# Patient Record
Sex: FEMALE | Race: WHITE | NOT HISPANIC OR LATINO | Employment: FULL TIME | URBAN - METROPOLITAN AREA
[De-identification: names, ages, dates, MRNs, and addresses within clinical notes are randomized per-mention and may not be internally consistent; named-entity substitution may affect disease eponyms.]

---

## 2017-01-11 ENCOUNTER — TRANSFERRED RECORDS (OUTPATIENT)
Dept: HEALTH INFORMATION MANAGEMENT | Facility: CLINIC | Age: 37
End: 2017-01-11

## 2017-01-28 ENCOUNTER — TRANSFERRED RECORDS (OUTPATIENT)
Dept: HEALTH INFORMATION MANAGEMENT | Facility: CLINIC | Age: 37
End: 2017-01-28

## 2017-03-27 DIAGNOSIS — G47.9 DISTURBANCE IN SLEEP BEHAVIOR: ICD-10-CM

## 2017-03-27 NOTE — TELEPHONE ENCOUNTER
trazodone       Last Written Prescription Date: 07/08/16  Last Fill Quantity: 90; # refills: 0  Last Office Visit with [facility], [facility] or  Health prescribing provider:  12/09/16        Last PHQ-9 score on record=   PHQ-9 SCORE 12/9/2016   Total Score -   Total Score 7       Lab Results   Component Value Date    AST 28 06/17/2009     Lab Results   Component Value Date    ALT 11 06/17/2009

## 2017-03-29 RX ORDER — TRAZODONE HYDROCHLORIDE 50 MG/1
TABLET, FILM COATED ORAL
Qty: 90 TABLET | Refills: 0 | Status: SHIPPED | OUTPATIENT
Start: 2017-03-29 | End: 2019-09-27

## 2017-03-29 NOTE — TELEPHONE ENCOUNTER
Prescription approved per [facility] Refill Protocol.  Geraldine Ruiz RN

## 2017-05-08 ENCOUNTER — OFFICE VISIT (OUTPATIENT)
Dept: FAMILY MEDICINE | Facility: CLINIC | Age: 37
End: 2017-05-08
Payer: COMMERCIAL

## 2017-05-08 VITALS
BODY MASS INDEX: 23.46 KG/M2 | HEART RATE: 72 BPM | DIASTOLIC BLOOD PRESSURE: 70 MMHG | TEMPERATURE: 98.1 F | WEIGHT: 127.5 LBS | HEIGHT: 62 IN | RESPIRATION RATE: 8 BRPM | SYSTOLIC BLOOD PRESSURE: 100 MMHG

## 2017-05-08 DIAGNOSIS — S61.451A ANIMAL BITE OF HAND, RIGHT, INITIAL ENCOUNTER: Primary | ICD-10-CM

## 2017-05-08 PROCEDURE — 99214 OFFICE O/P EST MOD 30 MIN: CPT | Performed by: PHYSICIAN ASSISTANT

## 2017-05-08 RX ORDER — DOXYCYCLINE 100 MG/1
100 CAPSULE ORAL 2 TIMES DAILY
Qty: 20 CAPSULE | Refills: 0 | Status: SHIPPED | OUTPATIENT
Start: 2017-05-08 | End: 2017-10-31

## 2017-05-08 ASSESSMENT — ANXIETY QUESTIONNAIRES
2. NOT BEING ABLE TO STOP OR CONTROL WORRYING: SEVERAL DAYS
6. BECOMING EASILY ANNOYED OR IRRITABLE: MORE THAN HALF THE DAYS
GAD7 TOTAL SCORE: 8
3. WORRYING TOO MUCH ABOUT DIFFERENT THINGS: SEVERAL DAYS
7. FEELING AFRAID AS IF SOMETHING AWFUL MIGHT HAPPEN: SEVERAL DAYS
1. FEELING NERVOUS, ANXIOUS, OR ON EDGE: SEVERAL DAYS
IF YOU CHECKED OFF ANY PROBLEMS ON THIS QUESTIONNAIRE, HOW DIFFICULT HAVE THESE PROBLEMS MADE IT FOR YOU TO DO YOUR WORK, TAKE CARE OF THINGS AT HOME, OR GET ALONG WITH OTHER PEOPLE: NOT DIFFICULT AT ALL
5. BEING SO RESTLESS THAT IT IS HARD TO SIT STILL: SEVERAL DAYS

## 2017-05-08 ASSESSMENT — PATIENT HEALTH QUESTIONNAIRE - PHQ9: 5. POOR APPETITE OR OVEREATING: SEVERAL DAYS

## 2017-05-08 ASSESSMENT — PAIN SCALES - GENERAL: PAINLEVEL: MODERATE PAIN (4)

## 2017-05-08 NOTE — PROGRESS NOTES
SUBJECTIVE:                                                    Barbara Yates is a 36 year old female who presents to clinic today for the following health issues:      Concern - Parrot Bite      Onset: Yesterday- 11am (28hrs)    They own 2 parrots. Moved some of the equipment in cage and got bit by her African Gibbs parrot.     Right hand- top of beak into the palmar side of hand, between thumb and index finger. Only 1 puncture site.    RIGHT handed    Pain and swelling, pain with gripping.     No drainage from site. At time of bite small bleeding.     Slightly red over dorsum of hand    No fevers or chills.    Does not fell sick.     No headaches    Description:   Was bitten my her parrot. He then bit her hand. Hand is swollen and red. Pt stated the bite is deep    Intensity: 4/10    Progression of Symptoms:  worsening from movements and hand shakes at work    Accompanying Signs & Symptoms:  No       Previous history of similar problem:   No    Precipitating factors:   Worsened by: overuse    Alleviating factors:  Improved by: No       Therapies Tried and outcome: No      Problem list and histories reviewed & adjusted, as indicated.  Additional history: as documented    Patient Active Problem List   Diagnosis     Disturbance in sleep behavior     Urinary frequency     Papanicolaou smear of cervix with low grade squamous intraepithelial lesion (LGSIL)     Other abnormal Papanicolaou smear of cervix and cervical HPV(795.09)     CARDIOVASCULAR SCREENING; LDL GOAL LESS THAN 160     Generalized anxiety disorder     Moderate major depression (H)     Anal fissure     Acquired subluxation of left patella, subsequent encounter     Past Surgical History:   Procedure Laterality Date     ARTHROSCOPY KNEE RT/LT Left 9/8/16     AS CLOSED TX NASAL BONE FRACTURE W/ STABILIZATION  3/1/16     EXAM OF VAGINA,COLPOSCOPY  2006, 2005     EYE SURGERY  2008    Lasik eye surgery       Social History   Substance Use Topics      "Smoking status: Former Smoker     Years: 15.00     Types: Cigarettes     Quit date: 7/23/2010     Smokeless tobacco: Never Used     Alcohol use Yes      Comment: rarely     Family History   Problem Relation Age of Onset     Hypertension Mother      Unknown/Adopted Father      Family History Negative Other          Current Outpatient Prescriptions   Medication Sig Dispense Refill     traZODone (DESYREL) 50 MG tablet TAKE ONE-HALF (1/2) TABLET NIGHTLY AS NEEDED FOR SLEEP 90 tablet 0     busPIRone (BUSPAR) 15 MG tablet TAKE 1 TABLET TWICE A  tablet 1     FLUoxetine (PROZAC) 40 MG capsule TAKE 2 CAPSULES DAILY 180 capsule 2     albuterol (PROAIR HFA, PROVENTIL HFA, VENTOLIN HFA) 108 (90 BASE) MCG/ACT inhaler Inhale 2 puffs into the lungs every 6 hours 2 Inhaler 0     DimenhyDRINATE (DRAMAMINE PO) Take  by mouth. As needed for dizziness       Calcium Carbonate-Vitamin D (CALCIUM + D PO) Take  by mouth.       desonide (DESOWEN) 0.05 % cream Reported on 5/8/2017       cetirizine HCl (ZYRTEC) 10 MG CHEW Take by mouth daily Reported on 5/8/2017       Ferrous Sulfate (SLOW FE PO) Reported on 5/8/2017       Prenatal Vit-Fe Sulfate-FA (PRENATAL MULTIVIT-IRON PO) Reported on 5/8/2017       Allergies   Allergen Reactions     Seasonal Allergies      BP Readings from Last 3 Encounters:   05/08/17 100/70   12/09/16 100/60   09/01/16 110/68    Wt Readings from Last 3 Encounters:   05/08/17 127 lb 8 oz (57.8 kg)   12/09/16 129 lb (58.5 kg)   09/01/16 137 lb (62.1 kg)                Labs reviewed in EPIC    Reviewed and updated as needed this visit by clinical staff       Reviewed and updated as needed this visit by Provider         ROS:  Constitutional, HEENT, cardiovascular, pulmonary, gi and gu systems are negative, except as otherwise noted.    OBJECTIVE:                                                    /70  Pulse 72  Temp 98.1  F (36.7  C) (Temporal)  Resp 8  Ht 5' 2.4\" (1.585 m)  Wt 127 lb 8 oz (57.8 kg)  LMP " 04/07/2017  BMI 23.02 kg/m2  Body mass index is 23.02 kg/(m^2).  GENERAL: healthy, alert and no distress  MS: right hand: +swelling and subtle redness between right thumb and index finger. Puncture site palmar side. Light pink over dorsum of hand between thumb and index with subtle swelling up to index MCP. Tender with palpation. ROM normal, strength 5/5 with flex, extension, pronation, supination.  5/5, sensation normal and symmetric, radial pulses normal    Diagnostic Test Results:  none      ASSESSMENT/PLAN:                                                      1. Animal bite of hand, right, initial encounter- parrot bite  Reviewed Up to date for recommendations on parrot bite:  Coverage for most common pet-related leighton bacterial zoonosis Chlamydia psittaci - with doxycycline. Doxy would also cover for P.multocida and staph.   Marked line with permanent gunn over cellulitic area. Warning s/sx for Er eval if worsening pain, swelling, lymphatic streaking discussed.   Close follow up- recheck 48hrs.   - doxycycline (VIBRAMYCIN) 100 MG capsule; Take 1 capsule (100 mg) by mouth 2 times daily  Dispense: 20 capsule; Refill: 0    Follow Up: For worsening symptoms (ie new fevers, worsening pain, etc), non-improvement as expected/discussed, questions regarding your medications or treatment plan. Discussed parameters for follow up and included in After Visit Summary given to patient.      Tamiko Santos PA-C  [facility]

## 2017-05-08 NOTE — PATIENT INSTRUCTIONS
The most common pet-related leighton bacterial zoonosis is infection due to Chlamydia psittaci (psittacosis).Doxycycline (100 mg orally twice daily) usually produces a rapid clinical response in patients with mild to moderate disease.   Doxy also covers for P.multocida .    Doxycycline : Twice daily x 10 days  Separate out calcium supplements  Take with food and water  May make you sunsensitive    Signs and symptoms to be seen urgently (ER):  New fever and feeling ill (flu like)   Pus from the wound  Red streak up the arm  Expanding redness outside line drawn  Swelling of the hand, ie harder to close fingers, etc.           Cellulitis  Cellulitis is an infection of the deep layers of skin. A break in the skin, such as a cut or scratch, can let bacteria under the skin. If the bacteria get to deep layers of the skin, it can be serious. If not treated, cellulitis can get into the bloodstream and lymph nodes. The infection can then spread throughout the body. This causes serious illness.  Cellulitis causes the affected skin to become red, swollen, warm, and sore. The reddened areas have a visible border. An open sore may leak fluid (pus). You may have a fever, chills, and pain.  Cellulitis is treated with antibiotics taken for 7 to 10 days. An open sore may be cleaned and covered with cool wet gauze. Symptoms should get better 1 to 2 days after treatment is started. Make sure to take all the antibiotics for the full number of days until they are gone. Keep taking the medicine even if your symptoms go away.  Home care  Follow these tips:    Limit the use of the part of your body with cellulitis. Movement can cause the infection to spread.    If the infection is on your leg, walk as little as possible in the first few days of the treatment. Keep your leg raised while sitting. This will help to reduce swelling.    Take all of the antibiotic medicine exactly as directed until it is gone. Do not miss any doses, especially  during the first 7 days. Don t stop taking the medicine when your symptoms get better.    Keep the affected area clean and dry.    Wash your hands with soap and warm water before and after touching your skin. Anyone else who touches your skin should also wash his or her hands. Don't share towels.  Follow-up care  Follow up with your healthcare provider. If your infection does not go away on 1 antibiotic, your healthcare provider will prescribe a different one.  When to seek medical advice  Call your healthcare provider right away if any of these occur:    Red areas that spread    Swelling or pain that gets worse    Fluid leaking from the skin (pus)    Fever higher of 100.4  F (38.0  C) or higher after 2 days on antibiotics    8931-5900 [employer]. [address] [place] [address], [place] [address]. All rights reserved. This information is not intended as a substitute for professional medical care. Always follow your healthcare professional's instructions.

## 2017-05-08 NOTE — MR AVS SNAPSHOT
After Visit Summary   5/8/2017    Barbara Yates    MRN: [id number]           Patient Information     Date Of Birth          1980        Visit Information        Provider Department      5/8/2017 3:40 PM Tamiko Santos PA-C [facility]        Today's Diagnoses     Animal bite of hand, right, initial encounter- parrot bite    -  1      Care Instructions    The most common pet-related leighton bacterial zoonosis is infection due to Chlamydia psittaci (psittacosis).Doxycycline (100 mg orally twice daily) usually produces a rapid clinical response in patients with mild to moderate disease.   Doxy also covers for P.multocida .    Doxycycline : Twice daily x 10 days  Separate out calcium supplements  Take with food and water  May make you sunsensitive    Signs and symptoms to be seen urgently (ER):  New fever and feeling ill (flu like)   Pus from the wound  Red streak up the arm  Expanding redness outside line drawn  Swelling of the hand, ie harder to close fingers, etc.           Cellulitis  Cellulitis is an infection of the deep layers of skin. A break in the skin, such as a cut or scratch, can let bacteria under the skin. If the bacteria get to deep layers of the skin, it can be serious. If not treated, cellulitis can get into the bloodstream and lymph nodes. The infection can then spread throughout the body. This causes serious illness.  Cellulitis causes the affected skin to become red, swollen, warm, and sore. The reddened areas have a visible border. An open sore may leak fluid (pus). You may have a fever, chills, and pain.  Cellulitis is treated with antibiotics taken for 7 to 10 days. An open sore may be cleaned and covered with cool wet gauze. Symptoms should get better 1 to 2 days after treatment is started. Make sure to take all the antibiotics for the full number of days until they are gone. Keep taking the medicine even if your symptoms go away.  Home care  Follow these  tips:    Limit the use of the part of your body with cellulitis. Movement can cause the infection to spread.    If the infection is on your leg, walk as little as possible in the first few days of the treatment. Keep your leg raised while sitting. This will help to reduce swelling.    Take all of the antibiotic medicine exactly as directed until it is gone. Do not miss any doses, especially during the first 7 days. Don t stop taking the medicine when your symptoms get better.    Keep the affected area clean and dry.    Wash your hands with soap and warm water before and after touching your skin. Anyone else who touches your skin should also wash his or her hands. Don't share towels.  Follow-up care  Follow up with your healthcare provider. If your infection does not go away on 1 antibiotic, your healthcare provider will prescribe a different one.  When to seek medical advice  Call your healthcare provider right away if any of these occur:    Red areas that spread    Swelling or pain that gets worse    Fluid leaking from the skin (pus)    Fever higher of 100.4  F (38.0  C) or higher after 2 days on antibiotics    6479-7575 [employer]. [address] [place] [address]. All rights reserved. This information is not intended as a substitute for professional medical care. Always follow your healthcare professional's instructions.              Follow-ups after your visit        Who to contact     If you have questions or need follow up information about today's clinic visit or your schedule please contact [facility] directly at [phone].  Normal or non-critical lab and imaging results will be communicated to you by MyChart, letter or phone within 4 business days after the clinic has received the results. If you do not hear from us within 7 days, please contact the clinic through MyChart or phone. If you have a critical or abnormal lab result, we will notify you by phone as soon as  "possible.  Submit refill requests through [employer] or call your pharmacy and they will forward the refill request to us. Please allow 3 business days for your refill to be completed.          Additional Information About Your Visit        [email or web address] Information     [employer] gives you secure access to your electronic health record. If you see a primary care provider, you can also send messages to your care team and make appointments. If you have questions, please call your primary care clinic.  If you do not have a primary care provider, please call [phone] and they will assist you.        Care EveryWhere ID     This is your Care EveryWhere ID. This could be used by other organizations to access your [facility] medical records  [id number]        Your Vitals Were     Pulse Temperature Respirations Height Last Period BMI (Body Mass Index)    72 98.1  F (36.7  C) (Temporal) 8 5' 2.4\" (1.585 m) 04/07/2017 23.02 kg/m2       Blood Pressure from Last 3 Encounters:   05/08/17 100/70   12/09/16 100/60   09/01/16 110/68    Weight from Last 3 Encounters:   05/08/17 127 lb 8 oz (57.8 kg)   12/09/16 129 lb (58.5 kg)   09/01/16 137 lb (62.1 kg)              Today, you had the following     No orders found for display       Primary Care Provider Office Phone # Fax #    Nicole MANJULA Bernardo [facility] [phone]       [facility] [address] [facility] [address]        Thank you!     Thank you for choosing [facility]  for your care. Our goal is always to provide you with excellent care. Hearing back from our patients is one way we can continue to improve our services. Please take a few minutes to complete the written survey that you may receive in the mail after your visit with us. Thank you!             Your Updated Medication List - Protect others around you: Learn how to safely use, store and throw away your medicines at www.disposemymeds.org.          This list is accurate as of: 5/8/17  4:12 " PM.  Always use your most recent med list.                   Brand Name Dispense Instructions for use    albuterol 108 (90 BASE) MCG/ACT Inhaler    PROAIR HFA/PROVENTIL HFA/VENTOLIN HFA    2 Inhaler    Inhale 2 puffs into the lungs every 6 hours       busPIRone 15 MG tablet    BUSPAR    180 tablet    TAKE 1 TABLET TWICE A DAY       CALCIUM + D PO      Take  by mouth.       desonide 0.05 % cream    DESOWEN     Reported on 5/8/2017       DRAMAMINE PO      Take  by mouth. As needed for dizziness       FLUoxetine 40 MG capsule    PROzac    180 capsule    TAKE 2 CAPSULES DAILY       PRENATAL MULTIVIT-IRON PO      Reported on 5/8/2017       SLOW FE PO      Reported on 5/8/2017       traZODone 50 MG tablet    DESYREL    90 tablet    TAKE ONE-HALF (1/2) TABLET NIGHTLY AS NEEDED FOR SLEEP       zyrTEC 10 MG Chew   Generic drug:  cetirizine      Take by mouth daily Reported on 5/8/2017

## 2017-05-08 NOTE — NURSING NOTE
"Chief Complaint   Patient presents with     animal bite     x Yesterday. Swollen, painful. Deep bite     Panel Management     PHQ-9/NELL       Initial /70  Pulse 72  Temp 98.1  F (36.7  C) (Temporal)  Resp 8  Ht 5' 2.4\" (1.585 m)  Wt 127 lb 8 oz (57.8 kg)  LMP 04/07/2017  BMI 23.02 kg/m2 Estimated body mass index is 23.02 kg/(m^2) as calculated from the following:    Height as of this encounter: 5' 2.4\" (1.585 m).    Weight as of this encounter: 127 lb 8 oz (57.8 kg).  Medication Reconciliation: complete     Jacy Driscoll CMA  "

## 2017-05-09 ENCOUNTER — TRANSFERRED RECORDS (OUTPATIENT)
Dept: HEALTH INFORMATION MANAGEMENT | Facility: CLINIC | Age: 37
End: 2017-05-09

## 2017-05-09 ASSESSMENT — PATIENT HEALTH QUESTIONNAIRE - PHQ9: SUM OF ALL RESPONSES TO PHQ QUESTIONS 1-9: 6

## 2017-05-09 ASSESSMENT — ANXIETY QUESTIONNAIRES: GAD7 TOTAL SCORE: 8

## 2017-05-21 ENCOUNTER — TRANSFERRED RECORDS (OUTPATIENT)
Dept: HEALTH INFORMATION MANAGEMENT | Facility: CLINIC | Age: 37
End: 2017-05-21

## 2017-07-02 DIAGNOSIS — F41.1 GENERALIZED ANXIETY DISORDER: ICD-10-CM

## 2017-07-03 NOTE — TELEPHONE ENCOUNTER
busPIRone (BUSPAR) 15 MG tablet       Last Written Prescription Date: 1/3/17  Last Fill Quantity: 180; # refills: 1  Last Office Visit with [facility], [facility] or [facility] prescribing provider:  5/8/17        Last PHQ-9 score on record=   PHQ-9 SCORE 5/8/2017   Total Score -   Total Score 6       Lab Results   Component Value Date    AST 28 06/17/2009     Lab Results   Component Value Date    ALT 11 06/17/2009

## 2017-07-05 RX ORDER — BUSPIRONE HYDROCHLORIDE 15 MG/1
TABLET ORAL
Qty: 180 TABLET | Refills: 1 | Status: SHIPPED | OUTPATIENT
Start: 2017-07-05 | End: 2019-09-27

## 2017-07-05 NOTE — TELEPHONE ENCOUNTER
Prescription approved per [facility] Refill Protocol.  Abdiel Taylor, RN, BSN

## 2017-07-25 LAB — PHQ9 SCORE: 12

## 2017-08-24 ENCOUNTER — TRANSFERRED RECORDS (OUTPATIENT)
Dept: HEALTH INFORMATION MANAGEMENT | Facility: CLINIC | Age: 37
End: 2017-08-24

## 2017-10-02 ENCOUNTER — TRANSFERRED RECORDS (OUTPATIENT)
Dept: HEALTH INFORMATION MANAGEMENT | Facility: CLINIC | Age: 37
End: 2017-10-02

## 2017-10-05 ENCOUNTER — TRANSFERRED RECORDS (OUTPATIENT)
Dept: HEALTH INFORMATION MANAGEMENT | Facility: CLINIC | Age: 37
End: 2017-10-05

## 2017-10-26 NOTE — PROGRESS NOTES
SUBJECTIVE:   CC: Barbara Yates is an 37 year old woman who presents for preventive health visit.     Physical   Annual:     Getting at least 3 servings of Calcium per day::  Yes    Bi-annual eye exam::  Yes    Dental care twice a year::  Yes    Sleep apnea or symptoms of sleep apnea::  None    Diet::  Regular (no restrictions)    Frequency of exercise::  1 day/week    Duration of exercise::  Less than 15 minutes    Taking medications regularly::  Yes    Medication side effects::  None    Additional concerns today::  YES    Left Knee: the patient says she fell after her surgery and is seeing orthopedist for this issue. She was wearing a knee brace when she fell so it wasn't a huge problem.     Mood: the patient reports good control of her depression at this time. She reports no problems with her medications.     Skin: The pt says a dog recently bit her but the issue has resolved.     Respiratory: the pt is using Albuterol as needed for breathing problems. She is requesting a refill for this inhaler.    : the pt is not using birth control at this time and notes her LMP was in August. She has irregular menstrual cycles.     Nutrition/Supplements: She notes her nutrition hasn't been great, she has a decreased appetite however. She notes she is eating but not as much. The patient takes a multivitamin and does a meal replacement shake in the mornings.     Libido/Sex: the pt explains a decreased sexual drive. She notes discomfort during intercourse, despite using lubrication.     Labs: the patient is fasting for labs today.    Today's PHQ-2 Score:   PHQ-2 ( 1999 Pfizer) 10/31/2017   Q1: Little interest or pleasure in doing things 1   Q2: Feeling down, depressed or hopeless 0   PHQ-2 Score 1   Q1: Little interest or pleasure in doing things Several days   Q2: Feeling down, depressed or hopeless Not at all   PHQ-2 Score 1     Abuse: Current or Past(Physical, Sexual or Emotional)- No  Do you feel safe in your  environment - Yes    Social History   Substance Use Topics     Smoking status: Former Smoker     Years: 15.00     Types: Cigarettes     Quit date: 7/23/2010     Smokeless tobacco: Never Used     Alcohol use Yes      Comment: rarely     The patient does not drink >3 drinks per day nor >7 drinks per week.    Reviewed orders with patient.  Reviewed health maintenance and updated orders accordingly - Yes  Labs reviewed in EPIC    Mammogram not appropriate for this patient based on age.    Pertinent mammograms are reviewed under the imaging tab.    History of abnormal Pap smear: YES - other categories - see link Cervical Cytology Screening Guidelines  Abnormal in 2005 (LSIL on 05/06/2005 and ASCUS on 11/28/2005, has since resolved  Health maintenance: Pap Q5 years with co-negative HPV testing      Reviewed and updated as needed this visit by clinical staff  Tobacco  Allergies  Meds  Problems  Med Hx  Surg Hx  Fam Hx  Soc Hx        Reviewed and updated as needed this visit by Provider  Allergies  Meds  Problems        Past Medical History:   Diagnosis Date     Anxiety      ASCUS with positive high risk HPV 11/28/05, 6/14/06    + high risk HPV (53)     Attention deficit disorder with hyperactivity(314.01)      Depression      H/O colposcopy with cervical biopsy 6/7/05    ECC- within normal limits. no dysplasia     H/O colposcopy with cervical biopsy 7/13/06    biopsies and ECC- within normal limits. no dysplasia noted.     Infectious mononucleosis 2004     LSIL (low grade squamous intraepithelial lesion) on Pap smear 5/6/05    LDSIL      Past Surgical History:   Procedure Laterality Date     ARTHROSCOPY KNEE RT/LT Left 9/8/16     AS CLOSED TX NASAL BONE FRACTURE W/ STABILIZATION  3/1/16     EXAM OF VAGINA,COLPOSCOPY  2006, 2005     EYE SURGERY  2008    Lasik eye surgery     Review of Systems  C: NEGATIVE for fever, chills, change in weight (See HPI above)  I: NEGATIVE for worrisome rashes, moles or lesions  E:  "NEGATIVE for vision changes or irritation  ENT: NEGATIVE for ear, mouth and throat problems  R: NEGATIVE for significant cough or SOB  B: NEGATIVE for masses, tenderness or discharge  CV: NEGATIVE for chest pain, palpitations or peripheral edema  GI: NEGATIVE for nausea, abdominal pain, heartburn, or change in bowel habits  : NEGATIVE for unusual urinary or vaginal symptoms. Periods are regular.  M: NEGATIVE for significant arthralgias or myalgia  N: NEGATIVE for weakness, dizziness or paresthesias  P: NEGATIVE for changes in mood or affect     This document serves as a record of the services and decisions personally performed and made by Nicole Soto DNP. It was created on her behalf by Hong Ball, a trained medical scribe. The creation of this document is based on the provider's statements to the medical scribe.  Hong Ball 9:02 AM October 31, 2017    OBJECTIVE:   BP 96/60  Pulse 84  Temp 98.8  F (37.1  C) (Temporal)  Resp 20  Ht 5' 2.8\" (1.595 m)  Wt 120 lb (54.4 kg)  LMP 08/03/2017  BMI 21.4 kg/m2     Physical Exam  GENERAL: healthy, alert and no distress  EYES: Eyes grossly normal to inspection, PERRL and conjunctivae and sclerae normal  HENT: ear canals and TM's normal, nose and mouth without ulcers or lesions  NECK: no adenopathy, no asymmetry, masses, or scars and thyroid normal to palpation  RESP: lungs clear to auscultation - no rales, rhonchi or wheezes  BREAST: normal without masses, tenderness or nipple discharge and no palpable axillary masses or adenopathy  CV: regular rate and rhythm, normal S1 S2, no S3 or S4, no murmur, click or rub, no peripheral edema and peripheral pulses strong  ABDOMEN: soft, nontender, no hepatosplenomegaly, no masses and bowel sounds normal   (female): normal female external genitalia, normal urethral meatus, vaginal mucosa pink, moist, well rugated, and normal cervix/adnexa/uterus without masses or discharge  MS: no gross musculoskeletal defects noted, " no edema  SKIN: no suspicious lesions or rashes to visible skin   NEURO: Normal strength and tone, mentation intact and speech normal  PSYCH: mentation appears normal, affect normal/bright    ASSESSMENT/PLAN:       ICD-10-CM    1. Encounter for routine adult health examination with abnormal findings Z00.01 Lipid panel reflex to direct LDL Fasting     Glucose     Vitamin D Deficiency   2. Cough R05 albuterol (PROAIR HFA/PROVENTIL HFA/VENTOLIN HFA) 108 (90 BASE) MCG/ACT Inhaler   3. Iron deficiency anemia secondary to inadequate dietary iron intake D50.8 CBC with platelets and differential     Ferritin     Folate   4. Need for prophylactic vaccination and inoculation against influenza Z23 FLU VAC, SPLIT VIRUS IM > 3 YO (QUADRIVALENT) [25270]     Vaccine Administration, Initial [61812]   5. Need for Tdap vaccination Z23 TDAP VACCINE (ADACEL)     EA ADD'L VACCINE   6. Moderate major depression (H) F32.1    7. Generalized anxiety disorder F41.1      Cough: Albuterol 1-8 mcg/ACT inhaler refilled.     Skin: the pt is offered doxycycline for her skin issue if it is still a problem. She defers at this time.     Lifestyle: advised the patient to continue exercising and try to eat more (not miss meals) despite her decreased appetite. Discussed possible nutritional etiologies of decreased sex drive. Advised switching sexual positions to try to alleviate discomfort.  Informed the pt that even with using condoms, there is a chance of getting pregnant. Pt defers birth control at this time.     Discussed her libido, meds contributing, relationship issues with her  and his possible sex addiction. Continue counseling for this as needed.     Labs: Order placed for labs that the patient will complete today.    Depression and anxiety with counseling and outside psychiatry.     COUNSELING:  Reviewed preventive health counseling, as reflected in patient instructions       Regular exercise       Healthy diet/nutrition       Vision  "screening       Hearing screening       Immunizations    Vaccinated for: Influenza, TDaP      reports that she quit smoking about 7 years ago. Her smoking use included Cigarettes. She quit after 15.00 years of use. She has never used smokeless tobacco.  Estimated body mass index is 21.4 kg/(m^2) as calculated from the following:    Height as of this encounter: 5' 2.8\" (1.595 m).    Weight as of this encounter: 120 lb (54.4 kg).     Counseling Resources:  ATP IV Guidelines  Pooled Cohorts Equation Calculator  Breast Cancer Risk Calculator  FRAX Risk Assessment  ICSI Preventive Guidelines  Dietary Guidelines for Americans, 2010  USDA's MyPlate  ASA Prophylaxis  Lung CA Screening    The information in this document, created by the medical scribe for me, accurately reflects the services I personally performed and the decisions made by me. I have reviewed and approved this document for accuracy prior to leaving the patient care area.  October 31, 2017 9:25 AM    MANJULA Guerrero [facility] LAURIE  "

## 2017-10-31 ENCOUNTER — OFFICE VISIT (OUTPATIENT)
Dept: FAMILY MEDICINE | Facility: CLINIC | Age: 37
End: 2017-10-31
Payer: COMMERCIAL

## 2017-10-31 VITALS
HEIGHT: 63 IN | WEIGHT: 120 LBS | DIASTOLIC BLOOD PRESSURE: 60 MMHG | TEMPERATURE: 98.8 F | HEART RATE: 84 BPM | RESPIRATION RATE: 20 BRPM | BODY MASS INDEX: 21.26 KG/M2 | SYSTOLIC BLOOD PRESSURE: 96 MMHG

## 2017-10-31 DIAGNOSIS — F41.1 GENERALIZED ANXIETY DISORDER: ICD-10-CM

## 2017-10-31 DIAGNOSIS — R05.9 COUGH: ICD-10-CM

## 2017-10-31 DIAGNOSIS — D50.8 IRON DEFICIENCY ANEMIA SECONDARY TO INADEQUATE DIETARY IRON INTAKE: ICD-10-CM

## 2017-10-31 DIAGNOSIS — Z00.01 ENCOUNTER FOR ROUTINE ADULT HEALTH EXAMINATION WITH ABNORMAL FINDINGS: Primary | ICD-10-CM

## 2017-10-31 DIAGNOSIS — Z23 NEED FOR TDAP VACCINATION: ICD-10-CM

## 2017-10-31 DIAGNOSIS — Z23 NEED FOR PROPHYLACTIC VACCINATION AND INOCULATION AGAINST INFLUENZA: ICD-10-CM

## 2017-10-31 DIAGNOSIS — F32.1 MODERATE MAJOR DEPRESSION (H): ICD-10-CM

## 2017-10-31 LAB
BASOPHILS # BLD AUTO: 0 10E9/L (ref 0–0.2)
BASOPHILS NFR BLD AUTO: 0.3 %
CHOLEST SERPL-MCNC: 170 MG/DL
DEPRECATED CALCIDIOL+CALCIFEROL SERPL-MC: 25 UG/L (ref 20–75)
DIFFERENTIAL METHOD BLD: NORMAL
EOSINOPHIL # BLD AUTO: 0.2 10E9/L (ref 0–0.7)
EOSINOPHIL NFR BLD AUTO: 2.9 %
ERYTHROCYTE [DISTWIDTH] IN BLOOD BY AUTOMATED COUNT: 13.3 % (ref 10–15)
FERRITIN SERPL-MCNC: 41 NG/ML (ref 12–150)
FOLATE SERPL-MCNC: 31 NG/ML
GLUCOSE SERPL-MCNC: 89 MG/DL (ref 70–99)
HCT VFR BLD AUTO: 36 % (ref 35–47)
HDLC SERPL-MCNC: 56 MG/DL
HGB BLD-MCNC: 11.7 G/DL (ref 11.7–15.7)
LDLC SERPL CALC-MCNC: 90 MG/DL
LYMPHOCYTES # BLD AUTO: 1.7 10E9/L (ref 0.8–5.3)
LYMPHOCYTES NFR BLD AUTO: 21.5 %
MCH RBC QN AUTO: 29.3 PG (ref 26.5–33)
MCHC RBC AUTO-ENTMCNC: 32.5 G/DL (ref 31.5–36.5)
MCV RBC AUTO: 90 FL (ref 78–100)
MONOCYTES # BLD AUTO: 0.6 10E9/L (ref 0–1.3)
MONOCYTES NFR BLD AUTO: 7.7 %
NEUTROPHILS # BLD AUTO: 5.2 10E9/L (ref 1.6–8.3)
NEUTROPHILS NFR BLD AUTO: 67.6 %
NONHDLC SERPL-MCNC: 114 MG/DL
PLATELET # BLD AUTO: 236 10E9/L (ref 150–450)
RBC # BLD AUTO: 3.99 10E12/L (ref 3.8–5.2)
TRIGL SERPL-MCNC: 118 MG/DL
WBC # BLD AUTO: 7.7 10E9/L (ref 4–11)

## 2017-10-31 PROCEDURE — 80061 LIPID PANEL: CPT | Performed by: NURSE PRACTITIONER

## 2017-10-31 PROCEDURE — 90686 IIV4 VACC NO PRSV 0.5 ML IM: CPT | Performed by: NURSE PRACTITIONER

## 2017-10-31 PROCEDURE — 99395 PREV VISIT EST AGE 18-39: CPT | Mod: 25 | Performed by: NURSE PRACTITIONER

## 2017-10-31 PROCEDURE — 82728 ASSAY OF FERRITIN: CPT | Performed by: NURSE PRACTITIONER

## 2017-10-31 PROCEDURE — 90471 IMMUNIZATION ADMIN: CPT | Performed by: NURSE PRACTITIONER

## 2017-10-31 PROCEDURE — 82947 ASSAY GLUCOSE BLOOD QUANT: CPT | Performed by: NURSE PRACTITIONER

## 2017-10-31 PROCEDURE — 82746 ASSAY OF FOLIC ACID SERUM: CPT | Performed by: NURSE PRACTITIONER

## 2017-10-31 PROCEDURE — 90715 TDAP VACCINE 7 YRS/> IM: CPT | Performed by: NURSE PRACTITIONER

## 2017-10-31 PROCEDURE — 85025 COMPLETE CBC W/AUTO DIFF WBC: CPT | Performed by: NURSE PRACTITIONER

## 2017-10-31 PROCEDURE — 36415 COLL VENOUS BLD VENIPUNCTURE: CPT | Performed by: NURSE PRACTITIONER

## 2017-10-31 PROCEDURE — 90472 IMMUNIZATION ADMIN EACH ADD: CPT | Performed by: NURSE PRACTITIONER

## 2017-10-31 PROCEDURE — 82306 VITAMIN D 25 HYDROXY: CPT | Performed by: NURSE PRACTITIONER

## 2017-10-31 RX ORDER — VENLAFAXINE HYDROCHLORIDE 75 MG/1
75 CAPSULE, EXTENDED RELEASE ORAL 3 TIMES DAILY
COMMUNITY
Start: 2017-08-24 | End: 2019-09-27

## 2017-10-31 RX ORDER — ALBUTEROL SULFATE 90 UG/1
2 AEROSOL, METERED RESPIRATORY (INHALATION) EVERY 6 HOURS
Qty: 2 INHALER | Refills: 0 | Status: SHIPPED | OUTPATIENT
Start: 2017-10-31 | End: 2018-06-26

## 2017-10-31 ASSESSMENT — PATIENT HEALTH QUESTIONNAIRE - PHQ9
10. IF YOU CHECKED OFF ANY PROBLEMS, HOW DIFFICULT HAVE THESE PROBLEMS MADE IT FOR YOU TO DO YOUR WORK, TAKE CARE OF THINGS AT HOME, OR GET ALONG WITH OTHER PEOPLE: SOMEWHAT DIFFICULT
SUM OF ALL RESPONSES TO PHQ QUESTIONS 1-9: 5
SUM OF ALL RESPONSES TO PHQ QUESTIONS 1-9: 5

## 2017-10-31 ASSESSMENT — PAIN SCALES - GENERAL: PAINLEVEL: NO PAIN (0)

## 2017-10-31 NOTE — NURSING NOTE
"Chief Complaint   Patient presents with     Physical     Panel Management     flu, DAP, Phq9, NELL       Initial BP 96/60  Pulse 84  Temp 98.8  F (37.1  C) (Temporal)  Resp 20  Ht 5' 2.8\" (1.595 m)  Wt 120 lb (54.4 kg)  LMP 08/03/2017  BMI 21.4 kg/m2 Estimated body mass index is 21.4 kg/(m^2) as calculated from the following:    Height as of this encounter: 5' 2.8\" (1.595 m).    Weight as of this encounter: 120 lb (54.4 kg).  Medication Reconciliation: complete     Adam Linton MA    "

## 2017-10-31 NOTE — MR AVS SNAPSHOT
After Visit Summary   10/31/2017    Barbara Yates    MRN: [id number]           Patient Information     Date Of Birth          1980        Visit Information        Provider Department      10/31/2017 8:20 AM Nicole Soto APRN CNP [facility] [place]        Today's Diagnoses     Encounter for routine adult health examination with abnormal findings    -  1    Cough        Iron deficiency anemia secondary to inadequate dietary iron intake        Need for prophylactic vaccination and inoculation against influenza        Need for Tdap vaccination        Moderate major depression (H)        Generalized anxiety disorder           Follow-ups after your visit        Who to contact     If you have questions or need follow up information about today's clinic visit or your schedule please contact [facility] directly at [phone].  Normal or non-critical lab and imaging results will be communicated to you by [employer], letter or phone within 4 business days after the clinic has received the results. If you do not hear from us within 7 days, please contact the clinic through [employer] or phone. If you have a critical or abnormal lab result, we will notify you by phone as soon as possible.  Submit refill requests through [employer] or call your pharmacy and they will forward the refill request to us. Please allow 3 business days for your refill to be completed.          Additional Information About Your Visit        MyChart Information     [employer] gives you secure access to your electronic health record. If you see a primary care provider, you can also send messages to your care team and make appointments. If you have questions, please call your primary care clinic.  If you do not have a primary care provider, please call [phone] and they will assist you.        Care EveryWhere ID     This is your Care EveryWhere ID. This could be used by other organizations to access your [facility]  "medical records  [id number]        Your Vitals Were     Pulse Temperature Respirations Height Last Period BMI (Body Mass Index)    84 98.8  F (37.1  C) (Temporal) 20 5' 2.8\" (1.595 m) 08/03/2017 21.4 kg/m2       Blood Pressure from Last 3 Encounters:   10/31/17 96/60   05/08/17 100/70   12/09/16 100/60    Weight from Last 3 Encounters:   10/31/17 120 lb (54.4 kg)   05/08/17 127 lb 8 oz (57.8 kg)   12/09/16 129 lb (58.5 kg)              We Performed the Following     CBC with platelets and differential     DEPRESSION ACTION PLAN (DAP)     EA ADD'L VACCINE     Ferritin     FLU VAC, SPLIT VIRUS IM > 3 YO (QUADRIVALENT) [35849]     Folate     Glucose     Lipid panel reflex to direct LDL Fasting     TDAP VACCINE (ADACEL)     Vaccine Administration, Initial [26276]     Vitamin D Deficiency          Today's Medication Changes          These changes are accurate as of: 10/31/17  4:21 PM.  If you have any questions, ask your nurse or doctor.               Stop taking these medicines if you haven't already. Please contact your care team if you have questions.     FLUoxetine 40 MG capsule   Commonly known as:  PROzac   Stopped by:  Nicole Soto APRN CNP                Where to get your medicines      These medications were sent to [facility] [address] [address]     Phone:  [phone]     albuterol 108 (90 BASE) MCG/ACT Inhaler                Primary Care Provider Office Phone # Fax #    MANJULA Payne [phone] [address] [facility] [address]        Equal Access to Services     [facility] AH: Hadii alex valera hadasho Soomaali, waaxda luqadaha, qaybta kaalmada adeegyada, jaquan navarro. So [facility] [phone].    ATENCIÓN: Si habla español, tiene a coles disposición servicios gratuitos de asistencia lingüística. Llame al [phone].    We comply with applicable federal civil rights laws " and [place] laws. We do not discriminate on the basis of race, color, national origin, age, disability, sex, sexual orientation, or gender identity.            Thank you!     Thank you for choosing [facility]  for your care. Our goal is always to provide you with excellent care. Hearing back from our patients is one way we can continue to improve our services. Please take a few minutes to complete the written survey that you may receive in the mail after your visit with us. Thank you!             Your Updated Medication List - Protect others around you: Learn how to safely use, store and throw away your medicines at www.disposemymeds.org.          This list is accurate as of: 10/31/17  4:21 PM.  Always use your most recent med list.                   Brand Name Dispense Instructions for use Diagnosis    albuterol 108 (90 BASE) MCG/ACT Inhaler    PROAIR HFA/PROVENTIL HFA/VENTOLIN HFA    2 Inhaler    Inhale 2 puffs into the lungs every 6 hours    Cough       busPIRone 15 MG tablet    BUSPAR    180 tablet    TAKE 1 TABLET TWICE A DAY    Generalized anxiety disorder       CALCIUM + D PO      Take  by mouth.        desonide 0.05 % cream    DESOWEN     Reported on 5/8/2017        DRAMAMINE PO      Take  by mouth. As needed for dizziness        PRENATAL MULTIVIT-IRON PO      Reported on 5/8/2017        SLOW FE PO      Reported on 5/8/2017        traZODone 50 MG tablet    DESYREL    90 tablet    TAKE ONE-HALF (1/2) TABLET NIGHTLY AS NEEDED FOR SLEEP    Disturbance in sleep behavior       venlafaxine 75 MG 24 hr capsule    EFFEXOR-XR     Take 75 mg by mouth 3 times daily        zyrTEC 10 MG Chew   Generic drug:  cetirizine      Take by mouth daily Reported on 5/8/2017

## 2017-10-31 NOTE — PROGRESS NOTES

## 2017-10-31 NOTE — LETTER
My Depression Action Plan  Name: Barbara Yates   Date of Birth 1980  Date: 10/26/2017    My doctor: Nicole Soto   My clinic: [facility]  [id number] [place] [address], [address]  [place] [id number] [phone]          GREEN    ZONE   Good Control    What it looks like:     Things are going generally well. You have normal up s and down s. You may even feel depressed from time to time, but bad moods usually last less than a day.   What you need to do:  1. Continue to care for yourself (see self care plan)  2. Check your depression survival kit and update it as needed  3. Follow your physician s recommendations including any medication.  4. Do not stop taking medication unless you consult with your physician first.           YELLOW         ZONE Getting Worse    What it looks like:     Depression is starting to interfere with your life.     It may be hard to get out of bed; you may be starting to isolate yourself from others.    Symptoms of depression are starting to last most all day and this has happened for several days.     You may have suicidal thoughts but they are not constant.   What you need to do:     1. Call your care team, your response to treatment will improve if you keep your care team informed of your progress. Yellow periods are signs an adjustment may need to be made.     2. Continue your self-care, even if you have to fake it!    3. Talk to someone in your support network    4. Open up your depression survival kit           RED    ZONE Medical Alert - Get Help    What it looks like:     Depression is seriously interfering with your life.     You may experience these or other symptoms: You can t get out of bed most days, can t work or engage in other necessary activities, you have trouble taking care of basic hygiene, or basic responsibilities, thoughts of suicide or death that will not go away, self-injurious behavior.     What you need to do:  1. Call your care team  and request a same-day appointment. If they are not available (weekends or after hours) call your local crisis line, emergency room or 911.      Electronically signed by: Leni Hill, October 26, 2017    Depression Self Care Plan / Survival Kit    Self-Care for Depression  Here s the deal. Your body and mind are really not as separate as most people think.  What you do and think affects how you feel and how you feel influences what you do and think. This means if you do things that people who feel good do, it will help you feel better.  Sometimes this is all it takes.  There is also a place for medication and therapy depending on how severe your depression is, so be sure to consult with your medical provider and/ or Behavioral Health Consultant if your symptoms are worsening or not improving.     In order to better manage my stress, I will:    Exercise  Get some form of exercise, every day. This will help reduce pain and release endorphins, the  feel good  chemicals in your brain. This is almost as good as taking antidepressants!  This is not the same as joining a gym and then never going! (they count on that by the way ) It can be as simple as just going for a walk or doing some gardening, anything that will get you moving.      Hygiene   Maintain good hygiene (Get out of bed in the morning, Make your bed, Brush your teeth, Take a shower, and Get dressed like you were going to work, even if you are unemployed).  If your clothes don't fit try to get ones that do.    Diet  I will strive to eat foods that are good for me, drink plenty of water, and avoid excessive sugar, caffeine, alcohol, and other mood-altering substances.  Some foods that are helpful in depression are: complex carbohydrates, B vitamins, flaxseed, fish or fish oil, fresh fruits and vegetables.    Psychotherapy  I agree to participate in Individual Therapy (if recommended).    Medication  If prescribed medications, I agree to take them.  Missing  doses can result in serious side effects.  I understand that drinking alcohol, or other illicit drug use, may cause potential side effects.  I will not stop my medication abruptly without first discussing it with my provider.    Staying Connected With Others  I will stay in touch with my friends, family members, and my primary care provider/team.    Use your imagination  Be creative.  We all have a creative side; it doesn t matter if it s oil painting, sand castles, or mud pies! This will also kick up the endorphins.    Witness Beauty  (AKA stop and smell the roses) Take a look outside, even in mid-winter. Notice colors, textures. Watch the squirrels and birds.     Service to others  Be of service to others.  There is always someone else in need.  By helping others we can  get out of ourselves  and remember the really important things.  This also provides opportunities for practicing all the other parts of the program.    Humor  Laugh and be silly!  Adjust your TV habits for less news and crime-drama and more comedy.    Control your stress  Try breathing deep, massage therapy, biofeedback, and meditation. Find time to relax each day.     My support system    Clinic Contact:  Phone number:    Contact 1:  Phone number:    Contact 2:  Phone number:    Scientologist/:  Phone number:    Therapist:  Phone number:    Local crisis center:    Phone number:    Other community support:  Phone number:

## 2017-11-01 ASSESSMENT — PATIENT HEALTH QUESTIONNAIRE - PHQ9: SUM OF ALL RESPONSES TO PHQ QUESTIONS 1-9: 5

## 2018-04-26 ENCOUNTER — TRANSFERRED RECORDS (OUTPATIENT)
Dept: HEALTH INFORMATION MANAGEMENT | Facility: CLINIC | Age: 38
End: 2018-04-26

## 2018-04-26 LAB — PHQ9 SCORE: 6

## 2018-05-18 ENCOUNTER — OFFICE VISIT (OUTPATIENT)
Dept: FAMILY MEDICINE | Facility: CLINIC | Age: 38
End: 2018-05-18
Payer: COMMERCIAL

## 2018-05-18 VITALS
HEIGHT: 63 IN | WEIGHT: 132 LBS | TEMPERATURE: 98.9 F | RESPIRATION RATE: 16 BRPM | HEART RATE: 90 BPM | BODY MASS INDEX: 23.39 KG/M2 | SYSTOLIC BLOOD PRESSURE: 100 MMHG | DIASTOLIC BLOOD PRESSURE: 62 MMHG

## 2018-05-18 DIAGNOSIS — H61.91 LESION OF EXTERNAL EAR CANAL, RIGHT: Primary | ICD-10-CM

## 2018-05-18 PROCEDURE — 99213 OFFICE O/P EST LOW 20 MIN: CPT | Performed by: FAMILY MEDICINE

## 2018-05-18 ASSESSMENT — ANXIETY QUESTIONNAIRES
7. FEELING AFRAID AS IF SOMETHING AWFUL MIGHT HAPPEN: SEVERAL DAYS
4. TROUBLE RELAXING: SEVERAL DAYS
1. FEELING NERVOUS, ANXIOUS, OR ON EDGE: SEVERAL DAYS
5. BEING SO RESTLESS THAT IT IS HARD TO SIT STILL: SEVERAL DAYS
GAD7 TOTAL SCORE: 8
GAD7 TOTAL SCORE: 8
7. FEELING AFRAID AS IF SOMETHING AWFUL MIGHT HAPPEN: SEVERAL DAYS
GAD7 TOTAL SCORE: 8
2. NOT BEING ABLE TO STOP OR CONTROL WORRYING: SEVERAL DAYS
3. WORRYING TOO MUCH ABOUT DIFFERENT THINGS: SEVERAL DAYS
6. BECOMING EASILY ANNOYED OR IRRITABLE: MORE THAN HALF THE DAYS

## 2018-05-18 ASSESSMENT — PAIN SCALES - GENERAL: PAINLEVEL: NO PAIN (0)

## 2018-05-18 ASSESSMENT — PATIENT HEALTH QUESTIONNAIRE - PHQ9
10. IF YOU CHECKED OFF ANY PROBLEMS, HOW DIFFICULT HAVE THESE PROBLEMS MADE IT FOR YOU TO DO YOUR WORK, TAKE CARE OF THINGS AT HOME, OR GET ALONG WITH OTHER PEOPLE: SOMEWHAT DIFFICULT
SUM OF ALL RESPONSES TO PHQ QUESTIONS 1-9: 9
SUM OF ALL RESPONSES TO PHQ QUESTIONS 1-9: 9

## 2018-05-18 NOTE — PROGRESS NOTES
SUBJECTIVE:   Barbara Yatse is a 37 year old female who presents to clinic today for the following health issues:    Ear Problem     History of Present Illness     Diet:  Regular (no restrictions)  Frequency of exercise:  2-3 days/week  Duration of exercise:  Less than 15 minutes  Taking medications regularly:  Yes  Medication side effects:  None  Additional concerns today:  No    Acute Illness   Acute illness concerns: right ear pain   Onset: today     Fever: no    Chills/Sweats: no    Headache (location?): no    Sinus Pressure:no    Conjunctivitis:  no    Ear Pain: YES- dulled pain, used q-tip     Rhinorrhea: no    Congestion: no    Sore Throat: no     Cough: no    Wheeze: no    Decreased Appetite: no    Nausea: no    Vomiting: no    Diarrhea:  no    Dysuria/Freq.: no    Fatigue/Achiness: no    Sick/Strep Exposure: no     Therapies Tried and outcome: q-tip     She has been having some ear pain. When she showers she uses a Q-tip to clean her ears in the shower to dry them out and prevent water from getting in them. The right ear she noticed this morning there was a bit of blood on the Q-tip and it was very painful. She hasn't had an ear infection since she was 14 or 16 years old. The ear isn't particularly painful to the touch either, and pulling on it seems to give her some relief. She feels that the pain is in the bottom part of the ear canal. She wants to be sure she doesn't have an ear infection before the weekend.       Problem list and histories reviewed & adjusted, as indicated.  Additional history: as documented    BP Readings from Last 3 Encounters:   05/18/18 100/62   10/31/17 96/60   05/08/17 100/70    Wt Readings from Last 3 Encounters:   05/18/18 59.9 kg (132 lb)   10/31/17 54.4 kg (120 lb)   05/08/17 57.8 kg (127 lb 8 oz)         ROS:  CONSTITUTIONAL: NEGATIVE for fever, chills, change in weight  ENT/MOUTH: POSITIVE for ear pain    This document serves as a record of the services and decisions  "personally performed and made by Holly Pathak MD. It was created on her behalf by Татьяна Mendoza, a trained medical scribe. The creation of this document is based the provider's statements to the medical scribe.  Татьяна Kelly, May 18, 2018 3:18 PM     OBJECTIVE:     /62  Pulse 90  Temp 98.9  F (37.2  C) (Temporal)  Resp 16  Ht 1.6 m (5' 3\")  Wt 59.9 kg (132 lb)  LMP 02/12/2018  BMI 23.38 kg/m2  Body mass index is 23.38 kg/(m^2).  GENERAL: healthy, alert and no distress  EYES: Eyes grossly normal to inspection, PERRL and conjunctivae and sclerae normal  HENT: small comedo with a scab over the top of it noted on anterior side of right ear canal, otherwise TM's normal  SKIN: no suspicious lesions or rashes to visible skin  PSYCH: mentation appears normal, affect normal/bright    No results found for this or any previous visit (from the past 24 hour(s)).    ASSESSMENT/PLAN:         1. Lesion of external ear canal, right  Should avoid irritating the area with the cotton swabs again. Can try a warm compress on the ear for pain relief.  Tylenol and/or Motrin as needed.  Should return if the pain persists longer than a week.         See Patient Instructions    The information in this document, created by the medical scribe for me, accurately reflects the services I personally performed and the decisions made by me. I have reviewed and approved this document for accuracy.     HOLLY PATHAK MD, MD  [facility]  "

## 2018-05-18 NOTE — MR AVS SNAPSHOT
"              After Visit Summary   2018    Barbara Yates    MRN: [id number]           Patient Information     Date Of Birth          1980        Visit Information        Provider Department      2018 2:40 PM Eladia Kern MD [facility]        Today's Diagnoses     Lesion of external ear canal, right    -  1       Follow-ups after your visit        Who to contact     If you have questions or need follow up information about today's clinic visit or your schedule please contact [facility] directly at [phone].  Normal or non-critical lab and imaging results will be communicated to you by Building Roboticshart, letter or phone within 4 business days after the clinic has received the results. If you do not hear from us within 7 days, please contact the clinic through Building Rob[employer]hart or phone. If you have a critical or abnormal lab result, we will notify you by phone as soon as possible.  Submit refill requests through ProfStream or call your pharmacy and they will forward the refill request to us. Please allow 3 business days for your refill to be completed.          Additional Information About Your Visit        MyChart Information     ProfStream lets you send messages to your doctor, view your test results, renew your prescriptions, schedule appointments and more. To sign up, go to [email or web address] . Click on \"Log in\" on the left side of the screen, which will take you to the Welcome page. Then click on \"Sign up Now\" on the right side of the page.     You will be asked to enter the access code listed below, as well as some personal information. Please follow the directions to create your username and password.     Your access code is: [email or web address]  Expires: 2018 10:45 PM     Your access code will  in 90 days. If you need help or a new code, please call your [facility] or [phone].        Care EveryWhere ID     This is your Care EveryWhere ID. This could be used by " "other organizations to access your [place] medical records  [id number]        Your Vitals Were     Pulse Temperature Respirations Height Last Period BMI (Body Mass Index)    90 98.9  F (37.2  C) (Temporal) 16 5' 3\" (1.6 m) 02/12/2018 23.38 kg/m2       Blood Pressure from Last 3 Encounters:   05/18/18 100/62   10/31/17 96/60   05/08/17 100/70    Weight from Last 3 Encounters:   05/18/18 132 lb (59.9 kg)   10/31/17 120 lb (54.4 kg)   05/08/17 127 lb 8 oz (57.8 kg)              Today, you had the following     No orders found for display       Primary Care Provider Office Phone # Fax #    MANJULA [facility] [phone] [address] [facility] [address]        Equal Access to Services     [facility]: Hadii aad ku hadasho Soomaali, waaxda luqadaha, qaybta kaalmada adeegyada, waxay justinin hayaan jose arceo . So [facility] [phone].    ATENCIÓN: Si habla español, tiene a coles disposición servicios gratuitos de asistencia lingüística. Maria Eugenia al [phone].    We comply with applicable federal civil rights laws and [place] laws. We do not discriminate on the basis of race, color, national origin, age, disability, sex, sexual orientation, or gender identity.            Thank you!     Thank you for choosing [facility]  for your care. Our goal is always to provide you with excellent care. Hearing back from our patients is one way we can continue to improve our services. Please take a few minutes to complete the written survey that you may receive in the mail after your visit with us. Thank you!             Your Updated Medication List - Protect others around you: Learn how to safely use, store and throw away your medicines at www.disposemymeds.org.          This list is accurate as of 5/18/18 11:59 PM.  Always use your most recent med list.                   Brand Name Dispense Instructions for use Diagnosis    albuterol 108 (90 Base) MCG/ACT Inhaler    PROAIR " HFA/PROVENTIL HFA/VENTOLIN HFA    2 Inhaler    Inhale 2 puffs into the lungs every 6 hours    Cough       busPIRone 15 MG tablet    BUSPAR    180 tablet    TAKE 1 TABLET TWICE A DAY    Generalized anxiety disorder       CALCIUM + D PO      Take  by mouth.        desonide 0.05 % cream    DESOWEN     Reported on 5/8/2017        DRAMAMINE PO      Take  by mouth. As needed for dizziness        PRENATAL MULTIVIT-IRON PO      Reported on 5/8/2017        SLOW FE PO      Reported on 5/8/2017        traZODone 50 MG tablet    DESYREL    90 tablet    TAKE ONE-HALF (1/2) TABLET NIGHTLY AS NEEDED FOR SLEEP    Disturbance in sleep behavior       venlafaxine 75 MG 24 hr capsule    EFFEXOR-XR     Take 75 mg by mouth 3 times daily        zyrTEC 10 MG Chew   Generic drug:  cetirizine      Take by mouth daily Reported on 5/8/2017

## 2018-05-19 ASSESSMENT — ANXIETY QUESTIONNAIRES: GAD7 TOTAL SCORE: 8

## 2018-05-19 ASSESSMENT — PATIENT HEALTH QUESTIONNAIRE - PHQ9: SUM OF ALL RESPONSES TO PHQ QUESTIONS 1-9: 9

## 2018-06-22 ENCOUNTER — OFFICE VISIT (OUTPATIENT)
Dept: FAMILY MEDICINE | Facility: CLINIC | Age: 38
End: 2018-06-22
Payer: COMMERCIAL

## 2018-06-22 VITALS
SYSTOLIC BLOOD PRESSURE: 102 MMHG | HEIGHT: 63 IN | HEART RATE: 71 BPM | DIASTOLIC BLOOD PRESSURE: 64 MMHG | BODY MASS INDEX: 23.92 KG/M2 | OXYGEN SATURATION: 99 % | WEIGHT: 135 LBS | RESPIRATION RATE: 18 BRPM | TEMPERATURE: 98.3 F

## 2018-06-22 DIAGNOSIS — H69.91 DYSFUNCTION OF RIGHT EUSTACHIAN TUBE: ICD-10-CM

## 2018-06-22 DIAGNOSIS — H65.01 RIGHT ACUTE SEROUS OTITIS MEDIA, RECURRENCE NOT SPECIFIED: Primary | ICD-10-CM

## 2018-06-22 PROCEDURE — 99214 OFFICE O/P EST MOD 30 MIN: CPT | Performed by: PHYSICIAN ASSISTANT

## 2018-06-22 RX ORDER — FLUTICASONE PROPIONATE 50 MCG
1-2 SPRAY, SUSPENSION (ML) NASAL DAILY
Qty: 1 BOTTLE | Refills: 0 | Status: SHIPPED | OUTPATIENT
Start: 2018-06-22 | End: 2020-05-05

## 2018-06-22 RX ORDER — AMOXICILLIN 875 MG
875 TABLET ORAL 2 TIMES DAILY
Qty: 20 TABLET | Refills: 0 | Status: SHIPPED | OUTPATIENT
Start: 2018-06-22 | End: 2018-06-25 | Stop reason: ALTCHOICE

## 2018-06-22 RX ORDER — PREDNISONE 20 MG/1
20 TABLET ORAL DAILY
Qty: 5 TABLET | Refills: 0 | Status: SHIPPED | OUTPATIENT
Start: 2018-06-22 | End: 2019-04-24

## 2018-06-22 ASSESSMENT — ANXIETY QUESTIONNAIRES
1. FEELING NERVOUS, ANXIOUS, OR ON EDGE: MORE THAN HALF THE DAYS
7. FEELING AFRAID AS IF SOMETHING AWFUL MIGHT HAPPEN: MORE THAN HALF THE DAYS
3. WORRYING TOO MUCH ABOUT DIFFERENT THINGS: SEVERAL DAYS
5. BEING SO RESTLESS THAT IT IS HARD TO SIT STILL: SEVERAL DAYS
6. BECOMING EASILY ANNOYED OR IRRITABLE: MORE THAN HALF THE DAYS
IF YOU CHECKED OFF ANY PROBLEMS ON THIS QUESTIONNAIRE, HOW DIFFICULT HAVE THESE PROBLEMS MADE IT FOR YOU TO DO YOUR WORK, TAKE CARE OF THINGS AT HOME, OR GET ALONG WITH OTHER PEOPLE: SOMEWHAT DIFFICULT
GAD7 TOTAL SCORE: 11
2. NOT BEING ABLE TO STOP OR CONTROL WORRYING: SEVERAL DAYS

## 2018-06-22 ASSESSMENT — PAIN SCALES - GENERAL: PAINLEVEL: NO PAIN (0)

## 2018-06-22 ASSESSMENT — PATIENT HEALTH QUESTIONNAIRE - PHQ9: 5. POOR APPETITE OR OVEREATING: MORE THAN HALF THE DAYS

## 2018-06-22 NOTE — PATIENT INSTRUCTIONS
Fluid behind the ear drum of right ear    Prednisone 20mg once daily in the morning with food  flonase nasal spray 2 sprays each  Nostril once daily  Amoxicillin twice daily x 10 d    Prednisone: This is a strong anti-inflammatory.   Primary side effects can include insomnia (trouble with sleep), increased appetite, and irritability/moodiness.  By dosing this medication earlier in the day (taking in the morning and/or at lunch) can help reduce the sleep issues.    Do not take any NSAIDs while taking prednisone (examples of NSAIDs: ibuprofen, Advil, Aleve, naproxen, diclofenac, celebrex, etc).

## 2018-06-22 NOTE — MR AVS SNAPSHOT
After Visit Summary   6/22/2018    Barbara Yates    MRN: [id number]           Patient Information     Date Of Birth          1980        Visit Information        Provider Department      6/22/2018 2:40 PM Tamiko Santos PA-C [facility]        Today's Diagnoses     Right acute serous otitis media, recurrence not specified    -  1    Dysfunction of right eustachian tube          Care Instructions    Fluid behind the ear drum of right ear    Prednisone 20mg once daily in the morning with food  flonase nasal spray 2 sprays each  Nostril once daily  Amoxicillin twice daily x 10 d    Prednisone: This is a strong anti-inflammatory.   Primary side effects can include insomnia (trouble with sleep), increased appetite, and irritability/moodiness.  By dosing this medication earlier in the day (taking in the morning and/or at lunch) can help reduce the sleep issues.    Do not take any NSAIDs while taking prednisone (examples of NSAIDs: ibuprofen, Advil, Aleve, naproxen, diclofenac, celebrex, etc).              Follow-ups after your visit        Who to contact     If you have questions or need follow up information about today's clinic visit or your schedule please contact [facility] directly at [phone].  Normal or non-critical lab and imaging results will be communicated to you by Immunologixhart, letter or phone within 4 business days after the clinic has received the results. If you do not hear from us within 7 days, please contact the clinic through Immunologixhart or phone. If you have a critical or abnormal lab result, we will notify you by phone as soon as possible.  Submit refill requests through [employer] or call your pharmacy and they will forward the refill request to us. Please allow 3 business days for your refill to be completed.          Additional Information About Your Visit        ImmunologixharVideoGenie Information     [employer] lets you send messages to your doctor, view your test  "results, renew your prescriptions, schedule appointments and more. To sign up, go to [email or web address] . Click on \"Log in\" on the left side of the screen, which will take you to the Welcome page. Then click on \"Sign up Now\" on the right side of the page.     You will be asked to enter the access code listed below, as well as some personal information. Please follow the directions to create your username and password.     Your access code is: [email or web address]  Expires: 2018 10:45 PM     Your access code will  in 90 days. If you need help or a new code, please call your [place] clinic or [phone].        Care EveryWhere ID     This is your Care EveryWhere ID. This could be used by other organizations to access your [place] medical records  [id number]        Your Vitals Were     Pulse Temperature Respirations Height Last Period Pulse Oximetry    71 98.3  F (36.8  C) (Temporal) 18 5' 3\" (1.6 m) 2018 (Exact Date) 99%    BMI (Body Mass Index)                   23.91 kg/m2            Blood Pressure from Last 3 Encounters:   18 102/64   18 100/62   10/31/17 96/60    Weight from Last 3 Encounters:   18 135 lb (61.2 kg)   18 132 lb (59.9 kg)   10/31/17 120 lb (54.4 kg)              Today, you had the following     No orders found for display         Today's Medication Changes          These changes are accurate as of 18  3:17 PM.  If you have any questions, ask your nurse or doctor.               Start taking these medicines.        Dose/Directions    amoxicillin 875 MG tablet   Commonly known as:  AMOXIL   Used for:  Right acute serous otitis media, recurrence not specified   Started by:  Tamiko Santos PA-C        Dose:  875 mg   Take 1 tablet (875 mg) by mouth 2 times daily   Quantity:  20 tablet   Refills:  0       fluticasone 50 MCG/ACT spray   Commonly known as:  FLONASE   Used for:  Right acute serous otitis media, recurrence not specified, Dysfunction of " right eustachian tube   Started by:  Tamiko Santos PA-C        Dose:  1-2 spray   Spray 1-2 sprays into both nostrils daily   Quantity:  1 Bottle   Refills:  0       predniSONE 20 MG tablet   Commonly known as:  DELTASONE   Used for:  Right acute serous otitis media, recurrence not specified, Dysfunction of right eustachian tube   Started by:  Tamiko Santos PA-C        Dose:  20 mg   Take 1 tablet (20 mg) by mouth daily   Quantity:  5 tablet   Refills:  0            Where to get your medicines      These medications were sent to [facility] [address] [facility] [address] AT [facility] [address]  [address], [facility] [address]     Phone:  [phone]     amoxicillin 875 MG tablet    fluticasone 50 MCG/ACT spray    predniSONE 20 MG tablet                Primary Care Provider Office Phone # Fax #    Nicole MANJULA [facility] [phone] [address] [facility] [address]        Equal Access to Services     [facility]: Hadii aad ku hadasho Soomaali, waaxda luqadaha, qaybta kaalmada adeegyada, waxay idiin hayaan jose arceo . So [facility] [phone].    ATENCIÓN: Si habla español, tiene a colse disposición servicios gratuitos de asistencia lingüística. [facility] [phone].    We comply with applicable federal civil rights laws and [place] laws. We do not discriminate on the basis of race, color, national origin, age, disability, sex, sexual orientation, or gender identity.            Thank you!     Thank you for choosing [facility]  for your care. Our goal is always to provide you with excellent care. Hearing back from our patients is one way we can continue to improve our services. Please take a few minutes to complete the written survey that you may receive in the mail after your visit with us. Thank you!             Your Updated Medication List - Protect others around you: Learn how to safely use, store and throw away your  medicines at www.disposemymeds.org.          This list is accurate as of 6/22/18  3:17 PM.  Always use your most recent med list.                   Brand Name Dispense Instructions for use Diagnosis    albuterol 108 (90 Base) MCG/ACT Inhaler    PROAIR HFA/PROVENTIL HFA/VENTOLIN HFA    2 Inhaler    Inhale 2 puffs into the lungs every 6 hours    Cough       amoxicillin 875 MG tablet    AMOXIL    20 tablet    Take 1 tablet (875 mg) by mouth 2 times daily    Right acute serous otitis media, recurrence not specified       busPIRone 15 MG tablet    BUSPAR    180 tablet    TAKE 1 TABLET TWICE A DAY    Generalized anxiety disorder       CALCIUM + D PO      Take  by mouth.        desonide 0.05 % cream    DESOWEN     Reported on 5/8/2017        DRAMAMINE PO      Take  by mouth. As needed for dizziness        fluticasone 50 MCG/ACT spray    FLONASE    1 Bottle    Spray 1-2 sprays into both nostrils daily    Right acute serous otitis media, recurrence not specified, Dysfunction of right eustachian tube       predniSONE 20 MG tablet    DELTASONE    5 tablet    Take 1 tablet (20 mg) by mouth daily    Right acute serous otitis media, recurrence not specified, Dysfunction of right eustachian tube       PRENATAL MULTIVIT-IRON PO      Reported on 5/8/2017        SLOW FE PO      Reported on 5/8/2017        traZODone 50 MG tablet    DESYREL    90 tablet    TAKE ONE-HALF (1/2) TABLET NIGHTLY AS NEEDED FOR SLEEP    Disturbance in sleep behavior       venlafaxine 75 MG 24 hr capsule    EFFEXOR-XR     Take 75 mg by mouth 3 times daily        zyrTEC 10 MG Chew   Generic drug:  cetirizine      Take by mouth daily Reported on 5/8/2017

## 2018-06-22 NOTE — PROGRESS NOTES
"  SUBJECTIVE:   Barbara Yates is a 37 year old female who presents to clinic today for the following health issues:      HPI  Acute Illness   Acute illness concerns: sinus problem  Onset: 3 days   Was trying to blow nose to clear it out after nasal rinse and felt a pop in right ear and was painful. No drainage from ear. Feels like something in there. Using q-tips after shower. Hearing is muffled.  Right nostril very congested. Feels like whole right side is \"not cooperating\". Didn't get much out with nasal rinse.   Sudafed- not helped.   Maybe some seasonal allergies. Zyrtec but not used lately. Not been symptomatic yet this season other than itchy eyes- but using eye drops and feeling well with that.       Fever: no     Chills/Sweats: no     Headache (location?): YES    Sinus Pressure:YES    Conjunctivitis:  no    Ear Pain: YES: right ear sharp pain / plugged     Rhinorrhea: no     Congestion: YES    Sore Throat: YES     Cough: YES , 2 days no sputum     Wheeze: no     Decreased Appetite: no     Nausea: no     Vomiting: no     Diarrhea:  no     Dysuria/Freq.: no     Fatigue/Achiness: no     Sick/Strep Exposure: no      Therapies Tried and outcome: Patient tried sudadfed for congestion and plugged ears.     Problem list and histories reviewed & adjusted, as indicated.  Additional history: as documented    Patient Active Problem List   Diagnosis     Disturbance in sleep behavior     Urinary frequency     Papanicolaou smear of cervix with low grade squamous intraepithelial lesion (LGSIL)     Other abnormal Papanicolaou smear of cervix and cervical HPV(795.09)     CARDIOVASCULAR SCREENING; LDL GOAL LESS THAN 160     Generalized anxiety disorder     Moderate major depression (H)     Anal fissure     Acquired subluxation of left patella, subsequent encounter     Past Surgical History:   Procedure Laterality Date     ARTHROSCOPY KNEE RT/LT Left 9/8/16     AS CLOSED TX NASAL BONE FRACTURE W/ STABILIZATION  3/1/16     " "EXAM OF VAGINA,COLPOSCOPY  2006, 2005     EYE SURGERY  2008    Lasik eye surgery       Social History   Substance Use Topics     Smoking status: Former Smoker     Years: 15.00     Types: Cigarettes     Smokeless tobacco: Never Used      Comment: quit date 8/2010      Alcohol use Yes      Comment: rarely     Family History   Problem Relation Age of Onset     Hypertension Mother      Unknown/Adopted Father      Family History Negative Other          Current Outpatient Prescriptions   Medication Sig Dispense Refill     albuterol (PROAIR HFA/PROVENTIL HFA/VENTOLIN HFA) 108 (90 BASE) MCG/ACT Inhaler Inhale 2 puffs into the lungs every 6 hours 2 Inhaler 0     busPIRone (BUSPAR) 15 MG tablet TAKE 1 TABLET TWICE A  tablet 1     Calcium Carbonate-Vitamin D (CALCIUM + D PO) Take  by mouth.       cetirizine HCl (ZYRTEC) 10 MG CHEW Take by mouth daily Reported on 5/8/2017       Ferrous Sulfate (SLOW FE PO) Reported on 5/8/2017       Prenatal Vit-Fe Sulfate-FA (PRENATAL MULTIVIT-IRON PO) Reported on 5/8/2017       traZODone (DESYREL) 50 MG tablet TAKE ONE-HALF (1/2) TABLET NIGHTLY AS NEEDED FOR SLEEP 90 tablet 0     venlafaxine (EFFEXOR-XR) 75 MG 24 hr capsule Take 75 mg by mouth 3 times daily       desonide (DESOWEN) 0.05 % cream Reported on 5/8/2017       DimenhyDRINATE (DRAMAMINE PO) Take  by mouth. As needed for dizziness       Allergies   Allergen Reactions     Seasonal Allergies      BP Readings from Last 3 Encounters:   06/22/18 102/64   05/18/18 100/62   10/31/17 96/60    Wt Readings from Last 3 Encounters:   06/22/18 135 lb (61.2 kg)   05/18/18 132 lb (59.9 kg)   10/31/17 120 lb (54.4 kg)                  Labs reviewed in EPIC    ROS:  Constitutional, HEENT, cardiovascular, pulmonary, gi and gu systems are negative, except as otherwise noted.    OBJECTIVE:     /64  Pulse 71  Temp 98.3  F (36.8  C) (Temporal)  Resp 18  Ht 5' 3\" (1.6 m)  Wt 135 lb (61.2 kg)  LMP 06/18/2018 (Exact Date)  SpO2 99%  BMI " 23.91 kg/m2  Body mass index is 23.91 kg/(m^2).  GENERAL: well appearing, alert and no distress  EYES: Eyes grossly normal to inspection, PERRL and conjunctivae and sclerae normal  HENT: Normal cephalic/atraumatic. Maxillary sinuses on right TTP. Frontal nontender. Ear canals clear. RIGHT TM pearly with copious clear fluid with bubbles. Left TM normal, no effusion. Nose mucosa erythematous and swollen turbinates. Lips normal, no lesions. Buccal muscosa moist. Soft palate no lesions or ulcers. Tonsils normal, no significant erythema, no exudates. Uvula midline. Posterior oropharynx normal.   NECK: no adenopathy and no asymmetry, masses, or scars  RESP: lungs clear to auscultation - no rales, rhonchi or wheezes  CV: regular rate and rhythm, normal S1 S2, no S3 or S4, no murmur, click or rub  SKIN: no suspicious lesions or rashes      Diagnostic Test Results:  none     ASSESSMENT/PLAN:     1. Right acute serous otitis media, recurrence not specified  TM intact. Copious clear fluid. Treat as below. Follow up if worsening or not improving.   Discussed possible side effects of prednisone and how to take.   - predniSONE (DELTASONE) 20 MG tablet; Take 1 tablet (20 mg) by mouth daily  Dispense: 5 tablet; Refill: 0  - fluticasone (FLONASE) 50 MCG/ACT spray; Spray 1-2 sprays into both nostrils daily  Dispense: 1 Bottle; Refill: 0  - amoxicillin (AMOXIL) 875 MG tablet; Take 1 tablet (875 mg) by mouth 2 times daily  Dispense: 20 tablet; Refill: 0    2. Dysfunction of right eustachian tube  - predniSONE (DELTASONE) 20 MG tablet; Take 1 tablet (20 mg) by mouth daily  Dispense: 5 tablet; Refill: 0  - fluticasone (FLONASE) 50 MCG/ACT spray; Spray 1-2 sprays into both nostrils daily  Dispense: 1 Bottle; Refill: 0    Follow Up: For worsening or changing symptoms, non-improvement as expected/discussed, questions regarding your medications or treatment plan patient was instructed to contact the clinic. Discussed parameters for follow up  and included in After Visit Summary given to patient.      Tamiko Santos PA-C  [facility]

## 2018-06-23 ASSESSMENT — ANXIETY QUESTIONNAIRES: GAD7 TOTAL SCORE: 11

## 2018-06-25 ENCOUNTER — RADIANT APPOINTMENT (OUTPATIENT)
Dept: GENERAL RADIOLOGY | Facility: OTHER | Age: 38
End: 2018-06-25
Attending: NURSE PRACTITIONER
Payer: COMMERCIAL

## 2018-06-25 ENCOUNTER — TELEPHONE (OUTPATIENT)
Dept: FAMILY MEDICINE | Facility: OTHER | Age: 38
End: 2018-06-25

## 2018-06-25 ENCOUNTER — OFFICE VISIT (OUTPATIENT)
Dept: FAMILY MEDICINE | Facility: OTHER | Age: 38
End: 2018-06-25
Payer: COMMERCIAL

## 2018-06-25 VITALS
HEART RATE: 98 BPM | OXYGEN SATURATION: 98 % | BODY MASS INDEX: 24.27 KG/M2 | TEMPERATURE: 98.6 F | RESPIRATION RATE: 22 BRPM | SYSTOLIC BLOOD PRESSURE: 100 MMHG | DIASTOLIC BLOOD PRESSURE: 64 MMHG | WEIGHT: 137 LBS

## 2018-06-25 DIAGNOSIS — J01.00 ACUTE NON-RECURRENT MAXILLARY SINUSITIS: ICD-10-CM

## 2018-06-25 DIAGNOSIS — R06.02 SOB (SHORTNESS OF BREATH): ICD-10-CM

## 2018-06-25 DIAGNOSIS — D50.9 IRON DEFICIENCY ANEMIA, UNSPECIFIED IRON DEFICIENCY ANEMIA TYPE: ICD-10-CM

## 2018-06-25 DIAGNOSIS — R06.02 SOB (SHORTNESS OF BREATH): Primary | ICD-10-CM

## 2018-06-25 LAB
BASOPHILS # BLD AUTO: 0 10E9/L (ref 0–0.2)
BASOPHILS NFR BLD AUTO: 0.3 %
D DIMER PPP FEU-MCNC: 0.3 UG/ML FEU (ref 0–0.5)
DIFFERENTIAL METHOD BLD: ABNORMAL
EOSINOPHIL # BLD AUTO: 0 10E9/L (ref 0–0.7)
EOSINOPHIL NFR BLD AUTO: 0.4 %
ERYTHROCYTE [DISTWIDTH] IN BLOOD BY AUTOMATED COUNT: 14.2 % (ref 10–15)
HCT VFR BLD AUTO: 34.4 % (ref 35–47)
HGB BLD-MCNC: 11.1 G/DL (ref 11.7–15.7)
LYMPHOCYTES # BLD AUTO: 0.9 10E9/L (ref 0.8–5.3)
LYMPHOCYTES NFR BLD AUTO: 8.6 %
MCH RBC QN AUTO: 29.2 PG (ref 26.5–33)
MCHC RBC AUTO-ENTMCNC: 32.3 G/DL (ref 31.5–36.5)
MCV RBC AUTO: 91 FL (ref 78–100)
MONOCYTES # BLD AUTO: 0.5 10E9/L (ref 0–1.3)
MONOCYTES NFR BLD AUTO: 4.3 %
NEUTROPHILS # BLD AUTO: 8.9 10E9/L (ref 1.6–8.3)
NEUTROPHILS NFR BLD AUTO: 86.4 %
PLATELET # BLD AUTO: 250 10E9/L (ref 150–450)
RBC # BLD AUTO: 3.8 10E12/L (ref 3.8–5.2)
WBC # BLD AUTO: 10.4 10E9/L (ref 4–11)

## 2018-06-25 PROCEDURE — 36415 COLL VENOUS BLD VENIPUNCTURE: CPT | Performed by: NURSE PRACTITIONER

## 2018-06-25 PROCEDURE — 71046 X-RAY EXAM CHEST 2 VIEWS: CPT

## 2018-06-25 PROCEDURE — 85379 FIBRIN DEGRADATION QUANT: CPT | Performed by: NURSE PRACTITIONER

## 2018-06-25 PROCEDURE — 85025 COMPLETE CBC W/AUTO DIFF WBC: CPT | Performed by: NURSE PRACTITIONER

## 2018-06-25 PROCEDURE — 99214 OFFICE O/P EST MOD 30 MIN: CPT | Performed by: NURSE PRACTITIONER

## 2018-06-25 ASSESSMENT — PAIN SCALES - GENERAL: PAINLEVEL: MODERATE PAIN (4)

## 2018-06-25 NOTE — TELEPHONE ENCOUNTER
Left message on work (noted as best day time number) and cell to return our call.  See KV's note below regarding today's appointment.  RN to triage/huddle with her.    Harvey Dooley, RN, BSN

## 2018-06-25 NOTE — MR AVS SNAPSHOT
"              After Visit Summary   6/25/2018    Barbara Yates    MRN: [id number]           Patient Information     Date Of Birth          1980        Visit Information        Provider Department      6/25/2018 1:20 PM Bebe Bedoya APRN CNP [facility]        Today's Diagnoses     SOB (shortness of breath)    -  1    Acute non-recurrent maxillary sinusitis          Care Instructions    - Stop Amoxicillin and start Augmentin  - Recommend continuing to use Flonase   - Finish Prednisone treatment  - Chest xray today and labs.   - Start Zyrtec- D daily   - Follow up if any worsening symptoms.   - Restart iron medication daily and recheck in 6 weeks with PCP.     MANJULA Bender CNP            Follow-ups after your visit        Follow-up notes from your care team     Return in about 6 weeks (around 8/6/2018).      Who to contact     If you have questions or need follow up information about today's clinic visit or your schedule please contact [facility] directly at [phone].  Normal or non-critical lab and imaging results will be communicated to you by memory lane syndicationshart, letter or phone within 4 business days after the clinic has received the results. If you do not hear from us within 7 days, please contact the clinic through [employer] or phone. If you have a critical or abnormal lab result, we will notify you by phone as soon as possible.  Submit refill requests through [employer] or call your pharmacy and they will forward the refill request to us. Please allow 3 business days for your refill to be completed.          Additional Information About Your Visit        memory lane syndicationsharFeebbo Information     [employer] lets you send messages to your doctor, view your test results, renew your prescriptions, schedule appointments and more. To sign up, go to [email or web address] . Click on \"Log in\" on the left side of the screen, which will take you to the Welcome page. Then click on \"Sign up Now\" on " the right side of the page.     You will be asked to enter the access code listed below, as well as some personal information. Please follow the directions to create your username and password.     Your access code is: [email or web address]  Expires: 2018  1:20 PM     Your access code will  in 90 days. If you need help or a new code, please call your [facility] or [phone].        Care EveryWhere ID     This is your Care EveryWhere ID. This could be used by other organizations to access your [facility] medical records  [id number]        Your Vitals Were     Pulse Temperature Respirations Last Period Pulse Oximetry BMI (Body Mass Index)    98 98.6  F (37  C) 22 2018 (Exact Date) 98% 24.27 kg/m2       Blood Pressure from Last 3 Encounters:   18 100/64   18 102/64   18 100/62    Weight from Last 3 Encounters:   18 137 lb (62.1 kg)   18 135 lb (61.2 kg)   18 132 lb (59.9 kg)              We Performed the Following     CBC with platelets and differential     D dimer quantitative          Today's Medication Changes          These changes are accurate as of 18  2:21 PM.  If you have any questions, ask your nurse or doctor.               Start taking these medicines.        Dose/Directions    amoxicillin-clavulanate 875-125 MG per tablet   Commonly known as:  AUGMENTIN   Used for:  Acute non-recurrent maxillary sinusitis   Started by:  Bebe Bedoya APRN CNP        Dose:  1 tablet   Take 1 tablet by mouth 2 times daily   Quantity:  20 tablet   Refills:  0         Stop taking these medicines if you haven't already. Please contact your care team if you have questions.     amoxicillin 875 MG tablet   Commonly known as:  AMOXIL   Stopped by:  Bebe Bedoya APRN CNP                Where to get your medicines      These medications were sent to [facility] [address] [place], [place] - [address] AT [facility] [address]  [address], [place]  [place] [address]     Phone:  [phone]     amoxicillin-clavulanate 875-125 MG per tablet                Primary Care Provider Office Phone # Fax #    Nicole BIRCH MANJULA Soto [facility] [phone] [address] [place] [address]        Equal Access to Services     [facility]: Hadii aad ku hadasho Soomaali, waaxda luqadaha, qaybta kaalmada adeegyada, waxay idiin hayaan aderonen khkarishma la'aan . So [facility] [phone].    ATENCIÓN: Si habla español, tiene a coles disposición servicios gratuitos de asistencia lingüística. Maria Eugenia al [phone].    We comply with applicable federal civil rights laws and [place] laws. We do not discriminate on the basis of race, color, national origin, age, disability, sex, sexual orientation, or gender identity.            Thank you!     Thank you for choosing [facility]  for your care. Our goal is always to provide you with excellent care. Hearing back from our patients is one way we can continue to improve our services. Please take a few minutes to complete the written survey that you may receive in the mail after your visit with us. Thank you!             Your Updated Medication List - Protect others around you: Learn how to safely use, store and throw away your medicines at www.disposemymeds.org.          This list is accurate as of 6/25/18  2:21 PM.  Always use your most recent med list.                   Brand Name Dispense Instructions for use Diagnosis    albuterol 108 (90 Base) MCG/ACT Inhaler    PROAIR HFA/PROVENTIL HFA/VENTOLIN HFA    2 Inhaler    Inhale 2 puffs into the lungs every 6 hours    Cough       amoxicillin-clavulanate 875-125 MG per tablet    AUGMENTIN    20 tablet    Take 1 tablet by mouth 2 times daily    Acute non-recurrent maxillary sinusitis       busPIRone 15 MG tablet    BUSPAR    180 tablet    TAKE 1 TABLET TWICE A DAY    Generalized anxiety disorder       CALCIUM + D PO      Take  by mouth.        desonide 0.05 % cream     DESOWEN     Reported on 5/8/2017        DRAMAMINE PO      Take  by mouth. As needed for dizziness        fluticasone 50 MCG/ACT spray    FLONASE    1 Bottle    Spray 1-2 sprays into both nostrils daily    Right acute serous otitis media, recurrence not specified, Dysfunction of right eustachian tube       predniSONE 20 MG tablet    DELTASONE    5 tablet    Take 1 tablet (20 mg) by mouth daily    Right acute serous otitis media, recurrence not specified, Dysfunction of right eustachian tube       PRENATAL MULTIVIT-IRON PO      Reported on 5/8/2017        SLOW FE PO      Reported on 5/8/2017        traZODone 50 MG tablet    DESYREL    90 tablet    TAKE ONE-HALF (1/2) TABLET NIGHTLY AS NEEDED FOR SLEEP    Disturbance in sleep behavior       venlafaxine 75 MG 24 hr capsule    EFFEXOR-XR     Take 75 mg by mouth 3 times daily        zyrTEC 10 MG Chew   Generic drug:  cetirizine      Take by mouth daily Reported on 5/8/2017

## 2018-06-25 NOTE — TELEPHONE ENCOUNTER
Patient on my acute only slot for 1:20. She was seen on Friday and given steroids, antibiotics and an extensive plan for her sinuses. I would recommend that we give this time to work as I am not sure there is much else I would change as it has only been the weekend with this treatment. Please triage.     MANJULA Bender CNP

## 2018-06-25 NOTE — PROGRESS NOTES
SUBJECTIVE:   Barbara Yates is a 37 year old female who presents to clinic today for the following health issues:      HPI  RESPIRATORY SYMPTOMS      Duration: 6-7 days ago -- was seen on Friday    Description  nasal congestion, rhinorrhea, facial pain/pressure, cough, wheezing, chills, ear pain right, headache, fatigue/malaise, hoarse voice and conjunctival irritation, coughing and shortness of breath with it.     Severity: moderate-  severe    Accompanying signs and symptoms: see above    History (predisposing factors):  none    Precipitating or alleviating factors: None    Therapies tried and outcome:  Prednisone, amoxicillin,  Inhaler, flonase , advil , sudafed and nasal wash, humidifier    Chest is bothering her as she is constantly coughing. Shortness of breath. Albuterol helps.     No fevers that she is aware of, waking up sweating.     No history of clots, no history of asthma or COPD, No recent hospitalizations, no birth control medications. 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       Problem list and histories reviewed & adjusted, as indicated.  Additional history: as documented        Current Outpatient Prescriptions   Medication Sig Dispense Refill     albuterol (PROAIR HFA/PROVENTIL HFA/VENTOLIN HFA) 108 (90 BASE) MCG/ACT Inhaler Inhale 2 puffs into the lungs every 6 hours 2 Inhaler 0     amoxicillin (AMOXIL) 875 MG tablet Take 1 tablet (875 mg) by mouth 2 times daily 20 tablet 0     busPIRone (BUSPAR) 15 MG tablet TAKE 1 TABLET TWICE A  tablet 1     Calcium Carbonate-Vitamin D (CALCIUM + D PO) Take  by mouth.       fluticasone (FLONASE) 50 MCG/ACT spray Spray 1-2 sprays into both nostrils daily 1 Bottle 0     predniSONE (DELTASONE) 20 MG tablet Take 1 tablet (20 mg) by mouth daily 5 tablet 0     Prenatal Vit-Fe Sulfate-FA (PRENATAL MULTIVIT-IRON PO) Reported on 5/8/2017       traZODone (DESYREL) 50 MG tablet TAKE ONE-HALF (1/2) TABLET NIGHTLY AS NEEDED FOR SLEEP 90 tablet 0     venlafaxine  (EFFEXOR-XR) 75 MG 24 hr capsule Take 75 mg by mouth 3 times daily       cetirizine HCl (ZYRTEC) 10 MG CHEW Take by mouth daily Reported on 5/8/2017       desonide (DESOWEN) 0.05 % cream Reported on 5/8/2017       DimenhyDRINATE (DRAMAMINE PO) Take  by mouth. As needed for dizziness       Ferrous Sulfate (SLOW FE PO) Reported on 5/8/2017       BP Readings from Last 3 Encounters:   06/25/18 100/64   06/22/18 102/64   05/18/18 100/62    Wt Readings from Last 3 Encounters:   06/25/18 137 lb (62.1 kg)   06/22/18 135 lb (61.2 kg)   05/18/18 132 lb (59.9 kg)                    ROS:  CONSTITUTIONAL: NEGATIVE for fever, chills, change in weight  CV: NEGATIVE for chest pain, palpitations or peripheral edema    OBJECTIVE:     /64  Pulse 98  Temp 98.6  F (37  C)  Resp 22  Wt 137 lb (62.1 kg)  LMP 06/18/2018 (Exact Date)  SpO2 98%  BMI 24.27 kg/m2  Body mass index is 24.27 kg/(m^2).  GENERAL: healthy, alert and no distress  EYES: Eyes grossly normal to inspection, PERRL and conjunctivae and sclerae normal  HENT: normal cephalic/atraumatic, right ear: clear effusion, left ear: clear effusion, nose and mouth without ulcers or lesions, nasal mucosa edematous , oropharynx clear, oral mucous membranes moist and sinuses: maxillary tenderness on bilateral  NECK: no adenopathy, no asymmetry, masses, or scars and thyroid normal to palpation  RESP: lungs clear to auscultation - no rales, rhonchi or wheezes  CV: regular rate and rhythm, normal S1 S2, no S3 or S4, no murmur, click or rub, no peripheral edema and peripheral pulses strong  MS: no gross musculoskeletal defects noted, no edema  SKIN: no suspicious lesions or rashes  NEURO: Normal strength and tone, mentation intact and speech normal  PSYCH: mentation appears normal, affect normal/bright    Diagnostic Test Results:  Results for orders placed or performed in visit on 06/25/18   D dimer quantitative   Result Value Ref Range    D Dimer 0.3 0.0 - 0.50 ug/ml FEU   CBC  with platelets and differential   Result Value Ref Range    WBC 10.4 4.0 - 11.0 10e9/L    RBC Count 3.80 3.8 - 5.2 10e12/L    Hemoglobin 11.1 (L) 11.7 - 15.7 g/dL    Hematocrit 34.4 (L) 35.0 - 47.0 %    MCV 91 78 - 100 fl    MCH 29.2 26.5 - 33.0 pg    MCHC 32.3 31.5 - 36.5 g/dL    RDW 14.2 10.0 - 15.0 %    Platelet Count 250 150 - 450 10e9/L    Diff Method Automated Method     % Neutrophils 86.4 %    % Lymphocytes 8.6 %    % Monocytes 4.3 %    % Eosinophils 0.4 %    % Basophils 0.3 %    Absolute Neutrophil 8.9 (H) 1.6 - 8.3 10e9/L    Absolute Lymphocytes 0.9 0.8 - 5.3 10e9/L    Absolute Monocytes 0.5 0.0 - 1.3 10e9/L    Absolute Eosinophils 0.0 0.0 - 0.7 10e9/L    Absolute Basophils 0.0 0.0 - 0.2 10e9/L       ASSESSMENT/PLAN:       1. SOB (shortness of breath)  - Likely related to sinus congestion and nasal congestion.   - D dimer was negative and chest xray normal  - D dimer quantitative  - XR Chest 2 Views; Future  - CBC with platelets and differential    2. Acute non-recurrent maxillary sinusitis  - - Stop Amoxicillin and start Augmentin  - Recommend continuing to use Flonase   - Finish Prednisone treatment  - Chest xray today and labs.   - Start Zyrtec- D daily   - Follow up if any worsening symptoms.   - Restart iron medication daily and recheck in 6 weeks with PCP.   - amoxicillin-clavulanate (AUGMENTIN) 875-125 MG per tablet; Take 1 tablet by mouth 2 times daily  Dispense: 20 tablet; Refill: 0    3. Iron deficiency anemia, unspecified iron deficiency anemia type  - Likely contributing to fatigue and shortness of breath  - Will check today  - Restart iron supplement as previously recommended by PCP    The patient indicates understanding of these issues and agrees with the plan.    Patient Instructions   - Stop Amoxicillin and start Augmentin  - Recommend continuing to use Flonase   - Finish Prednisone treatment  - Chest xray today and labs.   - Start Zyrtec- D daily   - Follow up if any worsening symptoms.   -  Restart iron medication daily and recheck in 6 weeks with PCP.     MANJULA Bender CNP, APRN CNP  [facility]

## 2018-06-25 NOTE — PATIENT INSTRUCTIONS
- Stop Amoxicillin and start Augmentin  - Recommend continuing to use Flonase   - Finish Prednisone treatment  - Chest xray today and labs.   - Start Zyrtec- D daily   - Follow up if any worsening symptoms.   - Restart iron medication daily and recheck in 6 weeks with PCP.     MANJULA Bender CNP

## 2018-06-25 NOTE — TELEPHONE ENCOUNTER
----- Message from MANJULA Alonso CNP sent at 6/25/2018  5:22 PM CDT -----  Chest xray normal.   MANJULA Bender CNP

## 2018-06-26 DIAGNOSIS — R05.9 COUGH: ICD-10-CM

## 2018-06-27 RX ORDER — ALBUTEROL SULFATE 90 UG/1
AEROSOL, METERED RESPIRATORY (INHALATION)
Qty: 17 G | Refills: 0 | Status: SHIPPED | OUTPATIENT
Start: 2018-06-27 | End: 2019-11-07

## 2018-06-27 NOTE — TELEPHONE ENCOUNTER
"Requested Prescriptions   Pending Prescriptions Disp Refills     PROAIR  (90 Base) MCG/ACT inhaler [Pharmacy Med Name: ALBUTEROL(PROAIR)HFA IN 8.5G 90MCG] 17 g 0     Sig: USE 2 INHALATIONS EVERY 6 HOURS    Asthma Maintenance Inhalers - Anticholinergics Passed    6/26/2018  1:14 PM       Passed - Patient is age 12 years or older       Passed - Recent (12 mo) or future (30 days) visit within the authorizing provider's specialty    Patient had office visit in the last 12 months or has a visit in the next 30 days with authorizing provider or within the authorizing provider's specialty.  See \"Patient Info\" tab in inbasket, or \"Choose Columns\" in Meds & Orders section of the refill encounter.            Last Written Prescription Date:   albuterol (PROAIR HFA/PROVENTIL HFA/VENTOLIN HFA) 108 (90 BASE) MCG/ACT Inhaler 2 Inhaler 0 10/31/2017  No      Sig - Route: Inhale 2 puffs into the lungs every 6 hours - Inhalation     Last office visit: 6/25/2018 with prescribing provider:  AMARIS   Future Office Visit:  NA  No flowsheet data found. NO ACT on file    Routing refill request to provider for review/approval because:  Associated DX not on protocol for COUGH            "

## 2018-07-02 NOTE — PROGRESS NOTES
SUBJECTIVE:   Barbara Yates is a 37 year old female who presents to clinic today for the following health issues:      History of Present Illness     Diet:  Regular (no restrictions)  Frequency of exercise:  1 day/week  Duration of exercise:  Less than 15 minutes  Taking medications regularly:  Yes  Medication side effects:  None  Additional concerns today:  No       Patient in clinic today to follow up on respiratory issues. Saw KP on 06/22, KV on 06/25.   Patient was given Prednisone and Flonase on 06/22, Augmentin on 06/25.     Acute Illness   Acute illness concerns: sinus/ feels plugged   Onset: 2 weeks     Fever: no     Chills/Sweats: no     Headache (location?): YES    Sinus Pressure:YES, above eye brows and behind nose     Conjunctivitis:  no    Ear Pain: bilateral,  more in right ear  pain     Rhinorrhea: YES    Congestion: YES    Sore Throat: YES, a little bit      Cough: YES-non-productive    Wheeze: no     Decreased Appetite: YES, food is not tasting right     Nausea: no     Vomiting: no     Diarrhea:  no     Dysuria/Freq.: no     Fatigue/Achiness: Fatigue only     Sick/Strep Exposure: no      Therapies Tried and outcome: Patient tried  Prednisone , currently on  Augmentin,  and Zyrtec D every 12 hours. Patient states she still feels very plugged up.       Problem list and histories reviewed & adjusted, as indicated.  Additional history: as documented    Mood stable.       Patient Active Problem List   Diagnosis     Disturbance in sleep behavior     Urinary frequency     Papanicolaou smear of cervix with low grade squamous intraepithelial lesion (LGSIL)     Other abnormal Papanicolaou smear of cervix and cervical HPV(795.09)     CARDIOVASCULAR SCREENING; LDL GOAL LESS THAN 160     Generalized anxiety disorder     Moderate major depression (H)     Anal fissure     Acquired subluxation of left patella, subsequent encounter     Past Surgical History:   Procedure Laterality Date     ARTHROSCOPY KNEE  RT/LT Left 9/8/16     AS CLOSED TX NASAL BONE FRACTURE W/ STABILIZATION  3/1/16     EXAM OF VAGINA,COLPOSCOPY  2006, 2005     EYE SURGERY  2008    Lasik eye surgery       Social History   Substance Use Topics     Smoking status: Former Smoker     Years: 15.00     Types: Cigarettes     Smokeless tobacco: Never Used      Comment: quit date 8/2010      Alcohol use Yes      Comment: rarely     Family History   Problem Relation Age of Onset     Hypertension Mother      Unknown/Adopted Father      Family History Negative Other          Current Outpatient Prescriptions   Medication Sig Dispense Refill     amoxicillin-clavulanate (AUGMENTIN) 875-125 MG per tablet Take 1 tablet by mouth 2 times daily for 4 days 8 tablet 0     amoxicillin-clavulanate (AUGMENTIN) 875-125 MG per tablet Take 1 tablet by mouth 2 times daily 20 tablet 0     busPIRone (BUSPAR) 15 MG tablet TAKE 1 TABLET TWICE A  tablet 1     Calcium Carbonate-Vitamin D (CALCIUM + D PO) Take  by mouth.       cetirizine HCl (ZYRTEC) 10 MG CHEW Take by mouth daily Reported on 5/8/2017       DimenhyDRINATE (DRAMAMINE PO) Take  by mouth. As needed for dizziness       Ferrous Sulfate (SLOW FE PO) Reported on 5/8/2017       fluticasone (FLONASE) 50 MCG/ACT spray Spray 1-2 sprays into both nostrils daily 1 Bottle 0     Prenatal Vit-Fe Sulfate-FA (PRENATAL MULTIVIT-IRON PO) Reported on 5/8/2017       PROAIR  (90 Base) MCG/ACT inhaler USE 2 INHALATIONS EVERY 6 HOURS 17 g 0     traZODone (DESYREL) 50 MG tablet TAKE ONE-HALF (1/2) TABLET NIGHTLY AS NEEDED FOR SLEEP 90 tablet 0     venlafaxine (EFFEXOR-XR) 75 MG 24 hr capsule Take 75 mg by mouth 3 times daily       desonide (DESOWEN) 0.05 % cream Reported on 5/8/2017       predniSONE (DELTASONE) 20 MG tablet Take 1 tablet (20 mg) by mouth daily (Patient not taking: Reported on 7/3/2018) 5 tablet 0     Allergies   Allergen Reactions     Seasonal Allergies      Recent Labs   Lab Test  10/31/17   0932  09/01/16    "1120  06/18/15   0922   05/05/11   1830  02/08/11   1628   LDL  90   --   73   --    --    --    HDL  56   --   55   --    --    --    TRIG  118   --   75   --    --    --    CR   --    --    --    --   0.48*  0.64   GFRESTIMATED   --    --    --    --   >90  >90   GFRESTBLACK   --    --    --    --   >90  >90   POTASSIUM   --    --    --    --   3.9  3.5   TSH   --   1.21  1.47   < >   --   2.05    < > = values in this interval not displayed.      BP Readings from Last 3 Encounters:   07/03/18 108/64   06/25/18 100/64   06/22/18 102/64    Wt Readings from Last 3 Encounters:   07/03/18 136 lb 12.8 oz (62.1 kg)   06/25/18 137 lb (62.1 kg)   06/22/18 135 lb (61.2 kg)                    ROS:  Constitutional, HEENT, cardiovascular, pulmonary, GI, , musculoskeletal, neuro, skin, endocrine and psych systems are negative, except as otherwise noted.    OBJECTIVE:   /64  Pulse 79  Temp 98.2  F (36.8  C) (Oral)  Resp 14  Ht 5' 3\" (1.6 m)  Wt 136 lb 12.8 oz (62.1 kg)  LMP 06/18/2018 (Exact Date)  SpO2 99%  BMI 24.23 kg/m2  Body mass index is 24.23 kg/(m^2).  GENERAL APPEARANCE: healthy, alert and no distress  EYES: Eyes grossly normal to inspection and conjunctivae and sclerae normal  HENT: ear canals and TM's normal, nose and mouth without ulcers or lesions and nasal mucosa edematous with minimal  Rhinorrhea- left turbinate swelling.   NECK: no adenopathy, no asymmetry, masses, or scars and thyroid normal to palpation  RESP: lungs clear to auscultation - no rales, rhonchi or wheezes  CV: regular rates and rhythm, normal S1 S2, no S3 or S4 and no murmur, click or rub         ASSESSMENT/PLAN:       ICD-10-CM    1. Acute maxillary sinusitis, recurrence not specified J01.00 amoxicillin-clavulanate (AUGMENTIN) 875-125 MG per tablet     Extended Augmentin. Also, is likely needing time, seems improved from initial presentation.   Return to clinic if symptoms persist or worsen. Antipyretics/analgesics prn, fluids, " rest, supportive cares, can continue OTC decongestants.  See orders.     The information in this document, created by the medical scribe for me, accurately reflects the services I personally performed and the decisions made by me. I have reviewed and approved this document for accuracy prior to leaving the patient care area.    MANJULA Guerrero [facility]

## 2018-07-03 ENCOUNTER — OFFICE VISIT (OUTPATIENT)
Dept: FAMILY MEDICINE | Facility: CLINIC | Age: 38
End: 2018-07-03
Payer: COMMERCIAL

## 2018-07-03 VITALS
HEART RATE: 79 BPM | DIASTOLIC BLOOD PRESSURE: 64 MMHG | TEMPERATURE: 98.2 F | RESPIRATION RATE: 14 BRPM | BODY MASS INDEX: 24.24 KG/M2 | HEIGHT: 63 IN | WEIGHT: 136.8 LBS | OXYGEN SATURATION: 99 % | SYSTOLIC BLOOD PRESSURE: 108 MMHG

## 2018-07-03 DIAGNOSIS — J01.00 ACUTE MAXILLARY SINUSITIS, RECURRENCE NOT SPECIFIED: Primary | ICD-10-CM

## 2018-07-03 PROCEDURE — 99213 OFFICE O/P EST LOW 20 MIN: CPT | Performed by: NURSE PRACTITIONER

## 2018-07-03 ASSESSMENT — ANXIETY QUESTIONNAIRES
3. WORRYING TOO MUCH ABOUT DIFFERENT THINGS: SEVERAL DAYS
6. BECOMING EASILY ANNOYED OR IRRITABLE: SEVERAL DAYS
5. BEING SO RESTLESS THAT IT IS HARD TO SIT STILL: NOT AT ALL
GAD7 TOTAL SCORE: 4
2. NOT BEING ABLE TO STOP OR CONTROL WORRYING: SEVERAL DAYS
GAD7 TOTAL SCORE: 4
4. TROUBLE RELAXING: NOT AT ALL
7. FEELING AFRAID AS IF SOMETHING AWFUL MIGHT HAPPEN: NOT AT ALL
7. FEELING AFRAID AS IF SOMETHING AWFUL MIGHT HAPPEN: NOT AT ALL
1. FEELING NERVOUS, ANXIOUS, OR ON EDGE: SEVERAL DAYS
GAD7 TOTAL SCORE: 4

## 2018-07-03 ASSESSMENT — PATIENT HEALTH QUESTIONNAIRE - PHQ9
10. IF YOU CHECKED OFF ANY PROBLEMS, HOW DIFFICULT HAVE THESE PROBLEMS MADE IT FOR YOU TO DO YOUR WORK, TAKE CARE OF THINGS AT HOME, OR GET ALONG WITH OTHER PEOPLE: SOMEWHAT DIFFICULT
SUM OF ALL RESPONSES TO PHQ QUESTIONS 1-9: 7
SUM OF ALL RESPONSES TO PHQ QUESTIONS 1-9: 7

## 2018-07-03 ASSESSMENT — PAIN SCALES - GENERAL: PAINLEVEL: NO PAIN (0)

## 2018-07-03 NOTE — MR AVS SNAPSHOT
"              After Visit Summary   7/3/2018    Barbara Yates    MRN: [id number]           Patient Information     Date Of Birth          1980        Visit Information        Provider Department      7/3/2018 2:20 PM Nicole Soto APRN CNP [facility]        Today's Diagnoses     Acute maxillary sinusitis, recurrence not specified    -  1       Follow-ups after your visit        Who to contact     If you have questions or need follow up information about today's clinic visit or your schedule please [facility] directly at [phone].  Normal or non-critical lab and imaging results will be communicated to you by [employer], letter or phone within 4 business days after the clinic has received the results. If you do not hear from us within 7 days, please contact the clinic through tribrt or phone. If you have a critical or abnormal lab result, we will notify you by phone as soon as possible.  Submit refill requests through [employer] or call your pharmacy and they will forward the refill request to us. Please allow 3 business days for your refill to be completed.          Additional Information About Your Visit        MyChart Information     [employer] gives you secure access to your electronic health record. If you see a primary care provider, you can also send messages to your care team and make appointments. If you have questions, please call your primary care clinic.  If you do not have a primary care provider, please call [phone] and they will assist you.        Care EveryWhere ID     This is your Care EveryWhere ID. This could be used by other organizations to access your [facility] medical records  [id number]        Your Vitals Were     Pulse Temperature Respirations Height Last Period Pulse Oximetry    79 98.2  F (36.8  C) (Oral) 14 5' 3\" (1.6 m) 06/18/2018 (Exact Date) 99%    BMI (Body Mass Index)                   24.23 kg/m2            Blood Pressure from Last 3 " Encounters:   07/03/18 108/64   06/25/18 100/64   06/22/18 102/64    Weight from Last 3 Encounters:   07/03/18 136 lb 12.8 oz (62.1 kg)   06/25/18 137 lb (62.1 kg)   06/22/18 135 lb (61.2 kg)              Today, you had the following     No orders found for display         Today's Medication Changes          These changes are accurate as of 7/3/18  3:27 PM.  If you have any questions, ask your nurse or doctor.               These medicines have changed or have updated prescriptions.        Dose/Directions    * amoxicillin-clavulanate 875-125 MG per tablet   Commonly known as:  AUGMENTIN   This may have changed:  Another medication with the same name was added. Make sure you understand how and when to take each.   Used for:  Acute non-recurrent maxillary sinusitis   Changed by:  Nicole Soto APRN CNP        Dose:  1 tablet   Take 1 tablet by mouth 2 times daily   Quantity:  20 tablet   Refills:  0       * amoxicillin-clavulanate 875-125 MG per tablet   Commonly known as:  AUGMENTIN   This may have changed:  You were already taking a medication with the same name, and this prescription was added. Make sure you understand how and when to take each.   Used for:  Acute maxillary sinusitis, recurrence not specified   Changed by:  Nicole Soto APRN CNP        Dose:  1 tablet   Take 1 tablet by mouth 2 times daily for 4 days   Quantity:  8 tablet   Refills:  0       * Notice:  This list has 2 medication(s) that are the same as other medications prescribed for you. Read the directions carefully, and ask your doctor or other care provider to review them with you.         Where to get your medicines      These medications were sent to [facility] [address] [facility] [address] AT [facility] [address]  [address], [facility] [address]     Phone:  [phone]     amoxicillin-clavulanate 875-125 MG per tablet                Primary Care Provider Office Phone # Fax #    MANJULA Payne CNP  [phone]       [address] [facility] [address]        Equal Access to Services     TERRENCE HARKINS : Hadii alex valera audrey Hutchinson, waaxda luqadaha, qaybta kaalmada yuridia, jaquan hernandezangela ramon. So [facility] [phone].    ATENCIÓN: Si habla español, tiene a coles disposición servicios gratuitos de asistencia lingüística. Llame al [phone].    We comply with applicable federal civil rights laws and [place] laws. We do not discriminate on the basis of race, color, national origin, age, disability, sex, sexual orientation, or gender identity.            Thank you!     Thank you for choosing [facility]  for your care. Our goal is always to provide you with excellent care. Hearing back from our patients is one way we can continue to improve our services. Please take a few minutes to complete the written survey that you may receive in the mail after your visit with us. Thank you!             Your Updated Medication List - Protect others around you: Learn how to safely use, store and throw away your medicines at www.disposemymeds.org.          This list is accurate as of 7/3/18  3:27 PM.  Always use your most recent med list.                   Brand Name Dispense Instructions for use Diagnosis    * amoxicillin-clavulanate 875-125 MG per tablet    AUGMENTIN    20 tablet    Take 1 tablet by mouth 2 times daily    Acute non-recurrent maxillary sinusitis       * amoxicillin-clavulanate 875-125 MG per tablet    AUGMENTIN    8 tablet    Take 1 tablet by mouth 2 times daily for 4 days    Acute maxillary sinusitis, recurrence not specified       busPIRone 15 MG tablet    BUSPAR    180 tablet    TAKE 1 TABLET TWICE A DAY    Generalized anxiety disorder       CALCIUM + D PO      Take  by mouth.        desonide 0.05 % cream    DESOWEN     Reported on 5/8/2017        DRAMAMINE PO      Take  by mouth. As needed for dizziness        fluticasone 50 MCG/ACT spray    FLONASE    1  Bottle    Spray 1-2 sprays into both nostrils daily    Right acute serous otitis media, recurrence not specified, Dysfunction of right eustachian tube       predniSONE 20 MG tablet    DELTASONE    5 tablet    Take 1 tablet (20 mg) by mouth daily    Right acute serous otitis media, recurrence not specified, Dysfunction of right eustachian tube       PRENATAL MULTIVIT-IRON PO      Reported on 5/8/2017        PROAIR  (90 Base) MCG/ACT Inhaler   Generic drug:  albuterol     17 g    USE 2 INHALATIONS EVERY 6 HOURS    Cough       SLOW FE PO      Reported on 5/8/2017        traZODone 50 MG tablet    DESYREL    90 tablet    TAKE ONE-HALF (1/2) TABLET NIGHTLY AS NEEDED FOR SLEEP    Disturbance in sleep behavior       venlafaxine 75 MG 24 hr capsule    EFFEXOR-XR     Take 75 mg by mouth 3 times daily        zyrTEC 10 MG Chew   Generic drug:  cetirizine      Take by mouth daily Reported on 5/8/2017        * Notice:  This list has 2 medication(s) that are the same as other medications prescribed for you. Read the directions carefully, and ask your doctor or other care provider to review them with you.

## 2018-07-04 ASSESSMENT — ANXIETY QUESTIONNAIRES: GAD7 TOTAL SCORE: 4

## 2018-07-04 ASSESSMENT — PATIENT HEALTH QUESTIONNAIRE - PHQ9: SUM OF ALL RESPONSES TO PHQ QUESTIONS 1-9: 7

## 2018-09-17 NOTE — PROGRESS NOTES
"   SUBJECTIVE:   CC: Barbara Yates is an 37 year old woman who presents for preventive health visit.     Physical   Annual:     Getting at least 3 servings of Calcium per day:  NO    Bi-annual eye exam:  Yes    Dental care twice a year:  Yes    Sleep apnea or symptoms of sleep apnea:  Daytime drowsiness    Diet:  Regular (no restrictions)    Frequency of exercise:  None    Taking medications regularly:  Yes    Medication side effects:  None    Additional concerns today:  YES    She notes her nutrition is pretty good; she goes through the drive through maybe one every other week. She doesn't exercise much outside of work, other than housework, as she walks a lot at work. She notes that she has gained weight which is effecting her knees. She relates that she needs to do a \"sugar detox.\"    She is still taking her trazodone. She is seeing her psychiatrist for refills. She notes that her mood is good, but her stress is exacerbated. She notes that it feels she just doesn't have a break between work and then home life.     She reports menstrual periods every other month. She notes that she and her long term partner don't have much sexual intercourse, but if they do they use a male latex condom. She reports that her sex drive is decreased. She states that \"you could give me Luke [place] and I'd be like 'eh.' \" She  Notes that she has been getting acupuncture which has been a way to relax and meditate for her as she cannot move.     She denies any bowel or urinary issues, any concern for STDs, vaginal itching or concerns.       Today's PHQ-2 Score:   PHQ-2 ( 1999 Pfizer) 9/21/2018   Q1: Little interest or pleasure in doing things 1   Q2: Feeling down, depressed or hopeless 0   PHQ-2 Score 1   Q1: Little interest or pleasure in doing things Several days   Q2: Feeling down, depressed or hopeless Not at all   PHQ-2 Score 1     Abuse: Current or Past(Physical, Sexual or Emotional)- No  Do you feel safe in your environment - " Yes    Social History   Substance Use Topics     Smoking status: Former Smoker     Years: 15.00     Types: Cigarettes     Smokeless tobacco: Never Used      Comment: quit date 8/2010      Alcohol use Yes      Comment: rarely     Alcohol Use 9/21/2018   If you drink alcohol do you typically have greater than 3 drinks per day OR greater than 7 drinks per week? No   No flowsheet data found.    Reviewed orders with patient.  Reviewed health maintenance and updated orders accordingly - Yes  BP Readings from Last 3 Encounters:   09/21/18 110/62   07/03/18 108/64   06/25/18 100/64    Wt Readings from Last 3 Encounters:   09/21/18 135 lb (61.2 kg)   07/03/18 136 lb 12.8 oz (62.1 kg)   06/25/18 137 lb (62.1 kg)        Patient Active Problem List   Diagnosis     Disturbance in sleep behavior     Urinary frequency     Papanicolaou smear of cervix with low grade squamous intraepithelial lesion (LGSIL)     Other abnormal Papanicolaou smear of cervix and cervical HPV(795.09)     CARDIOVASCULAR SCREENING; LDL GOAL LESS THAN 160     Generalized anxiety disorder     Moderate major depression (H)     Anal fissure     Acquired subluxation of left patella, subsequent encounter     Past Surgical History:   Procedure Laterality Date     ARTHROSCOPY KNEE RT/LT Left 9/8/16     AS CLOSED TX NASAL BONE FRACTURE W/ STABILIZATION  3/1/16     EXAM OF VAGINA,COLPOSCOPY  2006, 2005     EYE SURGERY  2008    Lasik eye surgery       Social History   Substance Use Topics     Smoking status: Former Smoker     Years: 15.00     Types: Cigarettes     Smokeless tobacco: Never Used      Comment: quit date 8/2010      Alcohol use Yes      Comment: rarely     Family History   Problem Relation Age of Onset     Hypertension Mother      Unknown/Adopted Father      Family History Negative Other          Current Outpatient Prescriptions   Medication Sig Dispense Refill     busPIRone (BUSPAR) 15 MG tablet TAKE 1 TABLET TWICE A  tablet 1     Calcium  Carbonate-Vitamin D (CALCIUM + D PO) Take  by mouth.       cetirizine HCl (ZYRTEC) 10 MG CHEW Take by mouth daily Reported on 5/8/2017       desonide (DESOWEN) 0.05 % cream Reported on 5/8/2017       DimenhyDRINATE (DRAMAMINE PO) Take  by mouth. As needed for dizziness       Ferrous Sulfate (SLOW FE PO) Reported on 5/8/2017       fluticasone (FLONASE) 50 MCG/ACT spray Spray 1-2 sprays into both nostrils daily 1 Bottle 0     Melatonin 10 MG TABS tablet Take 10 mg by mouth nightly as needed for sleep       Prenatal Vit-Fe Sulfate-FA (PRENATAL MULTIVIT-IRON PO) Reported on 5/8/2017       PROAIR  (90 Base) MCG/ACT inhaler USE 2 INHALATIONS EVERY 6 HOURS 17 g 0     traZODone (DESYREL) 50 MG tablet TAKE ONE-HALF (1/2) TABLET NIGHTLY AS NEEDED FOR SLEEP 90 tablet 0     venlafaxine (EFFEXOR-XR) 75 MG 24 hr capsule Take 75 mg by mouth 3 times daily       predniSONE (DELTASONE) 20 MG tablet Take 1 tablet (20 mg) by mouth daily (Patient not taking: Reported on 7/3/2018) 5 tablet 0     Allergies   Allergen Reactions     Seasonal Allergies      Recent Labs   Lab Test  10/31/17   0932  09/01/16   1120  06/18/15   0922   05/05/11   1830  02/08/11   1628   LDL  90   --   73   --    --    --    HDL  56   --   55   --    --    --    TRIG  118   --   75   --    --    --    CR   --    --    --    --   0.48*  0.64   GFRESTIMATED   --    --    --    --   >90  >90   GFRESTBLACK   --    --    --    --   >90  >90   POTASSIUM   --    --    --    --   3.9  3.5   TSH   --   1.21  1.47   < >   --   2.05    < > = values in this interval not displayed.      Mammogram not appropriate for this patient based on age. Pertinent mammograms are reviewed under the imaging tab.    History of abnormal Pap smear:   NO - age 30-65 PAP every 5 years with negative HPV co-testing recommended  Last 3 Pap and HPV Results:   PAP / HPV Latest Ref Rng & Units 6/18/2015 10/9/2012 6/16/2010   PAP - NIL NIL NIL   HPV 16 DNA NEG Negative - -   HPV 18 DNA NEG  Negative - -   OTHER HR HPV NEG Negative - -     PAP / HPV Latest Ref Rng & Units 2015 10/9/2012 2010   PAP - NIL NIL NIL   HPV 16 DNA NEG Negative - -   HPV 18 DNA NEG Negative - -   OTHER HR HPV NEG Negative - -     Reviewed and updated as needed this visit by clinical staff  Tobacco  Allergies  Meds  Med Hx  Surg Hx  Fam Hx  Soc Hx      Reviewed and updated as needed this visit by Provider        Past Medical History:   Diagnosis Date     Anxiety      ASCUS with positive high risk HPV 05, 06    + high risk HPV (53)     Attention deficit disorder with hyperactivity(314.01)      Depression      H/O colposcopy with cervical biopsy 05    ECC- within normal limits. no dysplasia     H/O colposcopy with cervical biopsy 06    biopsies and ECC- within normal limits. no dysplasia noted.     Infectious mononucleosis      LSIL (low grade squamous intraepithelial lesion) on Pap smear 05    LDSIL      Past Surgical History:   Procedure Laterality Date     ARTHROSCOPY KNEE RT/LT Left 16     AS CLOSED TX NASAL BONE FRACTURE W/ STABILIZATION  3/1/16     EXAM OF VAGINA,COLPOSCOPY  ,      EYE SURGERY      Lasik eye surgery     Obstetric History       T0      L0     SAB3   TAB0   Ectopic0   Multiple0   Live Births0       # Outcome Date GA Lbr Jimi/2nd Weight Sex Delivery Anes PTL Lv   4             3 SAB      SAB   DEC   2 SAB      SAB   DEC   1 SAB      SAB   DEC          Review of Systems  CONSTITUTIONAL: NEGATIVE for fever, chills, change in weight  INTEGUMENTARU/SKIN: NEGATIVE for worrisome rashes, moles or lesions  EYES: NEGATIVE for vision changes or irritation  ENT: NEGATIVE for ear, mouth and throat problems  RESP: NEGATIVE for significant cough or SOB  BREAST: NEGATIVE for masses, tenderness or discharge  CV: NEGATIVE for chest pain, palpitations or peripheral edema  GI: NEGATIVE for nausea, abdominal pain, heartburn, or change in bowel  "habits  : NEGATIVE for unusual urinary or vaginal symptoms. Periods are regular.  MUSCULOSKELETAL: NEGATIVE for significant arthralgias or myalgia  NEURO: NEGATIVE for weakness, dizziness or paresthesias  PSYCHIATRIC: NEGATIVE for changes in mood or affect    This document serves as a record of the services and decisions personally performed and made by Nicole Soto CNP. It was created on his/her behalf by Jossie Bull, trained medical scribe. The creation of this document is based the provider's statements to the medical scribes.    Scribalea Bull 8:35 AM, September 21, 2018   OBJECTIVE:   /62  Pulse 78  Temp 98.4  F (36.9  C) (Temporal)  Resp 18  Ht 5' 2.6\" (1.59 m)  Wt 135 lb (61.2 kg)  SpO2 97%  BMI 24.22 kg/m2     Physical Exam  GENERAL: healthy, alert and no distress  EYES: Eyes grossly normal to inspection, PERRL and conjunctivae and sclerae normal  HENT: ear canals and TM's normal, nose and mouth without ulcers or lesions  NECK: no adenopathy, no asymmetry, masses, or scars and thyroid normal to palpation  RESP: lungs clear to auscultation - no rales, rhonchi or wheezes  BREAST: normal without masses, tenderness or nipple discharge and no palpable axillary masses or adenopathy  CV: regular rate and rhythm, normal S1 S2, no S3 or S4, no murmur, click or rub, no peripheral edema and peripheral pulses strong  ABDOMEN: soft, nontender, no hepatosplenomegaly, no masses and bowel sounds normal   (female): normal female external genitalia, normal urethral meatus,  and normal cervix/adnexa/uterus without masses   MS: no gross musculoskeletal defects noted, no edema  SKIN: no suspicious lesions or rashes  NEURO: Normal strength and tone, mentation intact and speech normal  PSYCH: mentation appears normal, affect normal/bright    Diagnostic Test Results:  Results for orders placed or performed in visit on 09/21/18 (from the past 24 hour(s))   Hemoglobin   Result Value Ref Range    Hemoglobin " "11.7 11.7 - 15.7 g/dL       ASSESSMENT/PLAN:       ICD-10-CM    1. Encounter for routine adult health examination with abnormal findings Z00.01 Nicotine and Cotinine Blood     TSH with free T4 reflex     Glucose     Lipid panel reflex to direct LDL Fasting     Hemoglobin   2. Generalized anxiety disorder F41.1    3. Moderate major depression (H) F32.1       Physical exam completed today. Screening labs updated with nicotine and cotinine blood lab placed today; will notify with results.     Discussed healhty diet and exercise for mood and weight management at length with patient.    Patient is followed by psychiatry for her depression and anxiety medications. Reports good control. Slightly decreased sex drive, but no other side effects reported.     Patient declined influenza vaccination at this time as she is receiving it through her employer.    Forms filled out.     Follow up with PCP for annual physical exam or prn    COUNSELING:  Reviewed preventive health counseling, as reflected in patient instructions       Regular exercise       Healthy diet/nutrition       Immunizations    Declined: Influenza due to Other: receiving at work.            Contraception       Safe sex practices/STD prevention       HIV screeninx in teen years, 1x in adult years, and at intervals if high risk       One time pneumovax for smokers    BP Readings from Last 1 Encounters:   18 110/62     Estimated body mass index is 24.22 kg/(m^2) as calculated from the following:    Height as of this encounter: 5' 2.6\" (1.59 m).    Weight as of this encounter: 135 lb (61.2 kg).           reports that she has quit smoking. Her smoking use included Cigarettes. She quit after 15.00 years of use. She has never used smokeless tobacco.      Counseling Resources:  ATP IV Guidelines  Pooled Cohorts Equation Calculator  Breast Cancer Risk Calculator  FRAX Risk Assessment  ICSI Preventive Guidelines  Dietary Guidelines for Americans, 2010  USDA's " MyPlate  ASA Prophylaxis  Lung CA Screening    The information in this document, created by the medical scribe for me, accurately reflects the services I personally performed and the decisions made by me. I have reviewed and approved this document for accuracy prior to leaving the patient care area.  Nicole Soto CNP  8:35 AM, 09/21/18    MANJULA Guerrero CNP  [facility]

## 2018-09-21 ENCOUNTER — TELEPHONE (OUTPATIENT)
Dept: FAMILY MEDICINE | Facility: CLINIC | Age: 38
End: 2018-09-21

## 2018-09-21 ENCOUNTER — OFFICE VISIT (OUTPATIENT)
Dept: FAMILY MEDICINE | Facility: CLINIC | Age: 38
End: 2018-09-21
Payer: COMMERCIAL

## 2018-09-21 VITALS
HEART RATE: 78 BPM | BODY MASS INDEX: 23.92 KG/M2 | HEIGHT: 63 IN | WEIGHT: 135 LBS | RESPIRATION RATE: 18 BRPM | OXYGEN SATURATION: 97 % | TEMPERATURE: 98.4 F | DIASTOLIC BLOOD PRESSURE: 62 MMHG | SYSTOLIC BLOOD PRESSURE: 110 MMHG

## 2018-09-21 DIAGNOSIS — F32.1 MODERATE MAJOR DEPRESSION (H): ICD-10-CM

## 2018-09-21 DIAGNOSIS — F41.1 GENERALIZED ANXIETY DISORDER: ICD-10-CM

## 2018-09-21 DIAGNOSIS — Z00.01 ENCOUNTER FOR ROUTINE ADULT HEALTH EXAMINATION WITH ABNORMAL FINDINGS: Primary | ICD-10-CM

## 2018-09-21 LAB
CHOLEST SERPL-MCNC: 147 MG/DL
GLUCOSE SERPL-MCNC: 94 MG/DL (ref 70–99)
HDLC SERPL-MCNC: 53 MG/DL
HGB BLD-MCNC: 11.7 G/DL (ref 11.7–15.7)
LDLC SERPL CALC-MCNC: 81 MG/DL
NONHDLC SERPL-MCNC: 94 MG/DL
TRIGL SERPL-MCNC: 63 MG/DL
TSH SERPL DL<=0.005 MIU/L-ACNC: 1.43 MU/L (ref 0.4–4)

## 2018-09-21 PROCEDURE — 85018 HEMOGLOBIN: CPT | Performed by: NURSE PRACTITIONER

## 2018-09-21 PROCEDURE — 99000 SPECIMEN HANDLING OFFICE-LAB: CPT | Performed by: NURSE PRACTITIONER

## 2018-09-21 PROCEDURE — 84443 ASSAY THYROID STIM HORMONE: CPT | Performed by: NURSE PRACTITIONER

## 2018-09-21 PROCEDURE — 80323 ALKALOIDS NOS: CPT | Mod: 90 | Performed by: NURSE PRACTITIONER

## 2018-09-21 PROCEDURE — 80061 LIPID PANEL: CPT | Performed by: NURSE PRACTITIONER

## 2018-09-21 PROCEDURE — 99395 PREV VISIT EST AGE 18-39: CPT | Performed by: NURSE PRACTITIONER

## 2018-09-21 PROCEDURE — 36415 COLL VENOUS BLD VENIPUNCTURE: CPT | Performed by: NURSE PRACTITIONER

## 2018-09-21 PROCEDURE — 82947 ASSAY GLUCOSE BLOOD QUANT: CPT | Performed by: NURSE PRACTITIONER

## 2018-09-21 RX ORDER — PHENOL 1.4 %
10 AEROSOL, SPRAY (ML) MUCOUS MEMBRANE
COMMUNITY
End: 2020-10-08

## 2018-09-21 ASSESSMENT — PATIENT HEALTH QUESTIONNAIRE - PHQ9
SUM OF ALL RESPONSES TO PHQ QUESTIONS 1-9: 6
SUM OF ALL RESPONSES TO PHQ QUESTIONS 1-9: 6
10. IF YOU CHECKED OFF ANY PROBLEMS, HOW DIFFICULT HAVE THESE PROBLEMS MADE IT FOR YOU TO DO YOUR WORK, TAKE CARE OF THINGS AT HOME, OR GET ALONG WITH OTHER PEOPLE: SOMEWHAT DIFFICULT

## 2018-09-21 ASSESSMENT — PAIN SCALES - GENERAL: PAINLEVEL: NO PAIN (0)

## 2018-09-21 ASSESSMENT — ANXIETY QUESTIONNAIRES
1. FEELING NERVOUS, ANXIOUS, OR ON EDGE: MORE THAN HALF THE DAYS
3. WORRYING TOO MUCH ABOUT DIFFERENT THINGS: NOT AT ALL
5. BEING SO RESTLESS THAT IT IS HARD TO SIT STILL: NOT AT ALL
GAD7 TOTAL SCORE: 5
GAD7 TOTAL SCORE: 5
2. NOT BEING ABLE TO STOP OR CONTROL WORRYING: NOT AT ALL
7. FEELING AFRAID AS IF SOMETHING AWFUL MIGHT HAPPEN: NOT AT ALL
GAD7 TOTAL SCORE: 5
7. FEELING AFRAID AS IF SOMETHING AWFUL MIGHT HAPPEN: NOT AT ALL
4. TROUBLE RELAXING: SEVERAL DAYS
6. BECOMING EASILY ANNOYED OR IRRITABLE: MORE THAN HALF THE DAYS

## 2018-09-21 NOTE — MR AVS SNAPSHOT
After Visit Summary   9/21/2018    Barbara Yates    MRN: [id number]           Patient Information     Date Of Birth          1980        Visit Information        Provider Department      9/21/2018 8:20 AM Nicole Soto APRN [facility]        Today's Diagnoses     Encounter for routine adult health examination with abnormal findings    -  1    Generalized anxiety disorder        Moderate major depression (H)          Care Instructions      Preventive Health Recommendations  Female Ages 26 - 39  Yearly exam:   See your health care provider every year in order to    Review health changes.     Discuss preventive care.      Review your medicines if you your doctor has prescribed any.    Until age 30: Get a Pap test every three years (more often if you have had an abnormal result).    After age 30: Talk to your doctor about whether you should have a Pap test every 3 years or have a Pap test with HPV screening every 5 years.   You do not need a Pap test if your uterus was removed (hysterectomy) and you have not had cancer.  You should be tested each year for STDs (sexually transmitted diseases), if you're at risk.   Talk to your provider about how often to have your cholesterol checked.  If you are at risk for diabetes, you should have a diabetes test (fasting glucose).  Shots: Get a flu shot each year. Get a tetanus shot every 10 years.   Nutrition:     Eat at least 5 servings of fruits and vegetables each day.    Eat whole-grain bread, whole-wheat pasta and brown rice instead of white grains and rice.    Get adequate Calcium and Vitamin D.     Lifestyle    Exercise at least 150 minutes a week (30 minutes a day, 5 days of the week). This will help you control your weight and prevent disease.    Limit alcohol to one drink per day.    No smoking.     Wear sunscreen to prevent skin cancer.    See your dentist every six months for an exam and cleaning.            Follow-ups after  "your visit        Who to contact     If you have questions or need follow up information about today's clinic visit or your schedule please contact [facility] directly at [phone].  Normal or non-critical lab and imaging results will be communicated to you by MyChart, letter or phone within 4 business days after the clinic has received the results. If you do not hear from us within 7 days, please contact the clinic through MyChart or phone. If you have a critical or abnormal lab result, we will notify you by phone as soon as possible.  Submit refill requests through [employer] or call your pharmacy and they will forward the refill request to us. Please allow 3 business days for your refill to be completed.          Additional Information About Your Visit        [employer] Information     [employer] gives you secure access to your electronic health record. If you see a primary care provider, you can also send messages to your care team and make appointments. If you have questions, please call your primary care clinic.  If you do not have a primary care provider, please call [phone] and they will assist you.        Care EveryWhere ID     This is your Care EveryWhere ID. This could be used by other organizations to access your [facility] medical records  [id number]        Your Vitals Were     Pulse Temperature Respirations Height Pulse Oximetry BMI (Body Mass Index)    78 98.4  F (36.9  C) (Temporal) 18 5' 2.6\" (1.59 m) 97% 24.22 kg/m2       Blood Pressure from Last 3 Encounters:   09/21/18 110/62   07/03/18 108/64   06/25/18 100/64    Weight from Last 3 Encounters:   09/21/18 135 lb (61.2 kg)   07/03/18 136 lb 12.8 oz (62.1 kg)   06/25/18 137 lb (62.1 kg)              We Performed the Following     Glucose     Hemoglobin     Lipid panel reflex to direct LDL Fasting     Nicotine and Cotinine Blood     TSH with free T4 reflex        Primary Care Provider Office Phone # Fax #    MANJULA Payne CNP " [phone]       [address] [facility]  [place] [address]        Equal Access to Services     TERRENCE HARKINS : Hadii alex valera audrey Hutchinson, wamyrada luqleo, qatanvirta kaalmada yuridia, jaquan clinton laChristopherbhavna navarro. So [facility] [phone].    ATENCIÓN: Si habla español, tiene a coles disposición servicios gratuitos de asistencia lingüística. Llame al [phone].    We comply with applicable federal civil rights laws and [place] laws. We do not discriminate on the basis of race, color, national origin, age, disability, sex, sexual orientation, or gender identity.            Thank you!     Thank you for choosing [facility]  for your care. Our goal is always to provide you with excellent care. Hearing back from our patients is one way we can continue to improve our services. Please take a few minutes to complete the written survey that you may receive in the mail after your visit with us. Thank you!             Your Updated Medication List - Protect others around you: Learn how to safely use, store and throw away your medicines at www.disposemymeds.org.          This list is accurate as of 9/21/18  8:47 AM.  Always use your most recent med list.                   Brand Name Dispense Instructions for use Diagnosis    busPIRone 15 MG tablet    BUSPAR    180 tablet    TAKE 1 TABLET TWICE A DAY    Generalized anxiety disorder       CALCIUM + D PO      Take  by mouth.        desonide 0.05 % cream    DESOWEN     Reported on 5/8/2017        DRAMAMINE PO      Take  by mouth. As needed for dizziness        fluticasone 50 MCG/ACT spray    FLONASE    1 Bottle    Spray 1-2 sprays into both nostrils daily    Right acute serous otitis media, recurrence not specified, Dysfunction of right eustachian tube       Melatonin 10 MG Tabs tablet      Take 10 mg by mouth nightly as needed for sleep        predniSONE 20 MG tablet    DELTASONE    5 tablet    Take 1 tablet (20 mg) by mouth daily    Right acute  serous otitis media, recurrence not specified, Dysfunction of right eustachian tube       PRENATAL MULTIVIT-IRON PO      Reported on 5/8/2017        PROAIR  (90 Base) MCG/ACT inhaler   Generic drug:  albuterol     17 g    USE 2 INHALATIONS EVERY 6 HOURS    Cough       SLOW FE PO      Reported on 5/8/2017        traZODone 50 MG tablet    DESYREL    90 tablet    TAKE ONE-HALF (1/2) TABLET NIGHTLY AS NEEDED FOR SLEEP    Disturbance in sleep behavior       venlafaxine 75 MG 24 hr capsule    EFFEXOR-XR     Take 75 mg by mouth 3 times daily        zyrTEC 10 MG Chew   Generic drug:  cetirizine      Take by mouth daily Reported on 5/8/2017

## 2018-09-21 NOTE — TELEPHONE ENCOUNTER
Reason for call:  Form  Reason for Call:  Form, our goal is to have forms completed with 72 hours, however, some forms may require a visit or additional information.    Type of letter, form or note:  medical    Who is the form from?:  [employer] - biomettric screening form    Where did the form come from: form was brought in    What clinic location was the form placed at?: [facility] - [phone]    Where the form was placed:  completed by DOLORES at [facility].  In TC in-basket for lab results to be finalized and entered onto form.  Then fax.    What number is listed as a contact on the form?:   [phone]       Additional comments:  Fax to [phone]    created by Charisma Jennings

## 2018-09-22 ASSESSMENT — ANXIETY QUESTIONNAIRES: GAD7 TOTAL SCORE: 5

## 2018-09-22 ASSESSMENT — PATIENT HEALTH QUESTIONNAIRE - PHQ9: SUM OF ALL RESPONSES TO PHQ QUESTIONS 1-9: 6

## 2018-09-25 LAB
COTININE SERPL-MCNC: NORMAL NG/ML
NICOTINE SERPL-MCNC: NORMAL NG/ML

## 2018-11-12 ENCOUNTER — TRANSFERRED RECORDS (OUTPATIENT)
Dept: HEALTH INFORMATION MANAGEMENT | Facility: CLINIC | Age: 38
End: 2018-11-12

## 2018-11-15 LAB — PHQ9 SCORE: 9

## 2019-04-24 ENCOUNTER — OFFICE VISIT (OUTPATIENT)
Dept: URGENT CARE | Facility: URGENT CARE | Age: 39
End: 2019-04-24
Payer: COMMERCIAL

## 2019-04-24 VITALS
RESPIRATION RATE: 16 BRPM | OXYGEN SATURATION: 100 % | SYSTOLIC BLOOD PRESSURE: 106 MMHG | HEART RATE: 100 BPM | DIASTOLIC BLOOD PRESSURE: 68 MMHG | TEMPERATURE: 98.6 F | WEIGHT: 138.1 LBS | BODY MASS INDEX: 24.78 KG/M2

## 2019-04-24 DIAGNOSIS — J10.1 INFLUENZA B: ICD-10-CM

## 2019-04-24 DIAGNOSIS — R05.9 COUGH: ICD-10-CM

## 2019-04-24 DIAGNOSIS — R68.83 CHILLS: Primary | ICD-10-CM

## 2019-04-24 DIAGNOSIS — R07.0 THROAT PAIN: ICD-10-CM

## 2019-04-24 DIAGNOSIS — R52 BODY ACHES: ICD-10-CM

## 2019-04-24 DIAGNOSIS — R68.83 CHILLS (WITHOUT FEVER): ICD-10-CM

## 2019-04-24 LAB
DEPRECATED S PYO AG THROAT QL EIA: NORMAL
FLUAV+FLUBV AG SPEC QL: NEGATIVE
FLUAV+FLUBV AG SPEC QL: POSITIVE
SPECIMEN SOURCE: ABNORMAL
SPECIMEN SOURCE: NORMAL

## 2019-04-24 PROCEDURE — 99214 OFFICE O/P EST MOD 30 MIN: CPT | Performed by: PHYSICIAN ASSISTANT

## 2019-04-24 PROCEDURE — 87081 CULTURE SCREEN ONLY: CPT | Performed by: PHYSICIAN ASSISTANT

## 2019-04-24 PROCEDURE — 87880 STREP A ASSAY W/OPTIC: CPT | Performed by: PHYSICIAN ASSISTANT

## 2019-04-24 PROCEDURE — 87804 INFLUENZA ASSAY W/OPTIC: CPT | Performed by: PHYSICIAN ASSISTANT

## 2019-04-24 RX ORDER — IBUPROFEN 600 MG/1
600 TABLET, FILM COATED ORAL EVERY 6 HOURS PRN
Qty: 30 TABLET | Refills: 0 | Status: SHIPPED | OUTPATIENT
Start: 2019-04-24 | End: 2020-05-05

## 2019-04-24 NOTE — PROGRESS NOTES
SUBJECTIVE:   Barbara Yates is a 38 year old female presenting with a chief complaint of chills, runny nose, stuffy nose, cough - non-productive and body aches.  Onset of symptoms was 4 day(s) ago.  Course of illness is same.    Severity moderate  Current and Associated symptoms: runny nose, stuffy nose, cough - non-productive, sore throat and body aches  Treatment measures tried include Tylenol/Ibuprofen and OTC Cough med.  Predisposing factors include exposure to influenza.    Past Medical History:   Diagnosis Date     Anxiety      ASCUS with positive high risk HPV 11/28/05, 6/14/06    + high risk HPV (53)     Attention deficit disorder with hyperactivity(314.01)      Depression      H/O colposcopy with cervical biopsy 6/7/05    ECC- within normal limits. no dysplasia     H/O colposcopy with cervical biopsy 7/13/06    biopsies and ECC- within normal limits. no dysplasia noted.     Infectious mononucleosis 2004     LSIL (low grade squamous intraepithelial lesion) on Pap smear 5/6/05    LDSIL        Allergies   Allergen Reactions     Seasonal Allergies          Social History     Tobacco Use     Smoking status: Former Smoker     Years: 15.00     Types: Cigarettes     Smokeless tobacco: Never Used     Tobacco comment: quit date 8/2010    Substance Use Topics     Alcohol use: Yes     Comment: rarely     Family History   Problem Relation Age of Onset     Hypertension Mother      Unknown/Adopted Father      Family History Negative Other        ROS:  CONSTITUTIONAL:NEGATIVE for fever, chills, change in weight  INTEGUMENTARY/SKIN: NEGATIVE for worrisome rashes, moles or lesions  EYES: NEGATIVE for vision changes or irritation  ENT/MOUTH: POSITIVE for nasal congestion, throat pain  RESP:POSITIVE for cough-non productive  CV: NEGATIVE for chest pain, palpitations or peripheral edema  GI: NEGATIVE for nausea, abdominal pain, heartburn, or change in bowel habits  : negative for and dysuria  MUSCULOSKELETAL: POSITIVE   for body aches  NEURO: NEGATIVE for weakness, dizziness or paresthesias    OBJECTIVE  :/68   Pulse 100   Temp 98.6  F (37  C) (Oral)   Resp 16   Wt 62.6 kg (138 lb 1.6 oz)   SpO2 100%   BMI 24.78 kg/m    GENERAL APPEARANCE: healthy, alert and no distress  EYES: EOMI,  PERRL, conjunctiva clear  HENT: TM's normal bilaterally and rhinorrhea clear  NECK: supple, nontender, no lymphadenopathy  RESP: lungs clear to auscultation - no rales, rhonchi or wheezes  CV: regular rates and rhythm, normal S1 S2, no murmur noted  ABDOMEN:  soft, nontender, no HSM or masses and bowel sounds normal  Extremities: no peripheral edema or tenderness, peripheral pulses normal  MS: extremities normal- no gross deformities noted, no erythema, FROM noted in all extremities  NEURO: Normal strength and tone, sensory exam grossly normal,  normal speech and mentation  SKIN: no suspicious lesions or rashes    Results for orders placed or performed in visit on 04/24/19   Rapid strep screen   Result Value Ref Range    Specimen Description Throat     Rapid Strep A Screen       NEGATIVE: No Group A streptococcal antigen detected by immunoassay, await culture report.   Influenza A/B antigen   Result Value Ref Range    Influenza A/B Agn Specimen Nasopharyngeal     Influenza A Negative NEG^Negative    Influenza B Positive (A) NEG^Negative       ASSESSMENT/PLAN      ICD-10-CM    1. Chills R68.83 Rapid strep screen     Influenza A/B antigen     Beta strep group A culture     ibuprofen (ADVIL/MOTRIN) 600 MG tablet   2. Throat pain R07.0 ibuprofen (ADVIL/MOTRIN) 600 MG tablet     magic mouthwash (ENTER INGREDIENTS IN COMMENTS) suspension   3. Body aches R52 ibuprofen (ADVIL/MOTRIN) 600 MG tablet   4. Cough R05    5. Chills (without fever) R68.83    6. Influenza B J10.1        Orders Placed This Encounter     ibuprofen (ADVIL/MOTRIN) 600 MG tablet     magic mouthwash (ENTER INGREDIENTS IN COMMENTS) suspension       Strep culture pending    Patient  given information about influenza.  Patient understands they are contagious until their fever has resolved without the use of motrin or tylenol.  At that time they can return to school/work.  Patient is to monitor for any worsening symptoms and return to the clinic if this occurs.  The most common complication of influenza is Pneumonia or other respiratory problems especially in those with underlying lung problems including asthma and COPD.  Patient will follow up if this occurs.    Follow up as needed  See orders in Epic

## 2019-04-24 NOTE — PATIENT INSTRUCTIONS
Patient Education     Influenza (Adult)    Influenza is also called the flu. It is a viral illness that affects the air passages of your lungs. It is different from the common cold. The flu can easily be passed from one to person to another. It may be spread through the air by coughing and sneezing. Or it can be spread by touching the sick person and then touching your own eyes, nose, or mouth.  The flu starts 1 to 3 days after you are exposed to the flu virus. It may last for 1 to 2 weeks but many people feel tired or fatigued for many weeks afterward. You usually don t need to take antibiotics unless you have a complication. This might be an ear or sinus infection or pneumonia.  Symptoms of the flu may be mild or severe. They can include extreme tiredness (wanting to stay in bed all day), chills, fevers, muscle aches, soreness with eye movement, headache, and a dry, hacking cough.  Home care  Follow these guidelines when caring for yourself at home:    Avoid being around cigarette smoke, whether yours or other people s.    Acetaminophen or ibuprofen will help ease your fever, muscle aches, and headache. Don t give aspirin to anyone younger than 18 who has the flu. Aspirin can harm the liver.    Nausea and loss of appetite are common with the flu. Eat light meals. Drink 6 to 8 glasses of liquids every day. Good choices are water, sport drinks, soft drinks without caffeine, juices, tea, and soup. Extra fluids will also help loosen secretions in your nose and lungs.    Over-the-counter cold medicines will not make the flu go away faster. But the medicines may help with coughing, sore throat, and congestion in your nose and sinuses. Don t use a decongestant if you have high blood pressure.    Stay home until your fever has been gone for at least 24 hours without using medicine to reduce fever.  Follow-up care  Follow up with your healthcare provider, or as advised, if you are not getting better over the next week.   If you are age 65 or older, talk with your provider about getting a pneumococcal vaccine every 5 years. You should also get this vaccine if you have chronic asthma or COPD. All adults should get a flu vaccine every fall. Ask your provider about this.  When to seek medical advice  Call your healthcare provider right away if any of these occur:    Cough with lots of colored mucus (sputum) or blood in your mucus    Chest pain, shortness of breath, wheezing, or trouble breathing    Severe headache, or face, neck, or ear pain    New rash with fever    Fever of 100.4 F (38 C) or higher, or as directed by your healthcare provider    Confusion, behavior change, or seizure    Severe weakness or dizziness    You get a new fever or cough after getting better for a few days  Date Last Reviewed: 1/1/2017 2000-2018 [employer]. [address] [place] [address], [place] [address]. All rights reserved. This information is not intended as a substitute for professional medical care. Always follow your healthcare professional's instructions.

## 2019-04-25 LAB
BACTERIA SPEC CULT: NORMAL
SPECIMEN SOURCE: NORMAL

## 2019-06-12 ENCOUNTER — TRANSFERRED RECORDS (OUTPATIENT)
Dept: HEALTH INFORMATION MANAGEMENT | Facility: CLINIC | Age: 39
End: 2019-06-12
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
## 2019-09-27 ENCOUNTER — OFFICE VISIT (OUTPATIENT)
Dept: FAMILY MEDICINE | Facility: CLINIC | Age: 39
End: 2019-09-27
Payer: COMMERCIAL

## 2019-09-27 VITALS
OXYGEN SATURATION: 99 % | WEIGHT: 141 LBS | BODY MASS INDEX: 24.98 KG/M2 | SYSTOLIC BLOOD PRESSURE: 90 MMHG | HEIGHT: 63 IN | DIASTOLIC BLOOD PRESSURE: 60 MMHG | HEART RATE: 93 BPM | TEMPERATURE: 98.3 F

## 2019-09-27 DIAGNOSIS — Z00.00 ROUTINE GENERAL MEDICAL EXAMINATION AT A HEALTH CARE FACILITY: Primary | ICD-10-CM

## 2019-09-27 DIAGNOSIS — F32.1 MODERATE MAJOR DEPRESSION (H): ICD-10-CM

## 2019-09-27 DIAGNOSIS — G47.9 DISTURBANCE IN SLEEP BEHAVIOR: ICD-10-CM

## 2019-09-27 DIAGNOSIS — Z20.828 EXPOSURE TO INFLUENZA: ICD-10-CM

## 2019-09-27 DIAGNOSIS — F41.1 GENERALIZED ANXIETY DISORDER: ICD-10-CM

## 2019-09-27 PROCEDURE — 99214 OFFICE O/P EST MOD 30 MIN: CPT | Mod: 25 | Performed by: NURSE PRACTITIONER

## 2019-09-27 PROCEDURE — 90686 IIV4 VACC NO PRSV 0.5 ML IM: CPT | Performed by: NURSE PRACTITIONER

## 2019-09-27 PROCEDURE — 90471 IMMUNIZATION ADMIN: CPT | Performed by: NURSE PRACTITIONER

## 2019-09-27 PROCEDURE — 99395 PREV VISIT EST AGE 18-39: CPT | Mod: 25 | Performed by: NURSE PRACTITIONER

## 2019-09-27 RX ORDER — OSELTAMIVIR PHOSPHATE 75 MG/1
75 CAPSULE ORAL DAILY
Qty: 10 CAPSULE | Refills: 0 | Status: SHIPPED | OUTPATIENT
Start: 2019-09-27 | End: 2019-12-23

## 2019-09-27 RX ORDER — VENLAFAXINE HYDROCHLORIDE 75 MG/1
75 CAPSULE, EXTENDED RELEASE ORAL DAILY
Qty: 90 CAPSULE | Refills: 1 | Status: SHIPPED | OUTPATIENT
Start: 2019-09-27 | End: 2019-12-23

## 2019-09-27 RX ORDER — BUSPIRONE HYDROCHLORIDE 15 MG/1
15 TABLET ORAL DAILY
Qty: 90 TABLET | Refills: 1 | Status: SHIPPED | OUTPATIENT
Start: 2019-09-27 | End: 2021-09-20

## 2019-09-27 RX ORDER — VENLAFAXINE HYDROCHLORIDE 150 MG/1
150 CAPSULE, EXTENDED RELEASE ORAL
COMMUNITY
Start: 2019-08-21 | End: 2019-09-27

## 2019-09-27 RX ORDER — VENLAFAXINE HYDROCHLORIDE 150 MG/1
150 CAPSULE, EXTENDED RELEASE ORAL DAILY
Qty: 90 CAPSULE | Refills: 1 | Status: SHIPPED | OUTPATIENT
Start: 2019-09-27 | End: 2020-01-28

## 2019-09-27 RX ORDER — TRAZODONE HYDROCHLORIDE 50 MG/1
TABLET, FILM COATED ORAL
Qty: 45 TABLET | Refills: 1 | Status: SHIPPED | OUTPATIENT
Start: 2019-09-27 | End: 2020-10-08

## 2019-09-27 ASSESSMENT — ENCOUNTER SYMPTOMS
NAUSEA: 0
HEADACHES: 0
NERVOUS/ANXIOUS: 0
CONSTIPATION: 0
PALPITATIONS: 0
ABDOMINAL PAIN: 0
ARTHRALGIAS: 0
FEVER: 0
HEMATOCHEZIA: 0
FREQUENCY: 0
CHILLS: 0
HEARTBURN: 0
BREAST MASS: 0
HEMATURIA: 0
DYSURIA: 0
WEAKNESS: 0
DIZZINESS: 0
MYALGIAS: 0
PARESTHESIAS: 0
DIARRHEA: 0
EYE PAIN: 0
JOINT SWELLING: 0
SORE THROAT: 0
COUGH: 0
SHORTNESS OF BREATH: 0

## 2019-09-27 ASSESSMENT — PAIN SCALES - GENERAL: PAINLEVEL: MODERATE PAIN (5)

## 2019-09-27 ASSESSMENT — ANXIETY QUESTIONNAIRES
4. TROUBLE RELAXING: SEVERAL DAYS
1. FEELING NERVOUS, ANXIOUS, OR ON EDGE: SEVERAL DAYS
7. FEELING AFRAID AS IF SOMETHING AWFUL MIGHT HAPPEN: NOT AT ALL
2. NOT BEING ABLE TO STOP OR CONTROL WORRYING: NOT AT ALL
GAD7 TOTAL SCORE: 3
6. BECOMING EASILY ANNOYED OR IRRITABLE: NOT AT ALL
GAD7 TOTAL SCORE: 3
5. BEING SO RESTLESS THAT IT IS HARD TO SIT STILL: NOT AT ALL
7. FEELING AFRAID AS IF SOMETHING AWFUL MIGHT HAPPEN: NOT AT ALL
GAD7 TOTAL SCORE: 3
3. WORRYING TOO MUCH ABOUT DIFFERENT THINGS: SEVERAL DAYS

## 2019-09-27 ASSESSMENT — PATIENT HEALTH QUESTIONNAIRE - PHQ9
SUM OF ALL RESPONSES TO PHQ QUESTIONS 1-9: 4
SUM OF ALL RESPONSES TO PHQ QUESTIONS 1-9: 4
10. IF YOU CHECKED OFF ANY PROBLEMS, HOW DIFFICULT HAVE THESE PROBLEMS MADE IT FOR YOU TO DO YOUR WORK, TAKE CARE OF THINGS AT HOME, OR GET ALONG WITH OTHER PEOPLE: NOT DIFFICULT AT ALL

## 2019-09-27 ASSESSMENT — MIFFLIN-ST. JEOR: SCORE: 1280.44

## 2019-09-28 ASSESSMENT — ANXIETY QUESTIONNAIRES: GAD7 TOTAL SCORE: 3

## 2019-10-18 DIAGNOSIS — Z00.00 ROUTINE GENERAL MEDICAL EXAMINATION AT A HEALTH CARE FACILITY: ICD-10-CM

## 2019-10-18 LAB
CHOLEST SERPL-MCNC: 147 MG/DL
GLUCOSE SERPL-MCNC: 95 MG/DL (ref 70–99)
HDLC SERPL-MCNC: 53 MG/DL
HGB BLD-MCNC: 12.2 G/DL (ref 11.7–15.7)
LDLC SERPL CALC-MCNC: 76 MG/DL
NONHDLC SERPL-MCNC: 94 MG/DL
TRIGL SERPL-MCNC: 90 MG/DL
TSH SERPL DL<=0.005 MIU/L-ACNC: 1.26 MU/L (ref 0.4–4)

## 2019-10-18 PROCEDURE — 36415 COLL VENOUS BLD VENIPUNCTURE: CPT | Performed by: NURSE PRACTITIONER

## 2019-10-18 PROCEDURE — 85018 HEMOGLOBIN: CPT | Performed by: NURSE PRACTITIONER

## 2019-10-18 PROCEDURE — 84443 ASSAY THYROID STIM HORMONE: CPT | Performed by: NURSE PRACTITIONER

## 2019-10-18 PROCEDURE — 80061 LIPID PANEL: CPT | Performed by: NURSE PRACTITIONER

## 2019-10-18 PROCEDURE — 82947 ASSAY GLUCOSE BLOOD QUANT: CPT | Performed by: NURSE PRACTITIONER

## 2019-11-07 DIAGNOSIS — R05.9 COUGH: ICD-10-CM

## 2019-11-08 RX ORDER — ALBUTEROL SULFATE 90 UG/1
AEROSOL, METERED RESPIRATORY (INHALATION)
Qty: 17 G | Refills: 7 | Status: SHIPPED | OUTPATIENT
Start: 2019-11-08 | End: 2020-10-08

## 2019-11-08 NOTE — TELEPHONE ENCOUNTER
Pending Prescriptions:                       Disp   Refills    PROAIR  (90 Base) MCG/ACT inhaler [*17 g   7        Sig: USE 2 INHALATIONS EVERY 6 HOURS    Routing refill request to provider for review/approval because:  Drug not on the [facility] refill protocol for Associated Diagnoses     Cough [R05]

## 2019-12-23 ENCOUNTER — OFFICE VISIT (OUTPATIENT)
Dept: FAMILY MEDICINE | Facility: CLINIC | Age: 39
End: 2019-12-23
Payer: COMMERCIAL

## 2019-12-23 VITALS
TEMPERATURE: 99.2 F | WEIGHT: 134.5 LBS | SYSTOLIC BLOOD PRESSURE: 102 MMHG | BODY MASS INDEX: 23.98 KG/M2 | RESPIRATION RATE: 12 BRPM | HEART RATE: 88 BPM | OXYGEN SATURATION: 99 % | DIASTOLIC BLOOD PRESSURE: 62 MMHG

## 2019-12-23 DIAGNOSIS — J20.9 ACUTE BRONCHITIS, UNSPECIFIED ORGANISM: Primary | ICD-10-CM

## 2019-12-23 PROCEDURE — 99213 OFFICE O/P EST LOW 20 MIN: CPT | Performed by: FAMILY MEDICINE

## 2019-12-23 RX ORDER — BENZONATATE 100 MG/1
100 CAPSULE ORAL 3 TIMES DAILY PRN
Qty: 15 CAPSULE | Refills: 0 | Status: SHIPPED | OUTPATIENT
Start: 2019-12-23 | End: 2020-01-28

## 2019-12-23 RX ORDER — METHYLPREDNISOLONE 4 MG
TABLET, DOSE PACK ORAL
Qty: 21 TABLET | Refills: 0 | Status: SHIPPED | OUTPATIENT
Start: 2019-12-23 | End: 2020-01-28

## 2019-12-23 ASSESSMENT — ANXIETY QUESTIONNAIRES
GAD7 TOTAL SCORE: 2
5. BEING SO RESTLESS THAT IT IS HARD TO SIT STILL: NOT AT ALL
7. FEELING AFRAID AS IF SOMETHING AWFUL MIGHT HAPPEN: NOT AT ALL
4. TROUBLE RELAXING: NOT AT ALL
7. FEELING AFRAID AS IF SOMETHING AWFUL MIGHT HAPPEN: NOT AT ALL
1. FEELING NERVOUS, ANXIOUS, OR ON EDGE: NOT AT ALL
3. WORRYING TOO MUCH ABOUT DIFFERENT THINGS: SEVERAL DAYS
6. BECOMING EASILY ANNOYED OR IRRITABLE: SEVERAL DAYS
2. NOT BEING ABLE TO STOP OR CONTROL WORRYING: NOT AT ALL
GAD7 TOTAL SCORE: 2
GAD7 TOTAL SCORE: 2

## 2019-12-23 ASSESSMENT — PATIENT HEALTH QUESTIONNAIRE - PHQ9
10. IF YOU CHECKED OFF ANY PROBLEMS, HOW DIFFICULT HAVE THESE PROBLEMS MADE IT FOR YOU TO DO YOUR WORK, TAKE CARE OF THINGS AT HOME, OR GET ALONG WITH OTHER PEOPLE: SOMEWHAT DIFFICULT
SUM OF ALL RESPONSES TO PHQ QUESTIONS 1-9: 6
SUM OF ALL RESPONSES TO PHQ QUESTIONS 1-9: 6

## 2019-12-23 ASSESSMENT — PAIN SCALES - GENERAL: PAINLEVEL: MODERATE PAIN (4)

## 2019-12-23 NOTE — PROGRESS NOTES
Subjective     Barbara Yates is a 39 year old female who presents to clinic today for the following health issues:    History of Present Illness        She eats 2-3 servings of fruits and vegetables daily.She consumes 2 sweetened beverage(s) daily.  She is taking medications regularly.     Acute Illness   Acute illness concerns: Cough/losing voice  Onset: 1 week    Fever: no     Chills/Sweats: no     Headache (location?): no     Sinus Pressure:no    Conjunctivitis:  YES- but not anymore    Ear Pain: no    Rhinorrhea: no     Congestion: YES- not anymore    Sore Throat: YES     Cough: YES    Wheeze: no     Decreased Appetite: YES    Nausea: no     Vomiting: no     Diarrhea:  no     Dysuria/Freq.: no     Fatigue/Achiness: YES- Fatigue, but not body aches. Get tired easliy    Sick/Strep Exposure: no      Therapies Tried and outcome: Advil cold and sinus, recola, nyquil, hot water and put medicine in it with no improvement.     Patient Active Problem List   Diagnosis     Disturbance in sleep behavior     Urinary frequency     Papanicolaou smear of cervix with low grade squamous intraepithelial lesion (LGSIL)     Other abnormal Papanicolaou smear of cervix and cervical HPV(795.09)     CARDIOVASCULAR SCREENING; LDL GOAL LESS THAN 160     Generalized anxiety disorder     Moderate major depression (H)     Anal fissure     Acquired subluxation of left patella, subsequent encounter     Past Surgical History:   Procedure Laterality Date     ARTHROSCOPY KNEE RT/LT Left 9/8/16     AS CLOSED TX NASAL BONE FRACTURE W/ STABILIZATION  3/1/16     EXAM OF VAGINA,COLPOSCOPY  2006, 2005     EYE SURGERY  2008    Lasik eye surgery       Social History     Tobacco Use     Smoking status: Former Smoker     Packs/day: 0.00     Years: 15.00     Pack years: 0.00     Types: Cigarettes     Smokeless tobacco: Never Used     Tobacco comment: quit date 8/2010    Substance Use Topics     Alcohol use: Yes     Comment: rarely     Family History    Problem Relation Age of Onset     Hypertension Mother      Unknown/Adopted Father      Family History Negative Other          Current Outpatient Medications   Medication Sig Dispense Refill     benzonatate (TESSALON) 100 MG capsule Take 1 capsule (100 mg) by mouth 3 times daily as needed for cough 15 capsule 0     busPIRone (BUSPAR) 15 MG tablet Take 1 tablet (15 mg) by mouth daily 90 tablet 1     Melatonin 10 MG TABS tablet Take 10 mg by mouth nightly as needed for sleep       methylPREDNISolone (MEDROL DOSEPAK) 4 MG tablet therapy pack Follow Package Directions 21 tablet 0     traZODone (DESYREL) 50 MG tablet TAKE ONE-HALF (1/2) TABLET NIGHTLY AS NEEDED FOR SLEEP (Patient taking differently: TAKE ONE-HALF (1/2) TABLET NIGHTLY AS NEEDED FOR SLEEP) 45 tablet 1     venlafaxine (EFFEXOR-XR) 150 MG 24 hr capsule Take 1 capsule (150 mg) by mouth daily Taking with 75 mg for total of 225 mg QD (Patient taking differently: Take 150 mg by mouth daily Taking with 75 mg for total of 225 mg QD) 90 capsule 1     Calcium Carbonate-Vitamin D (CALCIUM + D PO) Take  by mouth.       cetirizine HCl (ZYRTEC) 10 MG CHEW Take by mouth daily Reported on 5/8/2017       desonide (DESOWEN) 0.05 % cream Reported on 5/8/2017       DimenhyDRINATE (DRAMAMINE PO) Take  by mouth. As needed for dizziness       Ferrous Sulfate (SLOW FE PO) Reported on 5/8/2017       fluticasone (FLONASE) 50 MCG/ACT spray Spray 1-2 sprays into both nostrils daily (Patient not taking: Reported on 4/24/2019) 1 Bottle 0     ibuprofen (ADVIL/MOTRIN) 600 MG tablet Take 1 tablet (600 mg) by mouth every 6 hours as needed (Patient not taking: Reported on 12/23/2019) 30 tablet 0     Prenatal Vit-Fe Sulfate-FA (PRENATAL MULTIVIT-IRON PO) Reported on 5/8/2017       PROAIR  (90 Base) MCG/ACT inhaler USE 2 INHALATIONS EVERY 6 HOURS (Patient not taking: Reported on 12/23/2019) 17 g 7     Allergies   Allergen Reactions     Seasonal Allergies      Recent Labs   Lab Test  10/18/19  0844 09/21/18  0851 10/31/17  0932   LDL 76 81 90   HDL 53 53 56   TRIG 90 63 118   TSH 1.26 1.43  --       BP Readings from Last 3 Encounters:   12/23/19 102/62   09/27/19 90/60   04/24/19 106/68    Wt Readings from Last 3 Encounters:   12/23/19 61 kg (134 lb 8 oz)   09/27/19 64 kg (141 lb)   04/24/19 62.6 kg (138 lb 1.6 oz)                    Answers for HPI/ROS submitted by the patient on 12/23/2019   Chronic problems general questions HPI Form  If you checked off any problems, how difficult have these problems made it for you to do your work, take care of things at home, or get along with other people?: Somewhat difficult  PHQ9 TOTAL SCORE: 6  NELL 7 TOTAL SCORE: 2    Reviewed and updated as needed this visit by Provider         Review of Systems   ROS COMP: Constitutional, HEENT, cardiovascular, pulmonary, gi and gu systems are negative, except as otherwise noted.      Objective    /62   Pulse 88   Temp 99.2  F (37.3  C) (Temporal)   Resp 12   Wt 61 kg (134 lb 8 oz)   LMP 12/13/2019 (Approximate)   SpO2 99%   BMI 23.98 kg/m    Body mass index is 23.98 kg/m .  Physical Exam   GENERAL: alert, no distress and mildly ill-appearing, raspy voice  EYES: Eyes grossly normal to inspection, PERRL and conjunctivae and sclerae normal  HENT: ear canals and TM's normal, nose and mouth without ulcers or lesions  NECK: no adenopathy, no asymmetry, masses, or scars and thyroid normal to palpation  RESP: lungs clear to auscultation - no rales, rhonchi or wheezes  CV: regular rate and rhythm, normal S1 S2, no S3 or S4, no murmur, click or rub, no peripheral edema and peripheral pulses strong  ABDOMEN: soft, nontender, no hepatosplenomegaly, no masses and bowel sounds normal  MS: no gross musculoskeletal defects noted, no edema  NEURO: Normal strength and tone, mentation intact and speech normal  BACK: no CVA tenderness, no paralumbar tenderness  PSYCH: mentation appears normal, affect  normal/bright    Diagnostic Test Results:  Labs reviewed in Epic        ASSESSMENT and PLAN  1. Acute bronchitis, unspecified organism  39-year-old female with 5-day history of respiratory symptoms, seems to be worsening, no improvement.  Examination today was reassuring.  We discussed likely viral bronchitis, mainstay of treatment would be rest, good hydration, anticipate improvement over the next few days.  Given worsening, patient's desire to try something to speed this up will give Medrol Dosepak, discussed limitations.  Will also help treat the cough so she can get better rest.  Follow-up if no improvement or any worsening.  - methylPREDNISolone (MEDROL DOSEPAK) 4 MG tablet therapy pack; Follow Package Directions  Dispense: 21 tablet; Refill: 0  - benzonatate (TESSALON) 100 MG capsule; Take 1 capsule (100 mg) by mouth 3 times daily as needed for cough  Dispense: 15 capsule; Refill: 0    Return in about 6 months (around 6/23/2020) for Annual Well Check.     Sidney Burrell MD, FAAFP  Family Medicine Physician  [facility] [place]  [address] [place] [address]

## 2019-12-23 NOTE — PATIENT INSTRUCTIONS
Barbara,    It was great seeing you in clinic today.  I summarized our discussion and your plan below.  Please let me know if you have any questions or concerns.  Please follow up with me as discussed in clinic or sooner if any worsening or additional concerns.     1. Acute bronchitis, unspecified organism  39-year-old female with 5-day history of respiratory symptoms, seems to be worsening, no improvement.  Examination today was reassuring.  We discussed likely viral bronchitis, mainstay of treatment would be rest, good hydration, anticipate improvement over the next few days.  Given worsening, patient's desire to try something to speed this up will give Medrol Dosepak, discussed limitations.  Will also help treat the cough so she can get better rest.  Follow-up if no improvement or any worsening.  - methylPREDNISolone (MEDROL DOSEPAK) 4 MG tablet therapy pack; Follow Package Directions  Dispense: 21 tablet; Refill: 0  - benzonatate (TESSALON) 100 MG capsule; Take 1 capsule (100 mg) by mouth 3 times daily as needed for cough  Dispense: 15 capsule; Refill: 0       Sincerely,  Dr. ANASTASIYA Burrell MD, FAAFP  Family Medicine Physician  [facility] [place]  [address] [place] [address]    Patient Education

## 2019-12-24 ASSESSMENT — ANXIETY QUESTIONNAIRES: GAD7 TOTAL SCORE: 2

## 2019-12-24 ASSESSMENT — PATIENT HEALTH QUESTIONNAIRE - PHQ9: SUM OF ALL RESPONSES TO PHQ QUESTIONS 1-9: 6

## 2020-01-27 NOTE — PROGRESS NOTES
Subjective     Barbara Yates is a 39 year old female who presents to clinic today for the following health issues:    History of Present Illness        She eats 2-3 servings of fruits and vegetables daily.She consumes 1 sweetened beverage(s) daily.She exercises with enough effort to increase her heart rate 9 or less minutes per day.  She exercises with enough effort to increase her heart rate 3 or less days per week.   She is taking medications regularly.     Answers for HPI/ROS submitted by the patient on 1/28/2020   Chronic problems general questions HPI Form  If you checked off any problems, how difficult have these problems made it for you to do your work, take care of things at home, or get along with other people?: Not difficult at all  PHQ9 TOTAL SCORE: 1  NELL 7 TOTAL SCORE: 0    Patient reports to the clinic to discuss contraception options. She is only dating one person at this time and she relates that if it doesn't work with him she will probably take a break from dating.    Her menstrual period has always been more irregular, but she has been having lighter flow.     LMP 12/6/19. Periods are usually every 60 days for her. She die have intercourse in the last 10 days, unprotected.    Patient Active Problem List   Diagnosis     Disturbance in sleep behavior     Urinary frequency     Papanicolaou smear of cervix with low grade squamous intraepithelial lesion (LGSIL)     Other abnormal Papanicolaou smear of cervix and cervical HPV(795.09)     CARDIOVASCULAR SCREENING; LDL GOAL LESS THAN 160     Generalized anxiety disorder     Moderate major depression (H)     Anal fissure     Acquired subluxation of left patella, subsequent encounter     Past Surgical History:   Procedure Laterality Date     ARTHROSCOPY KNEE RT/LT Left 9/8/16     AS CLOSED TX NASAL BONE FRACTURE W/ STABILIZATION  3/1/16     EXAM OF VAGINA,COLPOSCOPY  2006, 2005     EYE SURGERY  2008    Lasik eye surgery       Social History      Tobacco Use     Smoking status: Former Smoker     Packs/day: 0.00     Years: 15.00     Pack years: 0.00     Types: Cigarettes     Smokeless tobacco: Never Used     Tobacco comment: quit date 8/2010    Substance Use Topics     Alcohol use: Yes     Comment: 0-2 per week      Family History   Problem Relation Age of Onset     Hypertension Mother      Unknown/Adopted Father      Family History Negative Other          Current Outpatient Medications   Medication Sig Dispense Refill     busPIRone (BUSPAR) 15 MG tablet Take 1 tablet (15 mg) by mouth daily 90 tablet 1     Calcium Carbonate-Vitamin D (CALCIUM + D PO) Take  by mouth.       cetirizine HCl (ZYRTEC) 10 MG CHEW Take by mouth daily Reported on 5/8/2017       desonide (DESOWEN) 0.05 % cream Reported on 5/8/2017       DimenhyDRINATE (DRAMAMINE PO) Take  by mouth. As needed for dizziness       Ferrous Sulfate (SLOW FE PO) Reported on 5/8/2017       fluticasone (FLONASE) 50 MCG/ACT spray Spray 1-2 sprays into both nostrils daily 1 Bottle 0     ibuprofen (ADVIL/MOTRIN) 600 MG tablet Take 1 tablet (600 mg) by mouth every 6 hours as needed 30 tablet 0     levonorgestrel (KYLEENA) 19.5 MG IUD 1 each (19.5 mg) by Intrauterine route once for 1 dose 1 each 0     Melatonin 10 MG TABS tablet Take 10 mg by mouth nightly as needed for sleep       Prenatal Vit-Fe Sulfate-FA (PRENATAL MULTIVIT-IRON PO) Reported on 5/8/2017       PROAIR  (90 Base) MCG/ACT inhaler USE 2 INHALATIONS EVERY 6 HOURS 17 g 7     traZODone (DESYREL) 50 MG tablet TAKE ONE-HALF (1/2) TABLET NIGHTLY AS NEEDED FOR SLEEP (Patient taking differently: TAKE ONE-HALF (1/2) TABLET NIGHTLY AS NEEDED FOR SLEEP) 45 tablet 1     venlafaxine (EFFEXOR-XR) 150 MG 24 hr capsule Take 2 capsules (300 mg) by mouth daily 180 capsule 1     Allergies   Allergen Reactions     Seasonal Allergies      Recent Labs   Lab Test 10/18/19  0844 09/21/18  0851 10/31/17  0932   LDL 76 81 90   HDL 53 53 56   TRIG 90 63 118   TSH  "1.26 1.43  --       BP Readings from Last 3 Encounters:   01/28/20 124/56   12/23/19 102/62   09/27/19 90/60    Wt Readings from Last 3 Encounters:   01/28/20 59.1 kg (130 lb 4.8 oz)   12/23/19 61 kg (134 lb 8 oz)   09/27/19 64 kg (141 lb)        Reviewed and updated as needed this visit by Provider         Review of Systems   ROS COMP: Constitutional, HEENT, cardiovascular, pulmonary, GI, , musculoskeletal, neuro, skin, endocrine and psych systems are negative, except as otherwise noted.    This document serves as a record of the services and decisions personally performed and made by Nicole Soto CNP. It was created on his/her behalf by Jossie Bull, trained medical scribe. The creation of this document is based the provider's statements to the medical scribes.    Scribalea Bull 4:25 PM, January 28, 2020      Objective    /56   Pulse 89   Temp 98.3  F (36.8  C) (Temporal)   Resp 18   Ht 1.595 m (5' 2.8\")   Wt 59.1 kg (130 lb 4.8 oz)   LMP 12/06/2019 (Approximate)   SpO2 99%   Breastfeeding No   BMI 23.23 kg/m    Body mass index is 23.23 kg/m .     Physical Exam   GENERAL: healthy, alert and no distress  ABDOMEN: soft, nontender, no hepatosplenomegaly, no masses and bowel sounds normal   (female): normal female external genitalia, normal urethral meatus, vaginal mucosa, normal cervix/adnexa/uterus without masses or discharge  Neurologic: negative  Psychiatric: mentation appears normal and affect normal/bright      Procedure: IIUD placement  Type of IUD: Kyleena    She is placed in a dorsal lithotomy potion and, after informed consent was obtained from the patient.  The a speculum was placed in the vagina to visualized the cervix. The cervix was then swabbed with a betadine prep and Xylocaine jelly applied. The uterus sounded to 8 cm. The Kyleena IUD was then placed in the usual fashion under sterile technique. Strings were clipped about 5 cm from the cervical os.  Cervix was hemostatic. " There were no complications. The patient tolerated the procedure well.    Diagnostic Test Results:  Labs reviewed in Epic  Results for orders placed or performed in visit on 01/28/20 (from the past 24 hour(s))   HCG Qual, Urine ([id number])   Result Value Ref Range    HCG Qual Urine Negative NEG^Negative           Assessment & Plan       ICD-10-CM    1. Encounter for IUD insertion Z30.430 levonorgestrel (KYLEENA) 19.5 MG IUD 19.5 mg     HCG Qual, Urine ([id number])   2. Moderate major depression (H) F32.1 OFFICE/OUTPT VISIT,EST,LEVL IV   3. Encounter for other general counseling or advice on contraception Z30.09 OFFICE/OUTPT VISIT,EST,LEVL IV   4. Screen for STD (sexually transmitted disease) Z11.3 Neisseria gonorrhoeae PCR     Chlamydia trachomatis PCR     OFFICE/OUTPT VISIT,EST,LEVL IV   5. Disturbance in sleep behavior G47.9 venlafaxine (EFFEXOR-XR) 150 MG 24 hr capsule   6. Generalized anxiety disorder F41.1 venlafaxine (EFFEXOR-XR) 150 MG 24 hr capsule     Discussed contraception, mood, healthy diet, and regular exercise at length with patient today.     Discussed contraception with patient today and after consideration she elected to get a Kyleena IUD. Qualitative urine HCG; lab placed today; result was negative. Updated routine STD screening labs; will notify with results.     Discussed with the patient that she should feel for the IUD strings after her next menses. If unable to locate them, she should return to clinic for a speculum examination for confirmation that the IUD is in place. Bleeding pattern of this particular IUD was discussed with the patient. She is aware that the IUD will need to be removed in 5 years or PRN.      She is to return to clinic in 6 weeks, for her next annual, or PRN.    The information in this document, created by the medical scribe for me, accurately reflects the services I personally performed and the decisions made by me. I have reviewed and approved this document for accuracy  prior to leaving the patient care area.  Nicole Soto CNP  4:25 PM, 01/28/20    MANJULA Guerrero CNP  [facility]

## 2020-01-28 ENCOUNTER — OFFICE VISIT (OUTPATIENT)
Dept: FAMILY MEDICINE | Facility: CLINIC | Age: 40
End: 2020-01-28
Payer: COMMERCIAL

## 2020-01-28 VITALS
RESPIRATION RATE: 18 BRPM | WEIGHT: 130.3 LBS | OXYGEN SATURATION: 99 % | DIASTOLIC BLOOD PRESSURE: 56 MMHG | HEART RATE: 89 BPM | HEIGHT: 63 IN | TEMPERATURE: 98.3 F | BODY MASS INDEX: 23.09 KG/M2 | SYSTOLIC BLOOD PRESSURE: 124 MMHG

## 2020-01-28 DIAGNOSIS — G47.9 DISTURBANCE IN SLEEP BEHAVIOR: ICD-10-CM

## 2020-01-28 DIAGNOSIS — F32.1 MODERATE MAJOR DEPRESSION (H): ICD-10-CM

## 2020-01-28 DIAGNOSIS — F41.1 GENERALIZED ANXIETY DISORDER: ICD-10-CM

## 2020-01-28 DIAGNOSIS — Z30.430 ENCOUNTER FOR IUD INSERTION: Primary | ICD-10-CM

## 2020-01-28 DIAGNOSIS — Z11.3 SCREEN FOR STD (SEXUALLY TRANSMITTED DISEASE): ICD-10-CM

## 2020-01-28 DIAGNOSIS — Z30.09 ENCOUNTER FOR OTHER GENERAL COUNSELING OR ADVICE ON CONTRACEPTION: ICD-10-CM

## 2020-01-28 LAB — HCG UR QL: NEGATIVE

## 2020-01-28 PROCEDURE — 58300 INSERT INTRAUTERINE DEVICE: CPT | Performed by: NURSE PRACTITIONER

## 2020-01-28 PROCEDURE — 99214 OFFICE O/P EST MOD 30 MIN: CPT | Mod: 25 | Performed by: NURSE PRACTITIONER

## 2020-01-28 PROCEDURE — 81025 URINE PREGNANCY TEST: CPT | Performed by: NURSE PRACTITIONER

## 2020-01-28 PROCEDURE — 87591 N.GONORRHOEAE DNA AMP PROB: CPT | Performed by: NURSE PRACTITIONER

## 2020-01-28 PROCEDURE — 96127 BRIEF EMOTIONAL/BEHAV ASSMT: CPT | Performed by: NURSE PRACTITIONER

## 2020-01-28 PROCEDURE — 87491 CHLMYD TRACH DNA AMP PROBE: CPT | Performed by: NURSE PRACTITIONER

## 2020-01-28 RX ORDER — VENLAFAXINE HYDROCHLORIDE 150 MG/1
300 CAPSULE, EXTENDED RELEASE ORAL DAILY
Qty: 180 CAPSULE | Refills: 1 | Status: SHIPPED | OUTPATIENT
Start: 2020-01-28 | End: 2020-10-08

## 2020-01-28 ASSESSMENT — ANXIETY QUESTIONNAIRES
3. WORRYING TOO MUCH ABOUT DIFFERENT THINGS: NOT AT ALL
7. FEELING AFRAID AS IF SOMETHING AWFUL MIGHT HAPPEN: NOT AT ALL
GAD7 TOTAL SCORE: 0
6. BECOMING EASILY ANNOYED OR IRRITABLE: NOT AT ALL
2. NOT BEING ABLE TO STOP OR CONTROL WORRYING: NOT AT ALL
1. FEELING NERVOUS, ANXIOUS, OR ON EDGE: NOT AT ALL
7. FEELING AFRAID AS IF SOMETHING AWFUL MIGHT HAPPEN: NOT AT ALL
5. BEING SO RESTLESS THAT IT IS HARD TO SIT STILL: NOT AT ALL
GAD7 TOTAL SCORE: 0
GAD7 TOTAL SCORE: 0
4. TROUBLE RELAXING: NOT AT ALL

## 2020-01-28 ASSESSMENT — PATIENT HEALTH QUESTIONNAIRE - PHQ9
10. IF YOU CHECKED OFF ANY PROBLEMS, HOW DIFFICULT HAVE THESE PROBLEMS MADE IT FOR YOU TO DO YOUR WORK, TAKE CARE OF THINGS AT HOME, OR GET ALONG WITH OTHER PEOPLE: NOT DIFFICULT AT ALL
SUM OF ALL RESPONSES TO PHQ QUESTIONS 1-9: 1
SUM OF ALL RESPONSES TO PHQ QUESTIONS 1-9: 1

## 2020-01-28 ASSESSMENT — MIFFLIN-ST. JEOR: SCORE: 1231.99

## 2020-01-29 ASSESSMENT — ANXIETY QUESTIONNAIRES: GAD7 TOTAL SCORE: 0

## 2020-01-29 ASSESSMENT — PATIENT HEALTH QUESTIONNAIRE - PHQ9: SUM OF ALL RESPONSES TO PHQ QUESTIONS 1-9: 1

## 2020-01-30 LAB
C TRACH DNA SPEC QL NAA+PROBE: NEGATIVE
N GONORRHOEA DNA SPEC QL NAA+PROBE: NEGATIVE
SPECIMEN SOURCE: NORMAL
SPECIMEN SOURCE: NORMAL

## 2020-03-03 ENCOUNTER — OFFICE VISIT (OUTPATIENT)
Dept: FAMILY MEDICINE | Facility: CLINIC | Age: 40
End: 2020-03-03
Payer: COMMERCIAL

## 2020-03-03 ENCOUNTER — TELEPHONE (OUTPATIENT)
Dept: FAMILY MEDICINE | Facility: CLINIC | Age: 40
End: 2020-03-03

## 2020-03-03 VITALS
TEMPERATURE: 98.5 F | WEIGHT: 134.3 LBS | OXYGEN SATURATION: 99 % | BODY MASS INDEX: 23.8 KG/M2 | HEART RATE: 84 BPM | SYSTOLIC BLOOD PRESSURE: 110 MMHG | DIASTOLIC BLOOD PRESSURE: 64 MMHG | HEIGHT: 63 IN | RESPIRATION RATE: 18 BRPM

## 2020-03-03 DIAGNOSIS — B96.89 BV (BACTERIAL VAGINOSIS): Primary | ICD-10-CM

## 2020-03-03 DIAGNOSIS — Z30.012 EMERGENCY CONTRACEPTION: ICD-10-CM

## 2020-03-03 DIAGNOSIS — Z30.431 INTRAUTERINE DEVICE SURVEILLANCE: Primary | ICD-10-CM

## 2020-03-03 DIAGNOSIS — N76.0 BV (BACTERIAL VAGINOSIS): Primary | ICD-10-CM

## 2020-03-03 DIAGNOSIS — R05.9 COUGH: ICD-10-CM

## 2020-03-03 DIAGNOSIS — N89.8 VAGINAL DISCHARGE: ICD-10-CM

## 2020-03-03 LAB
SPECIMEN SOURCE: ABNORMAL
WET PREP SPEC: ABNORMAL

## 2020-03-03 PROCEDURE — 99214 OFFICE O/P EST MOD 30 MIN: CPT | Performed by: NURSE PRACTITIONER

## 2020-03-03 PROCEDURE — 87210 SMEAR WET MOUNT SALINE/INK: CPT | Performed by: NURSE PRACTITIONER

## 2020-03-03 RX ORDER — LEVONORGESTREL 1.5 MG/1
1.5 TABLET ORAL ONCE
Qty: 1 TABLET | Refills: 0 | Status: SHIPPED | OUTPATIENT
Start: 2020-03-03 | End: 2020-05-05

## 2020-03-03 RX ORDER — METRONIDAZOLE 500 MG/1
500 TABLET ORAL 2 TIMES DAILY
Qty: 14 TABLET | Refills: 0 | Status: SHIPPED | OUTPATIENT
Start: 2020-03-03 | End: 2020-05-05

## 2020-03-03 ASSESSMENT — PATIENT HEALTH QUESTIONNAIRE - PHQ9
SUM OF ALL RESPONSES TO PHQ QUESTIONS 1-9: 4
10. IF YOU CHECKED OFF ANY PROBLEMS, HOW DIFFICULT HAVE THESE PROBLEMS MADE IT FOR YOU TO DO YOUR WORK, TAKE CARE OF THINGS AT HOME, OR GET ALONG WITH OTHER PEOPLE: NOT DIFFICULT AT ALL
SUM OF ALL RESPONSES TO PHQ QUESTIONS 1-9: 4

## 2020-03-03 ASSESSMENT — ANXIETY QUESTIONNAIRES
GAD7 TOTAL SCORE: 2
7. FEELING AFRAID AS IF SOMETHING AWFUL MIGHT HAPPEN: NOT AT ALL
1. FEELING NERVOUS, ANXIOUS, OR ON EDGE: NOT AT ALL
5. BEING SO RESTLESS THAT IT IS HARD TO SIT STILL: NOT AT ALL
7. FEELING AFRAID AS IF SOMETHING AWFUL MIGHT HAPPEN: NOT AT ALL
2. NOT BEING ABLE TO STOP OR CONTROL WORRYING: NOT AT ALL
4. TROUBLE RELAXING: NOT AT ALL
3. WORRYING TOO MUCH ABOUT DIFFERENT THINGS: SEVERAL DAYS
6. BECOMING EASILY ANNOYED OR IRRITABLE: SEVERAL DAYS
GAD7 TOTAL SCORE: 2
GAD7 TOTAL SCORE: 2

## 2020-03-03 ASSESSMENT — PAIN SCALES - GENERAL: PAINLEVEL: NO PAIN (0)

## 2020-03-03 ASSESSMENT — MIFFLIN-ST. JEOR: SCORE: 1245.37

## 2020-03-03 NOTE — PROGRESS NOTES
"Subjective     Barbara Yates is a 39 year old female who presents to clinic today for the following health issues:    History of Present Illness        She eats 2-3 servings of fruits and vegetables daily.She consumes 2 sweetened beverage(s) daily.She exercises with enough effort to increase her heart rate 9 or less minutes per day.  She exercises with enough effort to increase her heart rate 3 or less days per week.   She is taking medications regularly.     Patient here today for recheck of IUD after having it placed. Had first intercourse about 60 hours ago, after IUD placement.     Acute Illness   Acute illness concerns: cough  Onset: a week and a half ago    Fever: no     Chills/Sweats: YES- chills    Headache (location?): YES    Sinus Pressure:YES    Conjunctivitis:  no    Ear Pain: no    Rhinorrhea: no     Congestion: YES    Sore Throat: YES- scratchy     Cough: YES    Wheeze: YES- some    Decreased Appetite: YES- some food don't taste the same    Nausea: no     Vomiting: no     Diarrhea:  YES    Dysuria/Freq.: no     Fatigue/Achiness: YES- achiness    Sick/Strep Exposure: YES- sone     Therapies Tried and outcome: Advil cold and sinus            Reviewed and updated as needed this visit by Provider  Tobacco  Allergies  Meds  Problems  Med Hx  Surg Hx  Fam Hx         Review of Systems   ROS COMP: Constitutional, HEENT, cardiovascular, pulmonary, gi and gu systems are negative, except as otherwise noted.      Objective    /64   Pulse 84   Temp 98.5  F (36.9  C) (Temporal)   Resp 18   Ht 1.588 m (5' 2.5\")   Wt 60.9 kg (134 lb 4.8 oz)   LMP 12/13/2019   SpO2 99%   BMI 24.17 kg/m    Body mass index is 24.17 kg/m .  Physical Exam   GENERAL APPEARANCE: healthy, alert and no distress  EYES: Eyes grossly normal to inspection and conjunctivae and sclerae normal  HENT: ear canals and TM's normal, nose and mouth without ulcers or lesions and nasal mucosa edematous without significant  " rhinorrhea  NECK: no adenopathy, no asymmetry, masses, or scars and thyroid normal to palpation  RESP: lungs clear to auscultation - no rales, rhonchi or wheezes  CV: regular rates and rhythm, normal S1 S2, no S3 or S4 and no murmur, click or rub   ABDOMEN: soft, nontender, no hepatosplenomegaly, no masses and bowel sounds normal   (female): normal female external genitalia, normal urethral meatus , vaginal mucosa pink, moist, well rugated, normal cervix, adnexae, and uterus without masses. and IUD strings not present, discharge white  SKIN: no suspicious lesions or rashes  NEURO: Normal strength and tone, mentation intact and speech normal  PSYCH: mentation appears normal, affect normal/bright    Diagnostic Test Results:  Labs reviewed in Epic  Results for orders placed or performed in visit on 03/03/20 (from the past 24 hour(s))   Wet prep   Result Value Ref Range    Specimen Description Vagina     Wet Prep No Trichomonas seen     Wet Prep Many  Clue cells seen   (A)     Wet Prep No yeast seen     Wet Prep No WBC's seen            Assessment & Plan       ICD-10-CM    1. Intrauterine device surveillance Z30.431 US Pelvic Limited   2. Vaginal discharge N89.8 Wet prep   3. Cough R05    4. Emergency contraception Z78.9 levonorgestrel (PLAN B) 1.5 MG tablet          Viral etiology to cough- should resolve soon, re-check in 2-3 weeks if not gone.   BV-treating with Flagyl.   IUD strings not seen, Follow-up ultrasound and Plan B, discussed rationale. Pt. Feels the device is in place and likely in uterus. Unable to see strings, getting imaging- will follow-up with options. If in place and no strings can stay for length of IUD.     Return in about 2 months (around 5/3/2020) for Imaging.    MANJULA Guerrero [facility]

## 2020-03-04 ASSESSMENT — ANXIETY QUESTIONNAIRES: GAD7 TOTAL SCORE: 2

## 2020-03-04 ASSESSMENT — PATIENT HEALTH QUESTIONNAIRE - PHQ9: SUM OF ALL RESPONSES TO PHQ QUESTIONS 1-9: 4

## 2020-03-05 ENCOUNTER — OFFICE VISIT (OUTPATIENT)
Dept: OBGYN | Facility: OTHER | Age: 40
End: 2020-03-05
Payer: COMMERCIAL

## 2020-03-05 ENCOUNTER — TELEPHONE (OUTPATIENT)
Dept: FAMILY MEDICINE | Facility: CLINIC | Age: 40
End: 2020-03-05

## 2020-03-05 ENCOUNTER — ANCILLARY PROCEDURE (OUTPATIENT)
Dept: ULTRASOUND IMAGING | Facility: OTHER | Age: 40
End: 2020-03-05
Attending: NURSE PRACTITIONER
Payer: COMMERCIAL

## 2020-03-05 VITALS
SYSTOLIC BLOOD PRESSURE: 102 MMHG | DIASTOLIC BLOOD PRESSURE: 60 MMHG | HEART RATE: 87 BPM | WEIGHT: 135.25 LBS | BODY MASS INDEX: 24.34 KG/M2

## 2020-03-05 DIAGNOSIS — Z30.431 INTRAUTERINE DEVICE SURVEILLANCE: ICD-10-CM

## 2020-03-05 DIAGNOSIS — O26.30 PREGNANCY COMPLICATED BY INTRAUTERINE DEVICE (IUD): Primary | ICD-10-CM

## 2020-03-05 PROCEDURE — 99203 OFFICE O/P NEW LOW 30 MIN: CPT | Performed by: OBSTETRICS & GYNECOLOGY

## 2020-03-05 PROCEDURE — 76817 TRANSVAGINAL US OBSTETRIC: CPT

## 2020-03-05 PROCEDURE — 76801 OB US < 14 WKS SINGLE FETUS: CPT

## 2020-03-05 RX ORDER — AZITHROMYCIN 500 MG/1
500 TABLET, FILM COATED ORAL DAILY
Qty: 1 TABLET | Refills: 0 | Status: SHIPPED | OUTPATIENT
Start: 2020-03-05 | End: 2020-05-05
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
## 2020-03-05 NOTE — TELEPHONE ENCOUNTER
Patient would like the information for the termination of preg.  Ok to leave a detailed message on [phone]

## 2020-03-18 ENCOUNTER — TELEPHONE (OUTPATIENT)
Dept: FAMILY MEDICINE | Facility: CLINIC | Age: 40
End: 2020-03-18

## 2020-03-18 NOTE — TELEPHONE ENCOUNTER
Spoke with pt. Symptoms are stable. IUD re-check in 6 weeks with KL- in person OV. Pt. Will call to schedule.  MANJULA Guerrero CNP

## 2020-03-18 NOTE — TELEPHONE ENCOUNTER
Reason for Call:  Patient has some bleeding    Detailed comments: Patient had a IUD/with pregnancy    Phone Number Patient can be reached at: Cell number on file:    Telephone Information:   Mobile [phone]       Best Time: anytime    Can we leave a detailed message on this number? YES    Call taken on 3/18/2020 at 10:16 AM by Noé Yates

## 2020-03-18 NOTE — TELEPHONE ENCOUNTER
Spoke with patient.   Last week had to terminate a pregnancy due to having an IUD    She is starting to notice more blood clots that are bigger than a quarter and they are getting larger that started yesterday.  Started lighter then increased    Huddled with Dr. Rivers.    informed patient that this can be normal with IUD and termination.  Monitor symptoms.  Any severe cramping or fever or bleeding that last longer than 2 weeks she would need to be seen.      Patient agree to plan.    Abdiel Taylor, RN, BSN

## 2020-04-27 ENCOUNTER — TELEPHONE (OUTPATIENT)
Dept: FAMILY MEDICINE | Facility: CLINIC | Age: 40
End: 2020-04-27

## 2020-04-27 NOTE — TELEPHONE ENCOUNTER
Patient would like to make a appt to come in to the clinic to have the someone check the string of her IUD.   She said she has been talking to Nicole Soto about this.

## 2020-04-27 NOTE — TELEPHONE ENCOUNTER
Pt scheduled for [facility] schedule next week.   Pt mentioned that she has a tickle cough due of allergies.   Huddled with provider okay to wait 1 week out for IUD check.     Adam Linton MA

## 2020-04-30 NOTE — PROGRESS NOTES
Subjective     Barbara Yates is a 39 year old female who presents to clinic today for the following health issues:    HPI     IUD check. Had Kyleena at outside clinic placed after elective AB in March due to IUD insertion complicated by pregnancy in January.   Spotting for  1 week. Had initial heavy menses, and then recent spotting occurred 6 weeks after this.   No pain during intercourse.   She cannot feel strings.   Wants STD screen    Dry patches on fingers. Dry, flaky, slightly red, without drainage at creases on sampson side of finger. On right hand, is right hand dominant.   Allergies have worsened this year.     Noted pap was not done at previous exams.       Mood- stable.     Having allergic rhinitis symptoms. Not helped by Flonase or OTC allergy medications.     Current Covid restrictions in place.         Patient Active Problem List   Diagnosis     Disturbance in sleep behavior     Urinary frequency     Papanicolaou smear of cervix with low grade squamous intraepithelial lesion (LGSIL)     Other abnormal Papanicolaou smear of cervix and cervical HPV(795.09)     CARDIOVASCULAR SCREENING; LDL GOAL LESS THAN 160     Generalized anxiety disorder     Moderate major depression (H)     Anal fissure     Acquired subluxation of left patella, subsequent encounter     Past Surgical History:   Procedure Laterality Date     ARTHROSCOPY KNEE RT/LT Left 9/8/16     AS CLOSED TX NASAL BONE FRACTURE W/ STABILIZATION  3/1/16     EXAM OF VAGINA,COLPOSCOPY  2006, 2005     EYE SURGERY  2008    Lasik eye surgery       Social History     Tobacco Use     Smoking status: Former Smoker     Packs/day: 0.00     Years: 15.00     Pack years: 0.00     Types: Cigarettes     Smokeless tobacco: Never Used     Tobacco comment: quit date 8/2010    Substance Use Topics     Alcohol use: Yes     Comment: 0-2 per week      Family History   Problem Relation Age of Onset     Hypertension Mother      Unknown/Adopted Father      Family History  "Negative Other            Reviewed and updated as needed this visit by Provider  Tobacco  Allergies  Meds  Problems  Med Hx  Surg Hx  Fam Hx         Review of Systems   ROS COMP: Constitutional, HEENT, cardiovascular, pulmonary, gi and gu systems are negative, except as otherwise noted.      Objective    BP 98/74   Pulse 85   Temp 97.5  F (36.4  C) (Temporal)   Resp 18   Ht 1.588 m (5' 2.5\")   Wt 63 kg (139 lb)   LMP  (LMP Unknown)   SpO2 99%   Breastfeeding No   BMI 25.02 kg/m    Body mass index is 25.02 kg/m .  Physical Exam   GENERAL: healthy, alert and no distress  EYES: Eyes grossly normal to inspection, PERRL and conjunctivae and sclerae normal  HENT: normal cephalic/atraumatic, ear canals and TM's normal, nose and mouth without ulcers or lesions, nasal mucosa edematous- almost fully occluded , rhinorrhea clear, oropharynx clear and oral mucous membranes moist  NECK: no adenopathy, no asymmetry, masses, or scars and thyroid normal to palpation  RESP: lungs clear to auscultation - no rales, rhonchi or wheezes  CV: regular rate and rhythm, normal S1 S2, no S3 or S4, no murmur, click or rub, no peripheral edema and peripheral pulses strong  ABDOMEN: soft, nontender, no hepatosplenomegaly, no masses and bowel sounds normal   (female): normal female external genitalia, normal urethral meatus , vaginal mucosa pink, moist, well rugated, normal cervix, adnexae, and uterus without masses. and blue IUD strings present to 4 cm, exam repeated with Spe[facility] to obtain pap and STD screen after chart review pap was due  MS: no gross musculoskeletal defects noted, no edema  SKIN: right finger, approximately 2-3 cm region. Dry, flaky, slightly red, without drainage at creases on sampson side of finger, no other suspicious lesions or rashes  NEURO: Normal strength and tone, mentation intact and speech normal  PSYCH: mentation appears normal, affect normal/bright    Diagnostic Test Results:  Labs reviewed in " "Epic        Assessment & Plan     1. Screening for malignant neoplasm of cervix  pending  - Pap imaged thin layer screen with HPV - recommended age 30 - 65 years (select HPV order below)  - HPV High Risk Types DNA Cervical    2. Intrauterine device surveillance  Normal exam, continue use. Discussed spotting- normal.     3. Flexural atopic dermatitis  Mild case.   - triamcinolone (KENALOG) 0.1 % external ointment; Apply topically 2 times daily  Dispense: 30 g; Refill: 0    4. Seasonal allergic rhinitis due to pollen  Adding Singulair to regime, can also add back in Flonase OTC. Pt. Aware of black box warning.     - montelukast (SINGULAIR) 10 MG tablet; Take 1 tablet (10 mg) by mouth At Bedtime  Dispense: 90 tablet; Refill: 1    5. Screen for STD (sexually transmitted disease)  Pending.   - Neisseria gonorrhoeae PCR  - Chlamydia trachomatis PCR     BMI:   Estimated body mass index is 25.02 kg/m  as calculated from the following:    Height as of this encounter: 1.588 m (5' 2.5\").    Weight as of this encounter: 63 kg (139 lb).   Weight management plan: healthy habits encouraged.         6 months mood virtual visit.     Return in about 6 months (around 11/5/2020) for Virtual Visit/Telephone Visit.    MANJULA Guerrero [facility]    "

## 2020-05-05 ENCOUNTER — RESULT FOLLOW UP (OUTPATIENT)
Dept: FAMILY MEDICINE | Facility: CLINIC | Age: 40
End: 2020-05-05

## 2020-05-05 ENCOUNTER — OFFICE VISIT (OUTPATIENT)
Dept: FAMILY MEDICINE | Facility: OTHER | Age: 40
End: 2020-05-05
Payer: COMMERCIAL

## 2020-05-05 VITALS
HEIGHT: 63 IN | SYSTOLIC BLOOD PRESSURE: 98 MMHG | HEART RATE: 85 BPM | TEMPERATURE: 97.5 F | OXYGEN SATURATION: 99 % | WEIGHT: 139 LBS | DIASTOLIC BLOOD PRESSURE: 74 MMHG | RESPIRATION RATE: 18 BRPM | BODY MASS INDEX: 24.63 KG/M2

## 2020-05-05 DIAGNOSIS — L20.89 FLEXURAL ATOPIC DERMATITIS: ICD-10-CM

## 2020-05-05 DIAGNOSIS — Z30.431 INTRAUTERINE DEVICE SURVEILLANCE: Primary | ICD-10-CM

## 2020-05-05 DIAGNOSIS — Z12.4 SCREENING FOR MALIGNANT NEOPLASM OF CERVIX: ICD-10-CM

## 2020-05-05 DIAGNOSIS — J30.1 SEASONAL ALLERGIC RHINITIS DUE TO POLLEN: ICD-10-CM

## 2020-05-05 DIAGNOSIS — Z11.3 SCREEN FOR STD (SEXUALLY TRANSMITTED DISEASE): ICD-10-CM

## 2020-05-05 PROCEDURE — G0145 SCR C/V CYTO,THINLAYER,RESCR: HCPCS | Performed by: NURSE PRACTITIONER

## 2020-05-05 PROCEDURE — 87591 N.GONORRHOEAE DNA AMP PROB: CPT | Performed by: NURSE PRACTITIONER

## 2020-05-05 PROCEDURE — 87624 HPV HI-RISK TYP POOLED RSLT: CPT | Performed by: NURSE PRACTITIONER

## 2020-05-05 PROCEDURE — 87491 CHLMYD TRACH DNA AMP PROBE: CPT | Performed by: NURSE PRACTITIONER

## 2020-05-05 PROCEDURE — 99214 OFFICE O/P EST MOD 30 MIN: CPT | Performed by: NURSE PRACTITIONER

## 2020-05-05 RX ORDER — MONTELUKAST SODIUM 10 MG/1
10 TABLET ORAL AT BEDTIME
Qty: 90 TABLET | Refills: 1 | Status: SHIPPED | OUTPATIENT
Start: 2020-05-05 | End: 2020-10-08

## 2020-05-05 RX ORDER — TRIAMCINOLONE ACETONIDE 1 MG/G
OINTMENT TOPICAL 2 TIMES DAILY
Qty: 30 G | Refills: 0 | Status: SHIPPED | OUTPATIENT
Start: 2020-05-05 | End: 2023-08-04

## 2020-05-05 ASSESSMENT — MIFFLIN-ST. JEOR: SCORE: 1266.69

## 2020-05-07 LAB
COPATH REPORT: NORMAL
PAP: NORMAL

## 2020-05-11 LAB
FINAL DIAGNOSIS: ABNORMAL
HPV HR 12 DNA CVX QL NAA+PROBE: POSITIVE
HPV16 DNA SPEC QL NAA+PROBE: NEGATIVE
HPV18 DNA SPEC QL NAA+PROBE: NEGATIVE
SPECIMEN DESCRIPTION: ABNORMAL
SPECIMEN SOURCE CVX/VAG CYTO: ABNORMAL

## 2020-05-11 NOTE — PROGRESS NOTES
5/6/05 pap LSIL  6/7/05 colposcopy. ECC- negative  11/28/05 pap ASCUS, + HR HPV (53)  6/14/06 pap ASCUS, + HR HPV (53)  7/13/06 colposcopy. bx- WNL.ECC- WNL  10/23/06 pap NIL  5/14/07 pap NIL  11/12/07 pap NIL  5/21/08 pap NIL  6/17/09 pap NIL  10/9/ 12 NIL pap. Plan: pap in 3 years.  6/18/15 NIL Pap, Neg HPV. Plan: Cotest due 5 years.   5/5/20 NIL pap, + HR HPV (not 16 or 18). Plan: cotest due 5/5/21 5/13/20 Pt notified by phone.

## 2020-05-18 ENCOUNTER — TRANSFERRED RECORDS (OUTPATIENT)
Dept: HEALTH INFORMATION MANAGEMENT | Facility: CLINIC | Age: 40
End: 2020-05-18

## 2020-08-31 ENCOUNTER — NURSE TRIAGE (OUTPATIENT)
Dept: FAMILY MEDICINE | Facility: CLINIC | Age: 40
End: 2020-08-31

## 2020-08-31 ENCOUNTER — OFFICE VISIT (OUTPATIENT)
Dept: FAMILY MEDICINE | Facility: CLINIC | Age: 40
End: 2020-08-31
Payer: COMMERCIAL

## 2020-08-31 VITALS
TEMPERATURE: 97.3 F | OXYGEN SATURATION: 99 % | WEIGHT: 141 LBS | DIASTOLIC BLOOD PRESSURE: 74 MMHG | BODY MASS INDEX: 25.38 KG/M2 | SYSTOLIC BLOOD PRESSURE: 122 MMHG | HEART RATE: 75 BPM | RESPIRATION RATE: 14 BRPM

## 2020-08-31 DIAGNOSIS — H00.11 CHALAZION OF RIGHT UPPER EYELID: Primary | ICD-10-CM

## 2020-08-31 PROCEDURE — 99213 OFFICE O/P EST LOW 20 MIN: CPT | Performed by: FAMILY MEDICINE

## 2020-08-31 RX ORDER — LEVOCETIRIZINE DIHYDROCHLORIDE 5 MG/1
5 TABLET, FILM COATED ORAL EVERY EVENING
COMMUNITY
End: 2021-09-22

## 2020-08-31 NOTE — TELEPHONE ENCOUNTER
"Patient with right eyelid swelling starting friday morning. No injury to the eye or irrigiation to the eye itself. No redness or pain. Swelling is located above the eyelash line. No drainage, eye watering, vision changes, or headaches. Patient has used benadryl over the weekend and warm compress as she thought it might be a stye or allergies. Recommend cool compress and appointment for evaluation. appt was scheduled for today in [facility].    Elizabeth Soto RN      1. ONSET: \"When did the swelling start?\" (e.g., minutes, hours, days)      Friday morning    2. LOCATION: \"What part of the eyelids is swollen?\"      Right eye, just above eyelash line    3. SEVERITY: \"How swollen is it?\"      Minor    4. ITCHING: \"Is there any itching?\" If so, ask: \"How much?\"   (Scale 1-10; mild, moderate or severe)      None/minimal    5. PAIN: \"Is the swelling painful to touch?\" If so, ask: \"How painful is it?\"   (Scale 1-10; mild, moderate or severe)      Minimal    6. FEVER: \"Do you have a fever?\" If so, ask: \"What is it, how was it measured, and when did it start?\"       None    7. CAUSE: \"What do you think is causing the swelling?\"      Unknown    8. RECURRENT SYMPTOM: \"Have you had eyelid swelling before?\" If so, ask: \"When was the last time?\" \"What happened that time?\"      No    9. OTHER SYMPTOMS: \"Do you have any other symptoms?\" (e.g., blurred vision, eye discharge, rash, runny nose)      None    10. PREGNANCY: \"Is there any chance you are pregnant?\" \"When was your last menstrual period?\"        NA    Additional Information    Negative: Unresponsive, passed out or very weak    Negative: Difficulty breathing or wheezing    Negative: [1] Difficulty swallowing or slurred speech AND [2] sudden onset    Negative: Sounds like a life-threatening emergency to the triager    Negative: Recent injury to the eye    Negative: Entire face is swollen    Negative: Sacs of clear fluid (blisters) on whites of eyes (allergic cysts)    Negative: " "Contact with pollen, other allergic substance or eyedrops    Negative: [1] Bee sting AND [2] within last 24 hours    Negative: Insect bite suspected    Negative: Sty suspected (small, painful red lump present on lid margin    Negative: Yellow or green discharge (pus) in the eye    Negative: Redness of white area (sclera) of eye(s)    Negative: [1] SEVERE eyelid swelling (i.e., shut or almost) AND [2] fever    Negative: [1] Eyelid (outer) is very red AND [2] fever    Negative: [1] Pregnant > 20 weeks AND [2] sudden weight gain (i.e., more than 3 lbs or 1.4 kg in one week)    Negative: Patient sounds very sick or weak to the triager    Negative: [1] SEVERE eyelid swelling (i.e., shut or almost) AND [2] involves both eyes      (Exception: itchy eyes, which  are probably an allergic reaction)    Negative: [1] SEVERE eyelid swelling on one side AND [2] red and painful (or tender to touch)    Negative: [1] SEVERE eyelid swelling on one side AND [2] sinus pain or pressure    Negative: [1] MILD swelling AND [2] fever    Negative: [1] Painful rash AND [2] multiple small blisters grouped together (i.e., dermatomal distribution or \"band\" or \"stripe\")    Negative: [1] SEVERE eyelid swelling (i.e., shut or almost) AND [2] involves both eyes AND [3] itchy    Negative: MODERATE-SEVERE eyelid swelling on one side  (Exception: due to a mosquito bite)    Negative: [1] MILD eyelid swelling (puffiness) AND [2] sinus pain or pressure    Negative: Eyelid is red and painful (or tender to touch)    Negative: Swelling of both lower legs (i.e., bilateral pedal edema)    [1] MILD eyelid swelling (puffiness) AND [2] persists > 3 days  (Exception: suspect mosquito bites)    Protocols used: EYE - SWELLING-A-AH      "

## 2020-08-31 NOTE — PROGRESS NOTES
Subjective     Barbara Yates is a 39 year old female who presents to clinic today for the following health issues:    HPI     Eye(s) Problem  Onset/Duration: Friday Morning  Description:   Location: YES- right eye  Pain: YES- dull pain when she blinks. If she touches it, it feels tender on the upper outside of her eye lid.   Redness: YES- yes. Especially in the morning  Accompanying Signs & Symptoms:  Discharge/mattering: no  Swelling: YES- worse in the morning.   Visual changes: no  Fever: no  Nasal Congestion: no  Bothered by bright lights: no  History:  Trauma: no. No new make up or face washes.   Foreign body exposure: no  Wearing contacts: no  Precipitating or alleviating factors: None  Therapies tried and outcome: tried taking 50 mg of benadryl and that did not help at all. Has tried antihistamine eye drops. Has tried eye drops for styes. Q-t with baby shampoo to try and wash it. Nothing seems to be making it better. This is her last effort for help.      Review of Systems   Constitutional, HEENT, cardiovascular, pulmonary, gi and gu systems are negative, except as otherwise noted.      Objective    /74   Pulse 75   Temp 97.3  F (36.3  C) (Temporal)   Resp 14   Wt 64 kg (141 lb)   SpO2 99%   BMI 25.38 kg/m    Body mass index is 25.38 kg/m .  Physical Exam   General: Appears well and in no acute distress.  HEENT: Chalazion present on inside, right upper eyelid, lateral aspect. Eyes otherwise grossly normal to inspection. Extraocular movements intact. Pupils equal, round, and reactive to light. Mucous membranes moist. No ulcers or lesions noted in the oropharynx.  Cardiovascular: Regular rate and rhythm, normal S1 and S2 without murmur. No extra heartsounds or friction rub. Radial pulses present and equal bilaterally.  Respiratory: Lungs clear to auscultation bilaterally. No wheezing or crackles. No prolonged expiration. Symmetrical chest rise.  Musculoskeletal: No gross extremity deformities.  No peripheral edema. Normal muscle bulk.    Labs    None    Assessment & Plan   1. Chalazion of right upper eyelid: Recommend warm compress and conservative cares. If continues should see ophthalmology for further treatment. Patient agreeable with plan.    Return in about 2 weeks (around 9/14/2020), or if symptoms worsen or fail to improve.    Rafael Gutierrez MD  [facility]    This chart is completed utilizing dictation software; typos and/or incorrect word substitutions may unintentionally occur.

## 2020-08-31 NOTE — PATIENT INSTRUCTIONS
Patient Education     Chalazion    A chalazion is a blocked, swollen oil gland in the eyelid. The eyelids have oil glands that lubricate the inside of the lids. If a gland becomes blocked, the oil builds up and causes the skin to swell.  A chalazion can take several weeks to grow. It can vary in size. It may appear on the inside or outside of the lid. In most cases, it occurs on the upper lid. The skin may be a normal color or may be red. A chalazion is usually not painful. But it can cause mild pain, soreness, sensitivity to light, eye discharge, and increased tearing.  A chalazion often lasts from a few weeks to a month. It often goes away on its own. A chalazion can be mistaken for a sty (infection of an oil gland) because they both appear on the eyelid.  Why a chalazion forms  It s often unclear why a chalazion appears. But a chalazion can develop when you have any of the following conditions:    Chronic blepharitis, when eyelids become irritated    Acne rosacea    Seborrhea    Tuberculosis    Viral infection  Home care  If your healthcare provider finds that a chalazion is infected, he or she may prescribe an antibiotic drop or ointment. Use the medicine as directed.    Wash your hands carefully with soap and warm water before and after caring for your eye. This is to help prevent infection.    Apply a warm, moist towel or compress for 10 to 15 minutes, 3 to 4 times a day. This will reduce the swelling and soften the hardened oils blocking the duct.    Massage the area gently after applying the warm compress to help drain the chalazion. Or follow your healthcare provider s directions.    Don t try to pop or squeeze the chalazion.    Don t wear eye makeup until the chalazion has healed. Or follow your healthcare provider s directions.    Don t wear contact lenses until the chalazion has healed. Or follow your healthcare provider s directions.    Once a day, with eyes closed, clean your eyelids with baby  shampoo or a moist eyelid cleansing wipe. This is to help reduce clogging of the duct, as well as help prevent a chalazion from returning. Ask your healthcare provider about products to clean your eyelids.  Follow-up care  Follow up with your healthcare provider, or as advised. If the chalazion does not heal in 4 weeks, you may be referred to a healthcare provider who specializes in eye care (an optometrist or ophthalmologist) for further evaluation and treatment. You may also be referred to an eye specialist if you have a large chalazion.  When to seek medical advice  Call your healthcare provider right away if any of these occur:    Chalazion returns to the same area repeatedly    Existing symptoms (such as pain, warmth, redness, and drainage) get worse    New symptoms appear, such as eye pain, warmth or redness around the eye, eye drainage, or both the upper and lower lids of the same eye swell    You have visual changes or blurred vision    You have a headache that persists    You have a fever of 100.4 F (38 C) or higher, or as directed by your healthcare provider  Date Last Reviewed: 3/1/2018    0573-4155 [employer]. [address] [place] [address], [place] [address]. All rights reserved. This information is not intended as a substitute for professional medical care. Always follow your healthcare professional's instructions.

## 2020-08-31 NOTE — TELEPHONE ENCOUNTER
Reason for call:  Patient reporting a symptom    Symptom or request: symptom    Duration (how long have symptoms been present): 4 days    Have you been treated for this before? No    Additional comments: Right eye lid is swollen.  Please call    Phone Number patient can be reached at:  Cell number on file:    Telephone Information:   Mobile [phone]       Best Time:  any    Can we leave a detailed message on this number:  YES    Call taken on 8/31/2020 at 9:58 AM by Lea Mcnamara
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
## 2020-10-08 ENCOUNTER — OFFICE VISIT (OUTPATIENT)
Dept: FAMILY MEDICINE | Facility: CLINIC | Age: 40
End: 2020-10-08
Payer: COMMERCIAL

## 2020-10-08 VITALS
HEART RATE: 87 BPM | TEMPERATURE: 97.7 F | SYSTOLIC BLOOD PRESSURE: 102 MMHG | BODY MASS INDEX: 25.29 KG/M2 | DIASTOLIC BLOOD PRESSURE: 64 MMHG | RESPIRATION RATE: 16 BRPM | WEIGHT: 140.5 LBS | OXYGEN SATURATION: 98 %

## 2020-10-08 DIAGNOSIS — R63.5 WEIGHT GAIN: ICD-10-CM

## 2020-10-08 DIAGNOSIS — D64.9 ANEMIA, UNSPECIFIED TYPE: ICD-10-CM

## 2020-10-08 DIAGNOSIS — R05.9 COUGH: ICD-10-CM

## 2020-10-08 DIAGNOSIS — G47.9 DISTURBANCE IN SLEEP BEHAVIOR: ICD-10-CM

## 2020-10-08 DIAGNOSIS — Z12.31 ENCOUNTER FOR SCREENING MAMMOGRAM FOR BREAST CANCER: ICD-10-CM

## 2020-10-08 DIAGNOSIS — J30.1 SEASONAL ALLERGIC RHINITIS DUE TO POLLEN: ICD-10-CM

## 2020-10-08 DIAGNOSIS — F41.1 GENERALIZED ANXIETY DISORDER: ICD-10-CM

## 2020-10-08 DIAGNOSIS — Z00.00 ROUTINE GENERAL MEDICAL EXAMINATION AT A HEALTH CARE FACILITY: Primary | ICD-10-CM

## 2020-10-08 LAB
ANION GAP SERPL CALCULATED.3IONS-SCNC: 2 MMOL/L (ref 3–14)
BASOPHILS # BLD AUTO: 0 10E9/L (ref 0–0.2)
BASOPHILS NFR BLD AUTO: 0.5 %
BUN SERPL-MCNC: 8 MG/DL (ref 7–30)
CALCIUM SERPL-MCNC: 8.9 MG/DL (ref 8.5–10.1)
CHLORIDE SERPL-SCNC: 107 MMOL/L (ref 94–109)
CHOLEST SERPL-MCNC: 171 MG/DL
CO2 SERPL-SCNC: 31 MMOL/L (ref 20–32)
CREAT SERPL-MCNC: 0.76 MG/DL (ref 0.52–1.04)
DEPRECATED CALCIDIOL+CALCIFEROL SERPL-MC: 25 UG/L (ref 20–75)
DIFFERENTIAL METHOD BLD: NORMAL
EOSINOPHIL # BLD AUTO: 0.1 10E9/L (ref 0–0.7)
EOSINOPHIL NFR BLD AUTO: 1.7 %
ERYTHROCYTE [DISTWIDTH] IN BLOOD BY AUTOMATED COUNT: 13.1 % (ref 10–15)
FERRITIN SERPL-MCNC: 38 NG/ML (ref 12–150)
GFR SERPL CREATININE-BSD FRML MDRD: >90 ML/MIN/{1.73_M2}
GLUCOSE SERPL-MCNC: 91 MG/DL (ref 70–99)
HBA1C MFR BLD: 5.2 % (ref 0–5.6)
HCT VFR BLD AUTO: 37.4 % (ref 35–47)
HDLC SERPL-MCNC: 49 MG/DL
HGB BLD-MCNC: 12 G/DL (ref 11.7–15.7)
IRON SATN MFR SERPL: 29 % (ref 15–46)
IRON SERPL-MCNC: 85 UG/DL (ref 35–180)
LDLC SERPL CALC-MCNC: 99 MG/DL
LYMPHOCYTES # BLD AUTO: 1.5 10E9/L (ref 0.8–5.3)
LYMPHOCYTES NFR BLD AUTO: 23.1 %
MCH RBC QN AUTO: 29.4 PG (ref 26.5–33)
MCHC RBC AUTO-ENTMCNC: 32.1 G/DL (ref 31.5–36.5)
MCV RBC AUTO: 92 FL (ref 78–100)
MONOCYTES # BLD AUTO: 0.6 10E9/L (ref 0–1.3)
MONOCYTES NFR BLD AUTO: 8.6 %
NEUTROPHILS # BLD AUTO: 4.3 10E9/L (ref 1.6–8.3)
NEUTROPHILS NFR BLD AUTO: 66.1 %
NONHDLC SERPL-MCNC: 122 MG/DL
PLATELET # BLD AUTO: 281 10E9/L (ref 150–450)
POTASSIUM SERPL-SCNC: 4 MMOL/L (ref 3.4–5.3)
RBC # BLD AUTO: 4.08 10E12/L (ref 3.8–5.2)
SODIUM SERPL-SCNC: 140 MMOL/L (ref 133–144)
TIBC SERPL-MCNC: 294 UG/DL (ref 240–430)
TRIGL SERPL-MCNC: 113 MG/DL
TSH SERPL DL<=0.005 MIU/L-ACNC: 1.27 MU/L (ref 0.4–4)
WBC # BLD AUTO: 6.5 10E9/L (ref 4–11)

## 2020-10-08 PROCEDURE — 83540 ASSAY OF IRON: CPT | Performed by: NURSE PRACTITIONER

## 2020-10-08 PROCEDURE — 83036 HEMOGLOBIN GLYCOSYLATED A1C: CPT | Performed by: NURSE PRACTITIONER

## 2020-10-08 PROCEDURE — 80061 LIPID PANEL: CPT | Performed by: NURSE PRACTITIONER

## 2020-10-08 PROCEDURE — 82728 ASSAY OF FERRITIN: CPT | Performed by: NURSE PRACTITIONER

## 2020-10-08 PROCEDURE — 84443 ASSAY THYROID STIM HORMONE: CPT | Performed by: NURSE PRACTITIONER

## 2020-10-08 PROCEDURE — 90686 IIV4 VACC NO PRSV 0.5 ML IM: CPT | Performed by: NURSE PRACTITIONER

## 2020-10-08 PROCEDURE — 83550 IRON BINDING TEST: CPT | Performed by: NURSE PRACTITIONER

## 2020-10-08 PROCEDURE — 99396 PREV VISIT EST AGE 40-64: CPT | Mod: 25 | Performed by: NURSE PRACTITIONER

## 2020-10-08 PROCEDURE — 80048 BASIC METABOLIC PNL TOTAL CA: CPT | Performed by: NURSE PRACTITIONER

## 2020-10-08 PROCEDURE — 82306 VITAMIN D 25 HYDROXY: CPT | Performed by: NURSE PRACTITIONER

## 2020-10-08 PROCEDURE — 90471 IMMUNIZATION ADMIN: CPT | Performed by: NURSE PRACTITIONER

## 2020-10-08 PROCEDURE — 85025 COMPLETE CBC W/AUTO DIFF WBC: CPT | Performed by: NURSE PRACTITIONER

## 2020-10-08 PROCEDURE — 99214 OFFICE O/P EST MOD 30 MIN: CPT | Mod: 25 | Performed by: NURSE PRACTITIONER

## 2020-10-08 RX ORDER — ALBUTEROL SULFATE 90 UG/1
AEROSOL, METERED RESPIRATORY (INHALATION)
Qty: 17 G | Refills: 1 | Status: SHIPPED | OUTPATIENT
Start: 2020-10-08 | End: 2020-11-12

## 2020-10-08 RX ORDER — VENLAFAXINE HYDROCHLORIDE 150 MG/1
300 CAPSULE, EXTENDED RELEASE ORAL DAILY
Qty: 180 CAPSULE | Refills: 1 | Status: SHIPPED | OUTPATIENT
Start: 2020-10-08 | End: 2021-09-20

## 2020-10-08 RX ORDER — TRAZODONE HYDROCHLORIDE 50 MG/1
50 TABLET, FILM COATED ORAL AT BEDTIME
Qty: 90 TABLET | Refills: 1 | Status: SHIPPED | OUTPATIENT
Start: 2020-10-08 | End: 2021-09-20

## 2020-10-08 RX ORDER — MONTELUKAST SODIUM 10 MG/1
10 TABLET ORAL AT BEDTIME
Qty: 90 TABLET | Refills: 1 | Status: SHIPPED | OUTPATIENT
Start: 2020-10-08 | End: 2021-05-18

## 2020-10-08 ASSESSMENT — ENCOUNTER SYMPTOMS
FREQUENCY: 0
JOINT SWELLING: 0
SHORTNESS OF BREATH: 0
NERVOUS/ANXIOUS: 0
DIARRHEA: 0
ARTHRALGIAS: 1
PALPITATIONS: 0
HEADACHES: 1
HEARTBURN: 0
FEVER: 0
NAUSEA: 0
MYALGIAS: 1
WEAKNESS: 0
BREAST MASS: 0
HEMATURIA: 0
ABDOMINAL PAIN: 0
PARESTHESIAS: 0
HEMATOCHEZIA: 0
DYSURIA: 0
DIZZINESS: 0
SORE THROAT: 0
EYE PAIN: 0
COUGH: 0
CONSTIPATION: 1
CHILLS: 0

## 2020-10-08 ASSESSMENT — ANXIETY QUESTIONNAIRES
4. TROUBLE RELAXING: NOT AT ALL
1. FEELING NERVOUS, ANXIOUS, OR ON EDGE: NOT AT ALL
7. FEELING AFRAID AS IF SOMETHING AWFUL MIGHT HAPPEN: NOT AT ALL
2. NOT BEING ABLE TO STOP OR CONTROL WORRYING: NOT AT ALL
3. WORRYING TOO MUCH ABOUT DIFFERENT THINGS: NOT AT ALL
7. FEELING AFRAID AS IF SOMETHING AWFUL MIGHT HAPPEN: NOT AT ALL
5. BEING SO RESTLESS THAT IT IS HARD TO SIT STILL: NOT AT ALL
6. BECOMING EASILY ANNOYED OR IRRITABLE: SEVERAL DAYS
GAD7 TOTAL SCORE: 1

## 2020-10-08 ASSESSMENT — PATIENT HEALTH QUESTIONNAIRE - PHQ9
SUM OF ALL RESPONSES TO PHQ QUESTIONS 1-9: 7
SUM OF ALL RESPONSES TO PHQ QUESTIONS 1-9: 7
10. IF YOU CHECKED OFF ANY PROBLEMS, HOW DIFFICULT HAVE THESE PROBLEMS MADE IT FOR YOU TO DO YOUR WORK, TAKE CARE OF THINGS AT HOME, OR GET ALONG WITH OTHER PEOPLE: NOT DIFFICULT AT ALL

## 2020-10-08 ASSESSMENT — PAIN SCALES - GENERAL: PAINLEVEL: MODERATE PAIN (4)

## 2020-10-09 ASSESSMENT — PATIENT HEALTH QUESTIONNAIRE - PHQ9: SUM OF ALL RESPONSES TO PHQ QUESTIONS 1-9: 7

## 2020-10-09 ASSESSMENT — ANXIETY QUESTIONNAIRES: GAD7 TOTAL SCORE: 1

## 2020-11-02 ENCOUNTER — TRANSFERRED RECORDS (OUTPATIENT)
Dept: HEALTH INFORMATION MANAGEMENT | Facility: CLINIC | Age: 40
End: 2020-11-02

## 2020-11-03 ENCOUNTER — VIRTUAL VISIT (OUTPATIENT)
Dept: FAMILY MEDICINE | Facility: OTHER | Age: 40
End: 2020-11-03
Payer: COMMERCIAL

## 2020-11-03 DIAGNOSIS — Z20.822 EXPOSURE TO COVID-19 VIRUS: Primary | ICD-10-CM

## 2020-11-03 DIAGNOSIS — R05.9 COUGH: ICD-10-CM

## 2020-11-03 PROCEDURE — 99213 OFFICE O/P EST LOW 20 MIN: CPT | Mod: 95 | Performed by: PHYSICIAN ASSISTANT

## 2020-11-03 NOTE — PROGRESS NOTES
"Barbara Yates is a 40 year old female who is being evaluated via a billable telephone visit.      The patient has been notified of following:     \"This telephone visit will be conducted via a call between you and your physician/provider. We have found that certain health care needs can be provided without the need for a physical exam.  This service lets us provide the care you need with a short phone conversation.  If a prescription is necessary we can send it directly to your pharmacy.  If lab work is needed we can place an order for that and you can then stop by our lab to have the test done at a later time.    Telephone visits are billed at different rates depending on your insurance coverage. During this emergency period, for some insurers they may be billed the same as an in-person visit.  Please reach out to your insurance provider with any questions.    If during the course of the call the physician/provider feels a telephone visit is not appropriate, you will not be charged for this service.\"    Patient has given verbal consent for Telephone visit?  Yes    What phone number would you like to be contacted at? [phone]    How would you like to obtain your AVS? [place]    Subjective     Barbara Yates is a 40 year old female who presents via phone visit today for the following health issues:    HPI      COVID exposure     Exposed over the weekend. Pt is having cough, congestion and drainage.       Acute Illness  Acute illness concerns: Cough, nasal drainage   Onset/Duration: over the weekend, maybe even Friday.   Symptoms:  Fever: no  Chills/Sweats: no  Headache (location?): no  Sinus Pressure: Slightly  Conjunctivitis:  no  Ear Pain: no  Rhinorrhea: Yes, post nasal drainage.  Congestion: no  Sore Throat: no  Cough: YES - thinks from the drainage, feels like it is more to clear the phlegm in her throat.   Wheeze: no, no shortness of breath.   Decreased Appetite: Slightly  Nausea: no  Vomiting: " no  Diarrhea: no  Dysuria/Freq.: no  Dysuria or Hematuria: no  Rashes: no  Loss of taste/smell: Not with smell, taste is questionable  Fatigue/Achiness: no  Sick/Strep Exposure: Exposure to COVID over the weekend - nephew's brother, close contact. They were asymptomatic at that time.  He had a fever yesterday and a bad sore throat, the uncle tested positive.   Therapies tried and outcome: None    Review of Systems   Constitutional, HEENT, cardiovascular, pulmonary, gi and gu systems are negative, except as otherwise noted.       Objective          Vitals:  No vitals were obtained today due to virtual visit.    healthy, alert and no distress  PSYCH: Alert and oriented times 3; coherent speech, normal   rate and volume, able to articulate logical thoughts, able   to abstract reason, no tangential thoughts, no hallucinations   or delusions  Her affect is normal  RESP: No cough, no audible wheezing, able to talk in full sentences  Remainder of exam unable to be completed due to telephone visits    No results found for this or any previous visit (from the past 24 hour(s)).        Assessment/Plan:    Assessment & Plan     Diagnoses and all orders for this visit:    Exposure to COVID-19 virus  -     Symptomatic COVID-19 Virus (Coronavirus) by PCR; Future    Cough  -     Symptomatic COVID-19 Virus (Coronavirus) by PCR; Future            She has mild symptoms that could be consistent with COVID and close contact with COVID positive individual, recommended testing, she otherwise sounds stable and will monitor symptoms.  Discussed symptomatic treatment, isolation versus quarantine.  For now isolation until COVID result is back and if negative and symptoms improving discussed she could still become positive for COVID and will monitor for symptoms and practice standard precautions.     Return in about 1 week (around 11/10/2020) for If not improving, sooner if worse or new concerns.     Options for treatment and follow-up care  were reviewed with the patient and/or guardian. Patient and/or guardian engaged in the decision making process and verbalized understanding of the options discussed and agreed with the final plan.     Eros Sauer PA-C  [facility]    Phone call duration:  6:51 minutes

## 2020-11-06 DIAGNOSIS — Z20.822 EXPOSURE TO COVID-19 VIRUS: ICD-10-CM

## 2020-11-06 DIAGNOSIS — R05.9 COUGH: ICD-10-CM

## 2020-11-06 LAB
SARS-COV-2 RNA SPEC QL NAA+PROBE: ABNORMAL
SPECIMEN SOURCE: ABNORMAL

## 2020-11-06 PROCEDURE — U0003 INFECTIOUS AGENT DETECTION BY NUCLEIC ACID (DNA OR RNA); SEVERE ACUTE RESPIRATORY SYNDROME CORONAVIRUS 2 (SARS-COV-2) (CORONAVIRUS DISEASE [COVID-19]), AMPLIFIED PROBE TECHNIQUE, MAKING USE OF HIGH THROUGHPUT TECHNOLOGIES AS DESCRIBED BY CMS-2020-01-R: HCPCS | Performed by: PHYSICIAN ASSISTANT

## 2020-11-09 ENCOUNTER — NURSE TRIAGE (OUTPATIENT)
Dept: FAMILY MEDICINE | Facility: CLINIC | Age: 40
End: 2020-11-09

## 2020-11-09 NOTE — TELEPHONE ENCOUNTER
Patient was transferred to triage.     Patient was diagnosed with COVID on 11/06/20.     Patient states that she currently has a dry cough, but feels like she has to take shorter breaths verses deeper breaths otherwise she has a coughing attack. She does not feel and rattling or wheezing in her chest. During conversation the patient did not appear to sound like she was struggling to breath. Patient has been taking her Albuterol inhaler.     Patient complains of a globally located headache. She just took 1000mg of Tylenol and has been taking previously, but the pain has not subsided. Rates her pain at a 10/10 and is throbbing. During conversation the patient sounded weak. Patient states she feels light head/dizzy with movement.   Patient also has body aches and has been sweating profusely. She is unsure if she has a fever. Has been taking Nyquil and Tylenol.     PLAN:   Patient advised to go to ED for further evaluation due to COVID symptoms. Huddled with provider.     Rome Mayberry RN  November 9, 2020      Additional Information    Negative: Difficult to awaken or acting confused (e.g., disoriented, slurred speech)    Negative: Weakness of the face, arm or leg on one side of the body and new onset    Negative: Numbness of the face, arm or leg on one side of the body and new onset    Negative: Loss of speech or garbled speech and new onset    Negative: Passed out (i.e., fainted, collapsed and was not responding)    Negative: Sounds like a life-threatening emergency to the triager    Negative: Followed a head injury within last 3 days    Negative: Traumatic Brain Injury (TBI) is suspected    Negative: Sinus pain of forehead and yellow or green nasal discharge    Negative: Pregnant    Negative: Unable to walk without falling    Negative: Stiff neck (can't touch chin to chest)    Negative: Possibility of carbon monoxide exposure    SEVERE headache, states 'worst headache' of life    Protocols used:  HEADACHE-A-[place]

## 2020-11-09 NOTE — TELEPHONE ENCOUNTER
Reason for call:  Patient reporting a symptom    Symptom or request: Covid 19 positive (cough and shortness of breath)    Duration (how long have symptoms been present): unknown    Have you been treated for this before? Yes    Additional comments: Patient would like a nebulizer to help with her breathing because her inhalers aren't working. Advised patient that she would need a visit but would like to speak with a nurse to see if she should just got to the ED    Phone Number patient can be reached at:  Home number on file [phone] (home)    Best Time:  any    Can we leave a detailed message on this number:  YES    Call taken on 11/9/2020 at 7:37 AM by Yuly Orona

## 2020-11-12 ENCOUNTER — VIRTUAL VISIT (OUTPATIENT)
Dept: FAMILY MEDICINE | Facility: CLINIC | Age: 40
End: 2020-11-12
Payer: COMMERCIAL

## 2020-11-12 DIAGNOSIS — U07.1 2019 NOVEL CORONAVIRUS DISEASE (COVID-19): Primary | ICD-10-CM

## 2020-11-12 PROCEDURE — 99213 OFFICE O/P EST LOW 20 MIN: CPT | Mod: TEL | Performed by: FAMILY MEDICINE

## 2020-11-12 RX ORDER — BENZONATATE 100 MG/1
100 CAPSULE ORAL 3 TIMES DAILY PRN
Qty: 45 CAPSULE | Refills: 0 | Status: SHIPPED | OUTPATIENT
Start: 2020-11-12 | End: 2021-09-22

## 2020-11-12 RX ORDER — ALBUTEROL SULFATE 90 UG/1
AEROSOL, METERED RESPIRATORY (INHALATION)
Qty: 17 G | Refills: 0 | Status: SHIPPED | OUTPATIENT
Start: 2020-11-12 | End: 2021-05-18

## 2020-11-12 RX ORDER — CODEINE PHOSPHATE AND GUAIFENESIN 10; 100 MG/5ML; MG/5ML
1-2 SOLUTION ORAL EVERY 6 HOURS PRN
Qty: 118 ML | Refills: 0 | Status: SHIPPED | OUTPATIENT
Start: 2020-11-12 | End: 2020-11-16

## 2020-11-12 NOTE — PROGRESS NOTES
"Barbara Yates is a 40 year old female who is being evaluated via a billable telephone visit.      The patient has been notified of following:     \"This telephone visit will be conducted via a call between you and your physician/provider. We have found that certain health care needs can be provided without the need for a physical exam.  This service lets us provide the care you need with a short phone conversation.  If a prescription is necessary we can send it directly to your pharmacy.  If lab work is needed we can place an order for that and you can then stop by our lab to have the test done at a later time.    Telephone visits are billed at different rates depending on your insurance coverage. During this emergency period, for some insurers they may be billed the same as an in-person visit.  Please reach out to your insurance provider with any questions.    If during the course of the call the physician/provider feels a telephone visit is not appropriate, you will not be charged for this service.\"    Patient has given verbal consent for Telephone visit?  Yes    What phone number would you like to be contacted at? [phone]    How would you like to obtain your AVS? Willow Saucedo     Barbara Yates is a 40 year old female who presents via phone visit today for the following health issues:    HPI      Tested positive for COVID on 11/6/20. Still has severe cough. Has been needing to use her Albuterol inhaler 2-3 times daily and it takes about 4 puffs at a time to get any relief.     Patient reports testing positive on 11/6/2020.  She is having a severe cough.  She does have an albuterol inhaler but gets minimal relief from her cough with this.  She does note having to take deeper breaths at times when talking for extended periods of times, this triggers a cough.  She does not otherwise feel significantly short of breath.  She would go to the emergency department if symptoms worsen.  She has " already tried [place] and over-the-counter TheraFlu containing dextromethorphan.    She is also wondering if a nebulizer would be helpful.    She has no other questions or concerns at this time.    Review of Systems   Constitutional, HEENT, lymph, MSK, cardiovascular, pulmonary, gi systems are negative, except as otherwise noted.       Objective      Vitals:  No vitals were obtained today due to virtual visit.    healthy, alert and no distress  PSYCH: Alert and oriented times 3; coherent speech, normal   rate and volume, able to articulate logical thoughts, able   to abstract reason, no tangential thoughts, no hallucinations   or delusions  Her affect is normal  RESP: Cough. No audible wheezing, able to talk in full sentences  Remainder of exam unable to be completed due to telephone visits    Labs: None, previous positive Covid test reviewed.        Assessment & Plan   1. 2019 novel coronavirus disease (COVID-19): If symptoms and shortness of breath continue to worsen recommend going emergency department.  Will give Tessalon and cough syrup with codeine.  Encouraged albuterol inhaler use but discouraged finding a nebulizer as this can aerosolized the virus.  Patient is agreeable with plan.  Understanding when to seek higher cares.  - benzonatate (TESSALON) 100 MG capsule; Take 1 capsule (100 mg) by mouth 3 times daily as needed for cough  Dispense: 45 capsule; Refill: 0  - guaiFENesin-codeine (ROBITUSSIN AC) 100-10 MG/5ML solution; Take 5-10 mLs by mouth every 6 hours as needed for cough  Dispense: 118 mL; Refill: 0  - albuterol (PROAIR HFA) 108 (90 Base) MCG/ACT inhaler; USE 2 INHALATIONS EVERY 6 HOURS  Dispense: 17 g; Refill: 0    Return in about 1 week (around 11/19/2020), or if symptoms worsen or fail to improve.    Rafael Gutierrez MD  [facility]     This chart is completed utilizing dictation software; typos and/or incorrect word substitutions may unintentionally occur.       Phone call duration:  6 minutes

## 2020-11-12 NOTE — PATIENT INSTRUCTIONS
Patient Education     [employer] Video Sheets  Caring for Someone Who Has COVID-19  Your loved one has a COVID-19 infection, and you're caring for them at home. This video will show you some things to do as you provide care.  To watch the video:  Scan the QR code  Using your mobile device, scan the following code:       OR  Go to the website:  [email or web address]  Enter the prescription code:   [id number]     2020 [email or web address].

## 2020-11-16 ENCOUNTER — VIRTUAL VISIT (OUTPATIENT)
Dept: FAMILY MEDICINE | Facility: CLINIC | Age: 40
End: 2020-11-16
Payer: COMMERCIAL

## 2020-11-16 DIAGNOSIS — U07.1 2019 NOVEL CORONAVIRUS DISEASE (COVID-19): Primary | ICD-10-CM

## 2020-11-16 PROCEDURE — 99213 OFFICE O/P EST LOW 20 MIN: CPT | Mod: TEL | Performed by: FAMILY MEDICINE

## 2020-11-16 RX ORDER — CODEINE PHOSPHATE AND GUAIFENESIN 10; 100 MG/5ML; MG/5ML
1-2 SOLUTION ORAL EVERY 6 HOURS PRN
Qty: 118 ML | Refills: 0 | Status: SHIPPED | OUTPATIENT
Start: 2020-11-16 | End: 2021-09-22

## 2020-11-16 RX ORDER — PREDNISONE 20 MG/1
40 TABLET ORAL DAILY
Qty: 10 TABLET | Refills: 0 | Status: SHIPPED | OUTPATIENT
Start: 2020-11-16 | End: 2020-11-21

## 2020-11-16 NOTE — PATIENT INSTRUCTIONS
"  Patient Education     Coronavirus Disease 2019 (COVID-19): Prevention  The best prevention is to have no contact with the SARS-CoV-2 virus. There is no vaccine yet.  It s important to keep up on vaccines for other illnesses, including flu and pnuemonia. This is even more true if you re at higher risk for severe illness. Everyone 6 months and older should get a yearly flu vaccine, with rare exceptions.  Cancel travel and other outings  Stay informed about COVID-19 in your area. Follow local instructions. School, sporting events and other activities may be cancelled. You may need to avoid public gatherings.  Stay at least 6 feet away from others as much as possible. This is called \"social distancing.\" You may also be asked to stay at home and isolate yourself. You may hear terms like \"self-isolate, \"quarantine,\"  stay at home,   shelter in place,  and  lockdown.   Don t travel to areas with a COVID-19 outbreak. Don t go on a cruise. It s best to cancel any non-essential travel right now. For the most up-to-date travel advisories, visit the CDC website at www.cdc.gov/coronavirus/2019-ncov/travelers.  When you re at home    Wash your hands often. Use soap and clean, running water for at least 20 seconds. Or, use hand  that has at least 60% alcohol.    Don t touch your eyes, nose, or mouth unless you have clean hands.    Don t kiss someone who is sick.    If you need to cough or sneeze, do it into a tissue. Then throw the tissue into the trash. If you don t have tissues, cough or sneeze into the bend of your elbow.    Try to avoid \"high-touch\" surfaces, like doorknobs, handles, light switches, desks, printers, phones, kitchen counters, tables and bathroom surfaces. Clean these often with disinfectant (read the label for instructions). For cleaning tips, go to www.cdc.gov/coronavirus/2019-ncov/prepare/cleaning-disinfection.html.    Check your home supplies. Consider keeping a 2-week supply of medicines, food, " "and other needed household items.    Make a plan for childcare, work, and ways to stay in touch with others. Know who will help you if you get sick.    Don t be around people who are sick.    Don t share eating or drinking utensils with sick people.    Wash your hands after touching animals. Don t touch animals that may be sick.    If you leave home      Stay at least 6 feet away from all people.    Try to avoid \"high-touch\" public surfaces, like doorknobs, handles, and light switches. If you need to touch these, clean them first with a disinfecting wipe. Or, touch them using a tissue or paper towel.    Use hand  often. Make sure it has at least 60% alcohol.    Don t touch your eyes, nose, or mouth unless you have clean hands.    If you need to cough or sneeze, do it into a tissue. Then, throw the tissue into the trash. If you don t have tissues, cough or sneeze into the bend of your elbow.    Avoid public gatherings. If you do attend public gatherings, stay at least 6 feet away from others. Don t share food, water bottles, or other personal items.    Anyone over age 2 should wear a cloth face mask in public, especially when it s hard to socially distance. This includes public transit, public protests and marches, and crowded stores, bars, and restaurants.  ? The mask should cover both your nose and mouth. You may need to make your own mask using a bandana, T-shirt, or other cloth. See the CDC s instructions on how to make a mask.  ? Face masks are most likely to reduce the spread of COVID-19 when they are widely used by people who are out in the public.    Certain people should not wear a face mask. These include:  ? Children under 2 years old  ? Anyone with a health, developmental, or mental health condition that can be made worse by wearing a mask  ? Anyone who is unconscious or unable to remove the face covering without help. See the CDC s guidance on who should not wear a face mask.  If you are at a " work site    Follow all of the instructions above.    If you feel sick in any way, go home and stay home.    Tell your manager if you are well but live with someone who has COVID-19.    Wash your hands often with soap and clean, running water for at least 20 seconds. Or, use hand  with at least 60% alcohol.    Don t shake hands. Don t have in-person meetings. (Meet over phone or video, if possible.)    Don t use other people s desks, offices, phones or equipment (pens, keyboards, eating or drinking utensils, etc.), if possible.    Use office jillian one person at a time. Don t share coffee, tea or food in the office. Don t have meals in groups.    Wear a mask over your nose and mouth.    Clean work surfaces often with disinfectant. These include desks, photocopiers, printers, phones, kitchen counters, fridge door handles, bathroom surfaces, and others.    If you ve been around someone with COVID-19  In the past 14 days, if you ve been around someone who has (or may have) COVID-19:    Contact your care team to ask for advice. Follow all instructions. You may need to stay home and limit your activities for up to 2 weeks.    Take your temperature every morning and evening for at least 14 days. This is to check for fever. Keep a record of the readings.    Watch for symptoms of the virus. (See the CDC s symptom .) If you have symptoms, contact your care team.    Stay home if you re sick for any reason. If you need to go to a clinic or hospital, call ahead to let them know you re coming.    If you ve had COVID-19 in the last 3 months, but now you re symptom-free, your limits are different: You don t need to self-isolate. You don t need to be re-tested for COVID-19, unless you have symptoms of the virus. (If you do develop symptoms, stay home.)    If you had COVID-19 more than 3 months ago, and you ve been exposed again: Treat it like you ve never had COVID-19. Stay home, limit your contact with others,  call your care team, and check for symptoms.  When to contact your care team  Call your care team if you think you have COVID-19 symptoms. These can include fever, cough, trouble breathing, body aches, headaches, chills or repeated shaking with chills, sore throat, loss of taste or smell, or diarrhea (loose, watery poops). Don t go to a clinic or hospital before speaking to your care team.  Last modified date: 10/13/2020  [employer] last reviewed this educational content on 4/1/2020  This information has been modified by your health care provider with permission from the publisher.    6119-4299 [employer]. [address] [place] [address], [place] [address]. All rights reserved. This information is not intended as a substitute for professional medical care. Always follow your healthcare professional s instructions.

## 2020-11-16 NOTE — PROGRESS NOTES
"  Barbara Yates is a 40 year old female who is being evaluated via a billable telephone visit.      The patient has been notified of following:     \"This telephone visit will be conducted via a call between you and your physician/provider. We have found that certain health care needs can be provided without the need for a physical exam.  This service lets us provide the care you need with a short phone conversation.  If a prescription is necessary we can send it directly to your pharmacy.  If lab work is needed we can place an order for that and you can then stop by our lab to have the test done at a later time.    Telephone visits are billed at different rates depending on your insurance coverage. During this emergency period, for some insurers they may be billed the same as an in-person visit.  Please reach out to your insurance provider with any questions.    If during the course of the call the physician/provider feels a telephone visit is not appropriate, you will not be charged for this service.\"    Patient has given verbal consent for Telephone visit?  Yes    What phone number would you like to be contacted at?  [phone]    How would you like to obtain your AVS? Terryt    Lewis     Barbara Yates is a 40 year old female who presents via phone visit today for the following health issues:    HPI       Concern for COVID-19  About how many days ago did these symptoms start? Positive for COVID since 11/06  Is this your first visit for this illness? Yes  Do you have a fever or chills? Yes, I felt feverish or had chills  Are you having new or worsening difficulty breathing? Yes   Please describe what kind of difficulty you are having breathing: SOB from coughing   Do you have new or worsening cough? Yes, I am coughing up mucus.  Have you had any new or unexplained body aches? YES    Have you experienced any of the following NEW symptoms?    Headache: YES    Sore throat: YES    Loss of taste or " smell: YES    Chest pain: YES- from the coughing     Diarrhea: No    Rash: No  What treatments have you tried? Robitussin AC   Who do you live with? Living with family members.   Are you, or a household member, a healthcare worker or a ? YES  Do you live in a nursing home, group home, or shelter? No  Do you have a way to get food/medications if quarantined? Yes, I have a friend or family member who can help me.          Patient was previously seen last week on 11/12/2020.  She is known to be positive for coronavirus.  After that visit she did go to the emergency department where she was found to have a normal chest x-ray and normal O2 saturations.  She did have a right ear effusion given wait-and-see amoxicillin prescription.  She states her symptoms are roughly the same.  She did receive mild relief with the Tessalon Perles and cough syrup with codeine and is requesting refills for this.  She still however has not slept and is not feeling any better.  She is wondering what else she can do.    She has no other questions or concerns today.    Review of Systems   Constitutional, HEENT, lymph, Derm, MSK, cardiovascular, pulmonary, gi and gu systems are negative, except as otherwise noted.       Objective      Vitals:  No vitals were obtained today due to virtual visit.    healthy, alert and no distress  PSYCH: Alert and oriented times 3; coherent speech, normal   rate and volume, able to articulate logical thoughts, able   to abstract reason, no tangential thoughts, no hallucinations   or delusions  Her affect is normal  RESP: No cough, no audible wheezing, able to talk in full sentences  Remainder of exam unable to be completed due to telephone visits    Labs: None        Assessment & Plan   1. 2019 novel coronavirus disease (COVID-19): Patient known to be Covid positive.  Ongoing cough and having difficulty sleeping.  Will refill cough syrup with codeine.  Also give prednisone to help with the cough is  nothing else seems to have been working.  Did discuss this may worsen insomnia symptoms.  She is agreeable with this plan.  She also go  the amoxicillin previously prescribed by the emergency department for right ear effusion.  - predniSONE (DELTASONE) 20 MG tablet; Take 2 tablets (40 mg) by mouth daily for 5 days  Dispense: 10 tablet; Refill: 0  - guaiFENesin-codeine (ROBITUSSIN AC) 100-10 MG/5ML solution; Take 5-10 mLs by mouth every 6 hours as needed for cough  Dispense: 118 mL; Refill: 0     Return in about 1 week (around 11/23/2020), or if symptoms worsen or fail to improve.    Rafael Gutierrez MD  [facility]     This chart is completed utilizing dictation software; typos and/or incorrect word substitutions may unintentionally occur.      Phone call duration:  5 minutes

## 2020-12-20 ENCOUNTER — TRANSFERRED RECORDS (OUTPATIENT)
Dept: HEALTH INFORMATION MANAGEMENT | Facility: CLINIC | Age: 40
End: 2020-12-20

## 2020-12-22 ENCOUNTER — VIRTUAL VISIT (OUTPATIENT)
Dept: FAMILY MEDICINE | Facility: CLINIC | Age: 40
End: 2020-12-22
Payer: COMMERCIAL

## 2020-12-22 DIAGNOSIS — J40 BRONCHITIS: Primary | ICD-10-CM

## 2020-12-22 PROCEDURE — 99214 OFFICE O/P EST MOD 30 MIN: CPT | Mod: GT | Performed by: NURSE PRACTITIONER

## 2020-12-22 RX ORDER — PREDNISONE 20 MG/1
20 TABLET ORAL
COMMUNITY
Start: 2020-12-21 | End: 2021-09-22

## 2020-12-22 NOTE — PROGRESS NOTES
"Barbara Yates is a 40 year old female who is being evaluated via a billable video visit.      The patient has been notified of following:     \"This video visit will be conducted via a call between you and your physician/provider. We have found that certain health care needs can be provided without the need for an in-person physical exam.  This service lets us provide the care you need with a video conversation.  If a prescription is necessary we can send it directly to your pharmacy.  If lab work is needed we can place an order for that and you can then stop by our lab to have the test done at a later time.    Video visits are billed at different rates depending on your insurance coverage.  Please reach out to your insurance provider with any questions.    If during the course of the call the physician/provider feels a video visit is not appropriate, you will not be charged for this service.\"    Patient has given verbal consent for Video visit? Yes  How would you like to obtain your AVS? MyChart  If you are dropped from the video visit, the video invite should be resent to: Text to cell phone: [phone]  Will anyone else be joining your video visit? No    Subjective     Barbara Yates is a 40 year old female who presents today via video visit for the following health issues:    HPI     ED/UC Followup:    Facility:  [facility]  Date of visit: 12/20/20  Reason for visit: chest pain, coughing, sob   Current Status: cough is still rough. Chest pain is only with deep breaths.      On day 3 of prednisone.   Night is worse.   Taking Tessalon perles, and robitussin AC.    Improving-- only a little bit.   Any movement made it difficult to breathe prior to visit.   Is taking albuterol. Is taking up to 6 puffs at a time.       Fever-no  PO-taking in fluids. Colder water feels better.       Video Start Time: 1011    Not using albuterol every day- was trying to save it.     Review of Systems "   Constitutional, HEENT, cardiovascular, pulmonary, gi and gu systems are negative, except as otherwise noted.      Objective           Vitals:  No vitals were obtained today due to virtual visit.    Physical Exam     GENERAL: alert, no distress, fatigued and non-toxic  EYES: Eyes grossly normal to inspection.  No discharge or erythema, or obvious scleral/conjunctival abnormalities.  RESP: dry cough noted- moderate. Speaking in sentences, no audible wheezes, breathing is calm  SKIN: Visible skin clear. No significant rash, abnormal pigmentation or lesions.  NEURO: Cranial nerves grossly intact.  Mentation and speech appropriate for age.  PSYCH: Mentation appears normal, affect normal/bright, judgement and insight intact, normal speech and appearance well-groomed.      ED visit reviewed.         Assessment & Plan     Barbara was seen today for hospital f/u.    Diagnoses and all orders for this visit:    Bronchitis            Discussed bronchitis.  Need to use albuterol more routinely.  Advised less puffs to avoid therapeutic ceiling and adverse side effects.  Honey on the spoon for cough.  Push fluids.  Increase nutrition.  Patient is staying home for the holiday.  No current fever.  Warning signs discussed, call if worse.    Return in about 4 months (around 4/22/2021) for Virtual Visit.    MANJULA Guerrero CNP  [facility]      Video-Visit Details    Type of service:  Video Visit    Video End Time:10:26 AM    Originating Location (pt. Location): Home    Distant Location (provider location):  [facility]     Platform used for Video Visit: Oncodesign

## 2021-01-05 ENCOUNTER — MEDICAL CORRESPONDENCE (OUTPATIENT)
Dept: HEALTH INFORMATION MANAGEMENT | Facility: CLINIC | Age: 41
End: 2021-01-05

## 2021-01-05 ENCOUNTER — TRANSFERRED RECORDS (OUTPATIENT)
Dept: HEALTH INFORMATION MANAGEMENT | Facility: CLINIC | Age: 41
End: 2021-01-05

## 2021-01-05 DIAGNOSIS — F33.1 MAJOR DEPRESSIVE DISORDER, RECURRENT EPISODE, MODERATE (H): Primary | ICD-10-CM

## 2021-02-01 ENCOUNTER — NURSE TRIAGE (OUTPATIENT)
Dept: FAMILY MEDICINE | Facility: CLINIC | Age: 41
End: 2021-02-01

## 2021-02-01 NOTE — TELEPHONE ENCOUNTER
Patient would like to speak to a nurse- Patient of Tomas- she was dx with covid back in November. And ever since - she just hasn't been feeling good. Bodyaches, nausea, not keeping much food/drink down....Offered to schedule with filomena tomorrow or one of her partners today. She didn't know what to do.... Please advise

## 2021-02-01 NOTE — TELEPHONE ENCOUNTER
"Called to discuss further with the patient. Patient had COVID back in November 2020 and bronchitis in December 2020.  Since then she has intermittent periods where she feels good and bad. Last Friday the patient developed nausea, vomiting, and bodyaches. Patient denies any other symptoms. She has not been eating much. Today was the most she ate since Friday, which was less than a 1/2c of rice and 4 salteen crackers. She states she has been drinking less than 30 oz of fluids per day. She has only urinated 2-3 times within the last hour. She has been taking Pepcid due to feeling more gassy than usual. Patient to triage sounds fatigued and weak.     PLAN:   Patient should go to ED to be evaluated. Patient became tearful with plan, but is willing to go. Writer is concerned with possible dehydration.     Rome Mayberry RN, BSN  [place] [facility]  February 1, 2021      1. COVID-19 DIAGNOSIS: \"Who made your Coronavirus (COVID-19) diagnosis?\" \"Was it confirmed by a positive lab test?\" If not diagnosed by a HCP, ask \"Are there lots of cases (community spread) where you live?\" (See public health department website, if unsure)      Patient had COVID in November.   2. COVID-19 EXPOSURE: \"Was there any known exposure to COVID before the symptoms began?\" CDC Definition of close contact: within 6 feet (2 meters) for a total of 15 minutes or more over a 24-hour period.       Not that she knows of.   3. ONSET: \"When did the COVID-19 symptoms start?\"       Friday.   4. WORST SYMPTOM: \"What is your worst symptom?\" (e.g., cough, fever, shortness of breath, muscle aches)      Bodyaches, nausea, vomiting  5. COUGH: \"Do you have a cough?\" If so, ask: \"How bad is the cough?\"        NA  6. FEVER: \"Do you have a fever?\" If so, ask: \"What is your temperature, how was it measured, and when did it start?\"      NA  7. RESPIRATORY STATUS: \"Describe your breathing?\" (e.g., shortness of breath, wheezing, unable to speak)       " "NA  8. BETTER-SAME-WORSE: \"Are you getting better, staying the same or getting worse compared to yesterday?\"  If getting worse, ask, \"In what way?\"      Worse, no energy.   9. HIGH RISK DISEASE: \"Do you have any chronic medical problems?\" (e.g., asthma, heart or lung disease, weak immune system, obesity, etc.)      NA  10. PREGNANCY: \"Is there any chance you are pregnant?\" \"When was your last menstrual period?\"        NA  11. OTHER SYMPTOMS: \"Do you have any other symptoms?\"  (e.g., chills, fatigue, headache, loss of smell or taste, muscle pain, sore throat; new loss of smell or taste especially support the diagnosis of COVID-19)        Fatigue, headache, chills    Rome Mayberry, RN, BSN  [place]  February 1, 2021      Reason for Disposition    Patient sounds very sick or weak to the triager    Additional Information    Negative: SEVERE difficulty breathing (e.g., struggling for each breath, speaks in single words)    Negative: Difficult to awaken or acting confused (e.g., disoriented, slurred speech)    Negative: Bluish (or gray) lips or face now    Negative: Shock suspected (e.g., cold/pale/clammy skin, too weak to stand, low BP, rapid pulse)    Negative: Sounds like a life-threatening emergency to the triager    Negative: [1] COVID-19 exposure AND [2] no symptoms    Negative: [1] Lives with someone known to have influenza (flu test positive) AND [2] flu-like symptoms (e.g., cough, runny nose, sore throat, SOB; with or without fever)    Negative: [1] Adult with possible COVID-19 symptoms AND [2] triager concerned about severity of symptoms or other causes    Negative: COVID-19 vaccine reaction suspected (e.g., fever, headache, muscle aches) occurring during days 1-3 after getting vaccine    Negative: COVID-19 vaccine, questions about    Negative: COVID-19 and breastfeeding, questions about    Negative: SEVERE or constant chest pain or pressure (Exception: mild central chest pain, present " only when coughing)    Negative: MODERATE difficulty breathing (e.g., speaks in phrases, SOB even at rest, pulse 100-120)    Negative: [1] Headache AND [2] stiff neck (can't touch chin to chest)    Negative: MILD difficulty breathing (e.g., minimal/no SOB at rest, SOB with walking, pulse <100)    Negative: Chest pain or pressure    Protocols used: CORONAVIRUS (COVID-19) DIAGNOSED OR [id number]-LO 1.3

## 2021-02-01 NOTE — TELEPHONE ENCOUNTER
"  Answer Assessment - Initial Assessment Questions  1. COVID-19 DIAGNOSIS: \"Who made your Coronavirus (COVID-19) diagnosis?\" \"Was it confirmed by a positive lab test?\" If not diagnosed by a HCP, ask \"Are there lots of cases (community spread) where you live?\" (See public health department website, if unsure)      *No Answer*  2. COVID-19 EXPOSURE: \"Was there any known exposure to COVID before the symptoms began?\" CDC Definition of close contact: within 6 feet (2 meters) for a total of 15 minutes or more over a 24-hour period.       *No Answer*  3. ONSET: \"When did the COVID-19 symptoms start?\"       *No Answer*  4. WORST SYMPTOM: \"What is your worst symptom?\" (e.g., cough, fever, shortness of breath, muscle aches)      *No Answer*  5. COUGH: \"Do you have a cough?\" If so, ask: \"How bad is the cough?\"        *No Answer*  6. FEVER: \"Do you have a fever?\" If so, ask: \"What is your temperature, how was it measured, and when did it start?\"      *No Answer*  7. RESPIRATORY STATUS: \"Describe your breathing?\" (e.g., shortness of breath, wheezing, unable to speak)       *No Answer*  8. BETTER-SAME-WORSE: \"Are you getting better, staying the same or getting worse compared to yesterday?\"  If getting worse, ask, \"In what way?\"      *No Answer*  9. HIGH RISK DISEASE: \"Do you have any chronic medical problems?\" (e.g., asthma, heart or lung disease, weak immune system, obesity, etc.)      *No Answer*  10. PREGNANCY: \"Is there any chance you are pregnant?\" \"When was your last menstrual period?\"        *No Answer*  11. OTHER SYMPTOMS: \"Do you have any other symptoms?\"  (e.g., chills, fatigue, headache, loss of smell or taste, muscle pain, sore throat; new loss of smell or taste especially support the diagnosis of COVID-19)        *No Answer*    Protocols used: CORONAVIRUS (COVID-19) DIAGNOSED OR [id number] 1.3    "

## 2021-04-14 ENCOUNTER — OFFICE VISIT (OUTPATIENT)
Dept: NURSING | Facility: CLINIC | Age: 41
End: 2021-04-14
Payer: COMMERCIAL

## 2021-04-14 PROCEDURE — 91300 PR COVID VAC PFIZER DIL RECON 30 MCG/0.3 ML IM: CPT

## 2021-04-14 PROCEDURE — 0001A PR COVID VAC PFIZER DIL RECON 30 MCG/0.3 ML IM: CPT

## 2021-04-22 ENCOUNTER — PATIENT OUTREACH (OUTPATIENT)
Dept: FAMILY MEDICINE | Facility: CLINIC | Age: 41
End: 2021-04-22

## 2021-04-22 DIAGNOSIS — R87.612 PAPANICOLAOU SMEAR OF CERVIX WITH LOW GRADE SQUAMOUS INTRAEPITHELIAL LESION (LGSIL): ICD-10-CM

## 2021-05-05 ENCOUNTER — IMMUNIZATION (OUTPATIENT)
Dept: NURSING | Facility: CLINIC | Age: 41
End: 2021-05-05
Attending: INTERNAL MEDICINE
Payer: COMMERCIAL

## 2021-05-05 PROCEDURE — 0002A PR COVID VAC PFIZER DIL RECON 30 MCG/0.3 ML IM: CPT

## 2021-05-05 PROCEDURE — 91300 PR COVID VAC PFIZER DIL RECON 30 MCG/0.3 ML IM: CPT

## 2021-05-18 DIAGNOSIS — J30.1 SEASONAL ALLERGIC RHINITIS DUE TO POLLEN: ICD-10-CM

## 2021-05-18 DIAGNOSIS — U07.1 2019 NOVEL CORONAVIRUS DISEASE (COVID-19): ICD-10-CM

## 2021-05-18 RX ORDER — ALBUTEROL SULFATE 90 UG/1
AEROSOL, METERED RESPIRATORY (INHALATION)
Qty: 17 G | Refills: 0 | Status: SHIPPED | OUTPATIENT
Start: 2021-05-18 | End: 2021-12-23

## 2021-05-18 RX ORDER — MONTELUKAST SODIUM 10 MG/1
TABLET ORAL
Qty: 90 TABLET | Refills: 0 | Status: SHIPPED | OUTPATIENT
Start: 2021-05-18 | End: 2021-09-20

## 2021-05-18 NOTE — TELEPHONE ENCOUNTER
Pending Prescriptions:                       Disp   Refills    montelukast (SINGULAIR) 10 MG tablet [Pha*90 tab*0            Sig: TAKE 1 TABLET AT BEDTIME    albuterol (PROAIR HFA/PROVENTIL HFA/ESTER*17 g   0            Sig: USE 2 INHALATIONS EVERY 6 HOURS    Medication is being filled for 1 time gael refill only due to:  Patient is due for medication check.     Please call and help schedule.  Thank you!    Rome Mayberry RN, BSN  [place]  May 18, 2021

## 2021-06-24 NOTE — TELEPHONE ENCOUNTER
FYI to provider - Patient is lost to pap tracking follow-up. Attempts to contact pt have been made per reminder process and there has been no reply and/or no appt scheduled.       5/5/20 NIL pap, + HR HPV (not 16 or 18). Plan: cotest due 5/5/21 4/22/21 Reminder mychart  5/24/21 Reminder call - spoke to pt.   6/24/21 Lost to follow-up for pap tracking

## 2021-07-01 ENCOUNTER — TRANSFERRED RECORDS (OUTPATIENT)
Dept: HEALTH INFORMATION MANAGEMENT | Facility: CLINIC | Age: 41
End: 2021-07-01
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
## 2021-09-19 ENCOUNTER — HEALTH MAINTENANCE LETTER (OUTPATIENT)
Age: 41
End: 2021-09-19

## 2021-09-20 ENCOUNTER — OFFICE VISIT (OUTPATIENT)
Dept: FAMILY MEDICINE | Facility: CLINIC | Age: 41
End: 2021-09-20
Payer: COMMERCIAL

## 2021-09-20 VITALS
RESPIRATION RATE: 16 BRPM | HEIGHT: 63 IN | BODY MASS INDEX: 24.27 KG/M2 | TEMPERATURE: 98.1 F | SYSTOLIC BLOOD PRESSURE: 100 MMHG | OXYGEN SATURATION: 98 % | DIASTOLIC BLOOD PRESSURE: 74 MMHG | WEIGHT: 137 LBS | HEART RATE: 78 BPM

## 2021-09-20 DIAGNOSIS — Z12.31 ENCOUNTER FOR SCREENING MAMMOGRAM FOR BREAST CANCER: ICD-10-CM

## 2021-09-20 DIAGNOSIS — J30.1 SEASONAL ALLERGIC RHINITIS DUE TO POLLEN: ICD-10-CM

## 2021-09-20 DIAGNOSIS — F41.1 GENERALIZED ANXIETY DISORDER: ICD-10-CM

## 2021-09-20 DIAGNOSIS — G47.9 DISTURBANCE IN SLEEP BEHAVIOR: ICD-10-CM

## 2021-09-20 DIAGNOSIS — Z71.89 ADVANCED DIRECTIVES, COUNSELING/DISCUSSION: ICD-10-CM

## 2021-09-20 DIAGNOSIS — Z23 NEED FOR PROPHYLACTIC VACCINATION AND INOCULATION AGAINST INFLUENZA: ICD-10-CM

## 2021-09-20 DIAGNOSIS — Z00.00 ROUTINE MEDICAL EXAM: Primary | ICD-10-CM

## 2021-09-20 DIAGNOSIS — J45.30 MILD PERSISTENT ASTHMA WITHOUT COMPLICATION: ICD-10-CM

## 2021-09-20 DIAGNOSIS — Z11.3 SCREEN FOR STD (SEXUALLY TRANSMITTED DISEASE): ICD-10-CM

## 2021-09-20 LAB
BASOPHILS # BLD AUTO: 0 10E3/UL (ref 0–0.2)
BASOPHILS NFR BLD AUTO: 0 %
CHOLEST SERPL-MCNC: 171 MG/DL
EOSINOPHIL # BLD AUTO: 0.1 10E3/UL (ref 0–0.7)
EOSINOPHIL NFR BLD AUTO: 1 %
ERYTHROCYTE [DISTWIDTH] IN BLOOD BY AUTOMATED COUNT: 13.1 % (ref 10–15)
FASTING STATUS PATIENT QL REPORTED: NO
HBA1C MFR BLD: 5.6 % (ref 0–5.6)
HCT VFR BLD AUTO: 36.5 % (ref 35–47)
HDLC SERPL-MCNC: 51 MG/DL
HGB BLD-MCNC: 12.1 G/DL (ref 11.7–15.7)
LDLC SERPL CALC-MCNC: 101 MG/DL
LYMPHOCYTES # BLD AUTO: 2.1 10E3/UL (ref 0.8–5.3)
LYMPHOCYTES NFR BLD AUTO: 27 %
MCH RBC QN AUTO: 29.3 PG (ref 26.5–33)
MCHC RBC AUTO-ENTMCNC: 33.2 G/DL (ref 31.5–36.5)
MCV RBC AUTO: 88 FL (ref 78–100)
MONOCYTES # BLD AUTO: 0.6 10E3/UL (ref 0–1.3)
MONOCYTES NFR BLD AUTO: 8 %
NEUTROPHILS # BLD AUTO: 5.2 10E3/UL (ref 1.6–8.3)
NEUTROPHILS NFR BLD AUTO: 65 %
NONHDLC SERPL-MCNC: 120 MG/DL
PLATELET # BLD AUTO: 270 10E3/UL (ref 150–450)
RBC # BLD AUTO: 4.13 10E6/UL (ref 3.8–5.2)
TRIGL SERPL-MCNC: 97 MG/DL
TSH SERPL DL<=0.005 MIU/L-ACNC: 1.5 MU/L (ref 0.4–4)
WBC # BLD AUTO: 8 10E3/UL (ref 4–11)

## 2021-09-20 PROCEDURE — G0145 SCR C/V CYTO,THINLAYER,RESCR: HCPCS | Performed by: NURSE PRACTITIONER

## 2021-09-20 PROCEDURE — 83036 HEMOGLOBIN GLYCOSYLATED A1C: CPT | Performed by: NURSE PRACTITIONER

## 2021-09-20 PROCEDURE — 85025 COMPLETE CBC W/AUTO DIFF WBC: CPT | Performed by: NURSE PRACTITIONER

## 2021-09-20 PROCEDURE — G0124 SCREEN C/V THIN LAYER BY MD: HCPCS

## 2021-09-20 PROCEDURE — 36415 COLL VENOUS BLD VENIPUNCTURE: CPT | Performed by: NURSE PRACTITIONER

## 2021-09-20 PROCEDURE — 87389 HIV-1 AG W/HIV-1&-2 AB AG IA: CPT | Performed by: NURSE PRACTITIONER

## 2021-09-20 PROCEDURE — 87491 CHLMYD TRACH DNA AMP PROBE: CPT | Performed by: NURSE PRACTITIONER

## 2021-09-20 PROCEDURE — 90686 IIV4 VACC NO PRSV 0.5 ML IM: CPT | Performed by: NURSE PRACTITIONER

## 2021-09-20 PROCEDURE — 93000 ELECTROCARDIOGRAM COMPLETE: CPT | Performed by: NURSE PRACTITIONER

## 2021-09-20 PROCEDURE — 86803 HEPATITIS C AB TEST: CPT | Performed by: NURSE PRACTITIONER

## 2021-09-20 PROCEDURE — 84443 ASSAY THYROID STIM HORMONE: CPT | Performed by: NURSE PRACTITIONER

## 2021-09-20 PROCEDURE — 87624 HPV HI-RISK TYP POOLED RSLT: CPT | Performed by: NURSE PRACTITIONER

## 2021-09-20 PROCEDURE — 99396 PREV VISIT EST AGE 40-64: CPT | Mod: 25 | Performed by: NURSE PRACTITIONER

## 2021-09-20 PROCEDURE — 99214 OFFICE O/P EST MOD 30 MIN: CPT | Mod: 25 | Performed by: NURSE PRACTITIONER

## 2021-09-20 PROCEDURE — 87591 N.GONORRHOEAE DNA AMP PROB: CPT | Performed by: NURSE PRACTITIONER

## 2021-09-20 PROCEDURE — 80061 LIPID PANEL: CPT | Performed by: NURSE PRACTITIONER

## 2021-09-20 PROCEDURE — 90471 IMMUNIZATION ADMIN: CPT | Performed by: NURSE PRACTITIONER

## 2021-09-20 RX ORDER — BUSPIRONE HYDROCHLORIDE 15 MG/1
15 TABLET ORAL 2 TIMES DAILY
Qty: 180 TABLET | Refills: 1 | Status: SHIPPED | OUTPATIENT
Start: 2021-09-20 | End: 2022-05-09

## 2021-09-20 RX ORDER — VENLAFAXINE HYDROCHLORIDE 150 MG/1
300 CAPSULE, EXTENDED RELEASE ORAL DAILY
Qty: 180 CAPSULE | Refills: 1 | Status: SHIPPED | OUTPATIENT
Start: 2021-09-20 | End: 2022-03-21

## 2021-09-20 RX ORDER — FEXOFENADINE HCL 180 MG/1
180 TABLET ORAL DAILY
COMMUNITY
End: 2023-08-04

## 2021-09-20 RX ORDER — TRAZODONE HYDROCHLORIDE 50 MG/1
50 TABLET, FILM COATED ORAL AT BEDTIME
Qty: 90 TABLET | Refills: 1 | Status: SHIPPED | OUTPATIENT
Start: 2021-09-20 | End: 2022-03-21

## 2021-09-20 RX ORDER — MONTELUKAST SODIUM 10 MG/1
1 TABLET ORAL AT BEDTIME
Qty: 90 TABLET | Refills: 2 | Status: SHIPPED | OUTPATIENT
Start: 2021-09-20 | End: 2022-10-13

## 2021-09-20 RX ORDER — LORAZEPAM 0.5 MG/1
TABLET ORAL
COMMUNITY
Start: 2021-01-05 | End: 2022-10-13

## 2021-09-20 ASSESSMENT — ANXIETY QUESTIONNAIRES
4. TROUBLE RELAXING: NOT AT ALL
1. FEELING NERVOUS, ANXIOUS, OR ON EDGE: NOT AT ALL
2. NOT BEING ABLE TO STOP OR CONTROL WORRYING: SEVERAL DAYS
7. FEELING AFRAID AS IF SOMETHING AWFUL MIGHT HAPPEN: NOT AT ALL
6. BECOMING EASILY ANNOYED OR IRRITABLE: NOT AT ALL
5. BEING SO RESTLESS THAT IT IS HARD TO SIT STILL: NOT AT ALL
3. WORRYING TOO MUCH ABOUT DIFFERENT THINGS: SEVERAL DAYS
8. IF YOU CHECKED OFF ANY PROBLEMS, HOW DIFFICULT HAVE THESE MADE IT FOR YOU TO DO YOUR WORK, TAKE CARE OF THINGS AT HOME, OR GET ALONG WITH OTHER PEOPLE?: NOT DIFFICULT AT ALL
7. FEELING AFRAID AS IF SOMETHING AWFUL MIGHT HAPPEN: NOT AT ALL
GAD7 TOTAL SCORE: 2

## 2021-09-20 ASSESSMENT — PATIENT HEALTH QUESTIONNAIRE - PHQ9
10. IF YOU CHECKED OFF ANY PROBLEMS, HOW DIFFICULT HAVE THESE PROBLEMS MADE IT FOR YOU TO DO YOUR WORK, TAKE CARE OF THINGS AT HOME, OR GET ALONG WITH OTHER PEOPLE: VERY DIFFICULT
SUM OF ALL RESPONSES TO PHQ QUESTIONS 1-9: 8
SUM OF ALL RESPONSES TO PHQ QUESTIONS 1-9: 8

## 2021-09-20 ASSESSMENT — MIFFLIN-ST. JEOR: SCORE: 1252.62

## 2021-09-20 ASSESSMENT — PAIN SCALES - GENERAL: PAINLEVEL: NO PAIN (0)

## 2021-09-20 NOTE — RESULT ENCOUNTER NOTE
Barbara,  I have reviewed your labs, and they are all normal. If you have questions, please notify me through MyChart or a telephone call.   Nicole Soto, DNP

## 2021-09-20 NOTE — RESULT ENCOUNTER NOTE
Barbara,  Your labs that have returned are normal. More labs are still processing. Nicole Soto, DNP

## 2021-09-20 NOTE — LETTER
My Asthma Action Plan    Name: Barbara Yates   YOB: 1980  Date: 9/22/2021   My doctor: MANJULA Guerrero CNP   My clinic: [facility]        My Control Medicine: Mometasone furoate + formoterol (Dulera) -  200/5 mcg twice daily  Montelukast (Singulair) -  10 mg daily  My Rescue Medicine: Albuterol (Proair/Ventolin/Proventil HFA) 2-4 puffs EVERY 4 HOURS as needed. Use a spacer if recommended by your provider.   My Asthma Severity:   Mild Persistent  Know your asthma triggers: see attached               GREEN ZONE   Good Control    I feel good    No cough or wheeze    Can work, sleep and play without asthma symptoms       Take your asthma control medicine every day.     1. If exercise triggers your asthma, take your rescue medication    15 minutes before exercise or sports, and    During exercise if you have asthma symptoms  2. Spacer to use with inhaler: If you have a spacer, make sure to use it with your inhaler             YELLOW ZONE Getting Worse  I have ANY of these:    I do not feel good    Cough or wheeze    Chest feels tight    Wake up at night   1. Keep taking your Green Zone medications  2. Start taking your rescue medicine:    every 20 minutes for up to 1 hour. Then every 4 hours for 24-48 hours.  3. If you stay in the Yellow Zone for more than 12-24 hours, contact your doctor.  4. If you do not return to the Green Zone in 12-24 hours or you get worse, start taking your oral steroid medicine if prescribed by your provider.           RED ZONE Medical Alert - Get Help  I have ANY of these:    I feel awful    Medicine is not helping    Breathing getting harder    Trouble walking or talking    Nose opens wide to breathe       1. Take your rescue medicine NOW  2. If your provider has prescribed an oral steroid medicine, start taking it NOW  3. Call your doctor NOW  4. If you are still in the Red Zone after 20 minutes and you have not reached your doctor:    Take your  rescue medicine again and    Call 911 or go to the emergency room right away    See your regular doctor within 2 weeks of an Emergency Room or Urgent Care visit for follow-up treatment.          Annual Reminders:  Meet with Asthma Educator,  Flu Shot in the Fall, consider Pneumonia Vaccination for patients with asthma (aged 19 and older).    Pharmacy:    [facility] - EMPLOYEE ONLY MAIL ORDER  [id number] [place] - [address] [address] [facility] [address]  [facility] - [facility], [place] - [address] [id number] - [place], [place] - [address] AT [facility] OF  & [address]  [id number] - [place], [place] - [address] AT [facility] OF  [address]  [id number] - [place], [place] - [address] AT [facility] & [facility]    Electronically signed by MANJULA Guerrero CNP   Date: 09/22/21                      Asthma Triggers  How To Control Things That Make Your Asthma Worse    Triggers are things that make your asthma worse.  Look at the list below to help you find your triggers and what you can do about them.  You can help prevent asthma flare-ups by staying away from your triggers.      Trigger                                                          What you can do   Cigarette Smoke  Tobacco smoke can make asthma worse. Do not allow smoking in your home, car or around you.  Be sure no one smokes at a child s day care or school.  If you smoke, ask your health care provider for ways to help you quit.  Ask family members to quit too.  Ask your health care provider for a referral to Quit Plan to help you quit smoking, or call 6-621-996-PLAN.     Colds, Flu, Bronchitis  These are common triggers of asthma. Wash your hands often.  Don t touch your eyes, nose or mouth.  Get a flu shot every year.     Dust Mites  These are tiny bugs that live in cloth or carpet. They are too small to see. Wash sheets and blankets in hot water every week.   Encase  pillows and mattress in dust mite proof covers.  Avoid having carpet if you can. If you have carpet, vacuum weekly.   Use a dust mask and HEPA vacuum.   Pollen and Outdoor Mold  Some people are allergic to trees, grass, or weed pollen, or molds. Try to keep your windows closed.  Limit time out doors when pollen count is high.   Ask you health care provider about taking medicine during allergy season.     Animal Dander  Some people are allergic to skin flakes, urine or saliva from pets with fur or feathers. Keep pets with fur or feathers out of your home.    If you can t keep the pet outdoors, then keep the pet out of your bedroom.  Keep the bedroom door closed.  Keep pets off cloth furniture and away from stuffed toys.     Mice, Rats, and Cockroaches   Some people are allergic to the waste from these pests.   Cover food and garbage.  Clean up spills and food crumbs.  Store grease in the refrigerator.   Keep food out of the bedroom.   Indoor Mold  This can be a trigger if your home has high moisture. Fix leaking faucets, pipes, or other sources of water.   Clean moldy surfaces.  Dehumidify basement if it is damp and smelly.   Smoke, Strong Odors, and Sprays  These can reduce air quality. Stay away from strong odors and sprays, such as perfume, powder, hair spray, paints, smoke incense, paint, cleaning products, candles and new carpet.   Exercise or Sports  Some people with asthma have this trigger. Be active!  Ask your doctor about taking medicine before sports or exercise to prevent symptoms.    Warm up for 5-10 minutes before and after sports or exercise.     Other Triggers of Asthma  Cold air:  Cover your nose and mouth with a scarf.  Sometimes laughing or crying can be a trigger.  Some medicines and food can trigger asthma.

## 2021-09-21 LAB
C TRACH DNA SPEC QL NAA+PROBE: NEGATIVE
HCV AB SERPL QL IA: NONREACTIVE
HIV 1+2 AB+HIV1 P24 AG SERPL QL IA: NONREACTIVE
N GONORRHOEA DNA SPEC QL NAA+PROBE: NEGATIVE

## 2021-09-21 ASSESSMENT — ANXIETY QUESTIONNAIRES: GAD7 TOTAL SCORE: 2

## 2021-09-22 ENCOUNTER — TELEPHONE (OUTPATIENT)
Dept: FAMILY MEDICINE | Facility: CLINIC | Age: 41
End: 2021-09-22

## 2021-09-22 ASSESSMENT — PATIENT HEALTH QUESTIONNAIRE - PHQ9: SUM OF ALL RESPONSES TO PHQ QUESTIONS 1-9: 8

## 2021-09-22 NOTE — TELEPHONE ENCOUNTER
Spoke to patient, patient answered physical HPI questions and visit chart has been updated. Sent ACT to patient via [employer] message. Asked her to view it and respond with answers.     Routing to provider as [id number]

## 2021-09-22 NOTE — TELEPHONE ENCOUNTER
Patient and I discussed asthma at visit.   Please update ACT.   Also advise she is eligible for a pneumococcal vaccine at follow-up.     MANJULA Guerrero CNP

## 2021-09-22 NOTE — TELEPHONE ENCOUNTER
Please call patient and complete Physical questionnaire and ACT.     Patient and I discussed asthma at visit.   Please update ACT.   Also advise she is eligible for a pneumococcal vaccine at follow-up.     MANJULA Guerrero CNP

## 2021-09-27 LAB
BKR LAB AP GYN ADEQUACY: ABNORMAL
BKR LAB AP GYN INTERPRETATION: ABNORMAL
BKR LAB AP HPV REFLEX: ABNORMAL
BKR LAB AP LMP: ABNORMAL
BKR LAB AP PREVIOUS ABNL DX: ABNORMAL
BKR LAB AP PREVIOUS ABNORMAL: ABNORMAL
PATH REPORT.COMMENTS IMP SPEC: ABNORMAL
PATH REPORT.RELEVANT HX SPEC: ABNORMAL

## 2021-09-28 ENCOUNTER — PATIENT OUTREACH (OUTPATIENT)
Dept: FAMILY MEDICINE | Facility: CLINIC | Age: 41
End: 2021-09-28
Payer: COMMERCIAL

## 2021-09-28 LAB
HUMAN PAPILLOMA VIRUS 16 DNA: NEGATIVE
HUMAN PAPILLOMA VIRUS 18 DNA: NEGATIVE
HUMAN PAPILLOMA VIRUS FINAL DIAGNOSIS: ABNORMAL
HUMAN PAPILLOMA VIRUS OTHER HR: POSITIVE

## 2021-09-29 ENCOUNTER — TELEPHONE (OUTPATIENT)
Dept: FAMILY MEDICINE | Facility: CLINIC | Age: 41
End: 2021-09-29

## 2021-09-29 NOTE — TELEPHONE ENCOUNTER
Reason for Call:  Form, our goal is to have forms completed with 72 hours, however, some forms may require a visit or additional information.    Type of letter, form or note:  medical    Who is the form from?: Insurance comp    Where did the form come from: Patient or family brought in       What clinic location was the form placed at?: [facility] [phone]    Where the form was placed:  Box/Folder    What number is listed as a contact on the form?: fax [phone]       Additional comments: patient would like the form faxed to [facility] and then to the patient at [phone]    Call taken on 9/29/2021 at 9:25 AM by Lea Ennis

## 2021-09-30 NOTE — TELEPHONE ENCOUNTER
Form has been faxed to patient [phone] and [employer] [phone]    Sent to scanning, confirmed on Right Fax   Closing encounter  Tanya AGUILERA (R)

## 2021-10-01 ENCOUNTER — TELEPHONE (OUTPATIENT)
Dept: OBGYN | Facility: CLINIC | Age: 41
End: 2021-10-01

## 2021-10-01 NOTE — TELEPHONE ENCOUNTER
[facility] Call Center    Phone Message    May a detailed message be left on voicemail: yes     Reason for Call: Patient called wanting to schedule a colposcopy with a female provider. Please advise. Thank you.    Action Taken: Message routed to:  [facility] p [phone]    Travel Screening: Not Applicable

## 2021-10-04 ENCOUNTER — TRANSFERRED RECORDS (OUTPATIENT)
Dept: HEALTH INFORMATION MANAGEMENT | Facility: CLINIC | Age: 41
End: 2021-10-04

## 2021-10-15 ENCOUNTER — ANCILLARY PROCEDURE (OUTPATIENT)
Dept: MAMMOGRAPHY | Facility: CLINIC | Age: 41
End: 2021-10-15
Attending: NURSE PRACTITIONER
Payer: COMMERCIAL

## 2021-10-15 DIAGNOSIS — Z12.31 ENCOUNTER FOR SCREENING MAMMOGRAM FOR BREAST CANCER: ICD-10-CM

## 2021-10-15 PROCEDURE — 77067 SCR MAMMO BI INCL CAD: CPT | Mod: GC | Performed by: RADIOLOGY

## 2021-10-15 PROCEDURE — 77063 BREAST TOMOSYNTHESIS BI: CPT | Mod: GC | Performed by: RADIOLOGY

## 2021-10-27 ENCOUNTER — TRANSFERRED RECORDS (OUTPATIENT)
Dept: HEALTH INFORMATION MANAGEMENT | Facility: CLINIC | Age: 41
End: 2021-10-27
Payer: COMMERCIAL

## 2021-10-28 ENCOUNTER — OFFICE VISIT (OUTPATIENT)
Dept: OBGYN | Facility: CLINIC | Age: 41
End: 2021-10-28
Payer: COMMERCIAL

## 2021-10-28 VITALS
WEIGHT: 138.4 LBS | SYSTOLIC BLOOD PRESSURE: 114 MMHG | BODY MASS INDEX: 24.91 KG/M2 | HEART RATE: 94 BPM | DIASTOLIC BLOOD PRESSURE: 72 MMHG | OXYGEN SATURATION: 97 %

## 2021-10-28 DIAGNOSIS — R87.612 PAPANICOLAOU SMEAR OF CERVIX WITH LOW GRADE SQUAMOUS INTRAEPITHELIAL LESION (LGSIL): Primary | ICD-10-CM

## 2021-10-28 DIAGNOSIS — R87.810 CERVICAL HIGH RISK HUMAN PAPILLOMAVIRUS (HPV) DNA TEST POSITIVE: ICD-10-CM

## 2021-10-28 LAB — HCG UR QL: NEGATIVE

## 2021-10-28 PROCEDURE — 99213 OFFICE O/P EST LOW 20 MIN: CPT | Mod: 25 | Performed by: OBSTETRICS & GYNECOLOGY

## 2021-10-28 PROCEDURE — 88305 TISSUE EXAM BY PATHOLOGIST: CPT | Performed by: PATHOLOGY

## 2021-10-28 PROCEDURE — 57454 BX/CURETT OF CERVIX W/SCOPE: CPT | Performed by: OBSTETRICS & GYNECOLOGY

## 2021-10-28 PROCEDURE — 81025 URINE PREGNANCY TEST: CPT | Performed by: OBSTETRICS & GYNECOLOGY

## 2021-10-28 NOTE — PROGRESS NOTES
"This 40 y/o female, , using the Kyleena IUD for contraception (due for removal in 3/2025), presents for colposcopy examination due to her abnormal pap and DNA marker results on 2021.  Her pap smear demonstrated LSIL and RUDY 1 changes coupled with \"other\" high-risk HPV subtypes.  Therefore, informed consent was reviewed and obtained for colposcopy with biopsies and all her questions and concerns were addressed.  Her pap smear results in both  and  were normal but she also tested for \"other\" high-risk HPV in .  Her UPT result was negative today and she denies any risk of recent pregnancy exposure.  /72 (BP Location: Left arm, Cuff Size: Adult Regular)   Pulse 94   Wt 62.8 kg (138 lb 6.4 oz)   SpO2 97%   Breastfeeding No   BMI 24.91 kg/m    Negative UPT today.  In the dorsal lithotomy position, a bi-valve speculum was placed and her cervix was soaked with 3% acetic acid.  After several minutes, this fluid was removed and colposcopy was performed.  A localized area of grade 1 acetowhite was noted at the 12 o'clock position of the ectocervix so a biopsy was obtained after the endocervical curettings were obtained and submitted.  She tolerated the procedure well and there were no complications.  This was a satisfactory colposcopy examination and f/u care and directions were discussed.  At no time during the procedure were her IUD strings or the IUD itself displaced or dislodged.  The IUD strings were noted to be of the correct length.  Assessment - Abnormal pap and DNA marker test results which demontrated LSIL/RUDY 1 and \"other\" high-risk HPV subtypes  Plan - The 2 specimens were submitted to pathology and will treat if indicated.  F/u care and directions were reviewed with the patient and all her questions and concerns were addressed.  20 minutes were spent today in chart review, the patient visit, review of tests, and documentation in regards to the issues noted above.  This did NOT " include the time spent in the procedure (colposcopy with biopsies).

## 2021-10-29 ENCOUNTER — ANCILLARY PROCEDURE (OUTPATIENT)
Dept: MAMMOGRAPHY | Facility: CLINIC | Age: 41
End: 2021-10-29
Attending: NURSE PRACTITIONER
Payer: COMMERCIAL

## 2021-10-29 ENCOUNTER — ANCILLARY PROCEDURE (OUTPATIENT)
Dept: ULTRASOUND IMAGING | Facility: CLINIC | Age: 41
End: 2021-10-29
Attending: NURSE PRACTITIONER
Payer: COMMERCIAL

## 2021-10-29 DIAGNOSIS — R92.8 ABNORMAL MAMMOGRAM: ICD-10-CM

## 2021-10-29 PROCEDURE — 77061 BREAST TOMOSYNTHESIS UNI: CPT | Mod: LT | Performed by: RADIOLOGY

## 2021-10-29 PROCEDURE — 77065 DX MAMMO INCL CAD UNI: CPT | Mod: LT | Performed by: RADIOLOGY

## 2021-10-29 PROCEDURE — 76642 ULTRASOUND BREAST LIMITED: CPT | Mod: LT | Performed by: RADIOLOGY

## 2021-11-02 ENCOUNTER — NURSE TRIAGE (OUTPATIENT)
Dept: FAMILY MEDICINE | Facility: CLINIC | Age: 41
End: 2021-11-02

## 2021-11-02 LAB
PATH REPORT.COMMENTS IMP SPEC: NORMAL
PATH REPORT.FINAL DX SPEC: NORMAL
PATH REPORT.GROSS SPEC: NORMAL
PATH REPORT.MICROSCOPIC SPEC OTHER STN: NORMAL
PATH REPORT.RELEVANT HX SPEC: NORMAL
PHOTO IMAGE: NORMAL

## 2021-11-02 NOTE — TELEPHONE ENCOUNTER
"Calling with symptoms of cough and fatigue since 10/22 and thought it was allergies. On 10/29 more fatigue, chills, cough is worsening.     Cough is dry. There is some yellow thick mucous that comes up but not often. Today more chills.     Using Delera as her lungs have not been the same since she had COVID a year ago.     Some SOB that she uses the Delera for. Chest tightness but very mild.     Has tried DayQuil, NyQuil, Mucinex.     Patient is fully vaccinated for COVID.     HOME CARE: You should be able to treat this at home.    Patient is going to get COVID tested tomorrow morning 11/3.     Advised if patient has trouble breathing, chest pain, or worsening symptoms to give us a call or go into ED. Patient states understanding.       1. ONSET: \"When did the cough begin?\"       See above  2. SEVERITY: \"How bad is the cough today?\"       Worsening  3. RESPIRATORY DISTRESS: \"Describe your breathing.\"       Breathing is good  4. FEVER: \"Do you have a fever?\" If so, ask: \"What is your temperature, how was it measured, and when did it start?\"      No  5. HEMOPTYSIS: \"Are you coughing up any blood?\" If so ask: \"How much?\" (flecks, streaks, tablespoons, etc.)      No  6. TREATMENT: \"What have you done so far to treat the cough?\" (e.g., meds, fluids, humidifier)      See above  7. CARDIAC HISTORY: \"Do you have any history of heart disease?\" (e.g., heart attack, congestive heart failure)       None  8. LUNG HISTORY: \"Do you have any history of lung disease?\"  (e.g., pulmonary embolus, asthma, emphysema)      None  9. PE RISK FACTORS: \"Do you have a history of blood clots?\" (or: recent major surgery, recent prolonged travel, bedridden)      None  10. OTHER SYMPTOMS: \"Do you have any other symptoms? (e.g., runny nose, wheezing, chest pain)        See above  11. PREGNANCY: \"Is there any chance you are pregnant?\" \"When was your last menstrual period?\"        Did not ask      Reason for Disposition    Cough with cold symptoms " (e.g., runny nose, postnasal drip, throat clearing)    Additional Information    Negative: Bluish (or gray) lips or face    Negative: Severe difficulty breathing (e.g., struggling for each breath, speaks in single words)    Negative: Rapid onset of cough and has hives    Negative: Coughing started suddenly after medicine, an allergic food or bee sting    Negative: Difficulty breathing after exposure to flames, smoke, or fumes    Negative: Sounds like a life-threatening emergency to the triager    Negative: Previous asthma attacks and this feels like asthma attack    Negative: Chest pain present when not coughing    Negative: Difficulty breathing    Negative: Passed out (i.e., fainted, collapsed and was not responding)    Negative: Patient sounds very sick or weak to the triager    Negative: Coughed up > 1 tablespoon (15 ml) blood (Exception: blood-tinged sputum)    Negative: Fever > 103 F (39.4 C)    Negative: Fever > 101 F (38.3 C) and over 60 years of age    Negative: Fever > 100.0 F (37.8 C) and has diabetes mellitus or a weak immune system (e.g., HIV positive, cancer chemotherapy, organ transplant, splenectomy, chronic steroids)    Negative: Fever > 100.0 F (37.8 C) and bedridden (e.g., nursing home patient, stroke, chronic illness, recovering from surgery)    Negative: Increasing ankle swelling    Negative: Wheezing is present    Negative: SEVERE coughing spells (e.g., whooping sound after coughing, vomiting after coughing)    Negative: Coughing up luz maria-colored (reddish-brown) or blood-tinged sputum    Negative: Fever present > 3 days (72 hours)    Negative: Fever returns after gone for over 24 hours and symptoms worse or not improved    Negative: Using nasal washes and pain medicine > 24 hours and sinus pain persists    Negative: Known COPD or other severe lung disease (i.e., bronchiectasis, cystic fibrosis, lung surgery) and worsening symptoms (i.e., increased sputum purulence or amount, increased breathing  difficulty)    Negative: Continuous (nonstop) coughing interferes with work or school and no improvement using cough treatment per Care Advice    Negative: Patient wants to be seen    Negative: Cough has been present for > 3 weeks    Negative: Allergy symptoms are also present (e.g., itchy eyes, clear nasal discharge, postnasal drip)    Negative: Nasal discharge present > 10 days    Negative: Exposure to TB (Tuberculosis)    Negative: Taking an ACE Inhibitor medication (e.g., benazepril/LOTENSIN, captopril/CAPOTEN, enalapril/VASOTEC, lisinopril/ZESTRIL)    Negative: Cough with no complications    Protocols used: COUGH-A-OH

## 2021-11-19 NOTE — TELEPHONE ENCOUNTER
"Long Miles,  Pt had colpo with you on 10/28/21 . You released the following comment to the pt regarding the results.    \"Your ectocervical biopsy result at the 12 o'clock position was normal but the pathologist would like more tissue obtained from the endocervical canal.  We could collect this in the clinic without the need for repeat colposcopy.  Please call if you have any questions.\"    When did you want pt to return to the clinic for this sample?    Thanks!  Kd Edward, MANOJN, RN  Pap Tracking Nurse    "

## 2021-11-22 ENCOUNTER — PATIENT OUTREACH (OUTPATIENT)
Dept: OBGYN | Facility: CLINIC | Age: 41
End: 2021-11-22
Payer: COMMERCIAL

## 2021-11-22 DIAGNOSIS — R87.612 PAPANICOLAOU SMEAR OF CERVIX WITH LOW GRADE SQUAMOUS INTRAEPITHELIAL LESION (LGSIL): ICD-10-CM

## 2021-11-22 NOTE — TELEPHONE ENCOUNTER
Response from provider forwarded below.    Anytime that she is not on her menses.    Message text

## 2021-11-22 NOTE — TELEPHONE ENCOUNTER
10/28/21 COLP - negative. ECC- Non diagnostic. Plan repeat ECC .  11/22/21 Pt notified by phone of need for appt.

## 2021-11-23 ENCOUNTER — TELEPHONE (OUTPATIENT)
Dept: OBGYN | Facility: CLINIC | Age: 41
End: 2021-11-23
Payer: COMMERCIAL

## 2021-11-23 NOTE — TELEPHONE ENCOUNTER
Nori Miles, DO  [facility] Triage 3 minutes ago (12:40 PM)     MM    This patient just needs an ECC done in the clinic but NO colposcopy.  The pathologist just wants more endocervical cells to look at.

## 2021-11-23 NOTE — TELEPHONE ENCOUNTER
[facility]    Phone Message    May a detailed message be left on voicemail: yes     Reason for Call: Pt said that she needs to schedule a procedure with Dr. Miles but she doesn't know what the procedure is called.  She's requesting a call back to get the procedure scheduled.  Thanks.    Action Taken: Message routed to:  [facility] p [phone]    Travel Screening: Not Applicable

## 2021-11-23 NOTE — TELEPHONE ENCOUNTER
Is pt having another colposcopy/ECC done?   Or what exactly is she having done?    Please advise.  Britney Yates CMA 11/23/2021 12:34 PM

## 2021-12-09 ENCOUNTER — OFFICE VISIT (OUTPATIENT)
Dept: OBGYN | Facility: CLINIC | Age: 41
End: 2021-12-09
Payer: COMMERCIAL

## 2021-12-09 VITALS
HEIGHT: 63 IN | SYSTOLIC BLOOD PRESSURE: 107 MMHG | BODY MASS INDEX: 24.27 KG/M2 | HEART RATE: 85 BPM | DIASTOLIC BLOOD PRESSURE: 68 MMHG | OXYGEN SATURATION: 97 % | WEIGHT: 137 LBS

## 2021-12-09 DIAGNOSIS — R87.612 LGSIL ON PAP SMEAR OF CERVIX: Primary | ICD-10-CM

## 2021-12-09 DIAGNOSIS — R87.810 CERVICAL HIGH RISK HPV (HUMAN PAPILLOMAVIRUS) TEST POSITIVE: ICD-10-CM

## 2021-12-09 PROCEDURE — 57505 ENDOCERVICAL CURETTAGE: CPT | Performed by: OBSTETRICS & GYNECOLOGY

## 2021-12-09 PROCEDURE — 88305 TISSUE EXAM BY PATHOLOGIST: CPT | Performed by: PATHOLOGY

## 2021-12-09 ASSESSMENT — MIFFLIN-ST. JEOR: SCORE: 1247.62

## 2021-12-09 NOTE — PROGRESS NOTES
"This 40 y/o female, , using a Kyleena IUD for contraception, presents today for a recheck ECC since there were not enough cells on the specimen.  Her ectocervical biopsy at the 12 o'clock position on 10/28/2021 was negative.  She had this colposcopy for evaluation of a LSIL pap and \"other\" high-risk HPV subtypes on her DNA marker test.  /68 (BP Location: Right arm, Cuff Size: Adult Regular)   Pulse 85   Ht 1.588 m (5' 2.5\")   Wt 62.1 kg (137 lb)   SpO2 97%   Breastfeeding No   BMI 24.66 kg/m    ROS:  10 systems were reviewed and the positives were listed under problems.  Informed consent was reviewed and obtained for an ECC.    In the dorsal lithotomy position, a bi-valve speculum was placed and the 12 o'clock position was grasped with a single-toothed tenaculum.  With care to avoid dislodging her IUD/strings, endocervical curettage was performed and the specimen was submitted to pathology.  She tolerated the procedure well and the IUD strings were noted to be the same length post-procedurally as pre-procedurally.  There were no complications and all instruments were removed.  Assessment - Endocervical curettage for follow up of a LSIL pap and \"other\" HPV subtypes  Plan - Submit the ECC and treat if indicated.  20 minutes were spent today in chart review, the patient visit, review of tests, and documentation in regards to the issues noted above.  This did NOT include the time spent in the procedure (endocervical curettage).    "

## 2021-12-13 LAB
PATH REPORT.COMMENTS IMP SPEC: NORMAL
PATH REPORT.COMMENTS IMP SPEC: NORMAL
PATH REPORT.FINAL DX SPEC: NORMAL
PATH REPORT.GROSS SPEC: NORMAL
PATH REPORT.MICROSCOPIC SPEC OTHER STN: NORMAL
PATH REPORT.RELEVANT HX SPEC: NORMAL
PHOTO IMAGE: NORMAL

## 2021-12-16 ENCOUNTER — PATIENT OUTREACH (OUTPATIENT)
Dept: OBGYN | Facility: CLINIC | Age: 41
End: 2021-12-16
Payer: COMMERCIAL

## 2021-12-16 NOTE — TELEPHONE ENCOUNTER
Hi Dr Miles,  Pt had benign ECC done on 12/9/21. You did notifiy pt with results but did not provide recommendation for plan. Okay to update tracking to [facility] in 1 year?    Thanks!    Kd Edward, MANOJN, RN  Pap Tracking Nurse

## 2021-12-22 NOTE — PROGRESS NOTES
Barbara is a 41 year old who is being evaluated via a billable video visit.      How would you like to obtain your AVS? MyChart  If the video visit is dropped, the invitation should be resent by: Text to cell phone: [phone]  Will anyone else be joining your video visit? No    Video Start Time: 3:25 PM    Assessment & Plan         Bronchitis  Return to clinic if symptoms persist or worsen. Antipyretics/analgesics prn, fluids, rest, supportive cares, prn OTC decongestants.  See orders.   - azithromycin (ZITHROMAX) 250 MG tablet; 2 tablets daily on day one, then one tablet po daily until gone.    Mild persistent asthma without complication  Refilled med.   - mometasone-formoterol (DULERA) 200-5 MCG/ACT inhaler; Inhale 2 puffs into the lungs 2 times daily    - med refilled.   - albuterol (PROAIR HFA/PROVENTIL HFA/VENTOLIN HFA) 108 (90 Base) MCG/ACT inhaler; USE 2 INHALATIONS EVERY 6 HOURS             MEDICATIONS:  Continue current medications without change    Return in about 4 weeks (around 1/20/2022) for re-check office visit- vaccines.    MANJULA Guerrero CNP  [facility] LAURIE Jimenez is a 41 year old who presents for the following health issues     HPI     Acute Illness  Acute illness concerns: had negative at home covid test X 2.  Onset/Duration: 2 weeks  Symptoms:  Fever: no  Chills/Sweats: no  Headache (location?): YES  Sinus Pressure: YES  Conjunctivitis:  no  Ear Pain: no  Rhinorrhea: YES  Congestion: YES  Sore Throat: YES  Cough: YES  Wheeze: no  Decreased Appetite: YES  Nausea: no  Vomiting: no  Diarrhea: no  Dysuria/Freq.: no  Dysuria or Hematuria: no  Fatigue/Achiness: YES- both  Sick/Strep Exposure: no  Therapies tried and outcome: day and nightquil, tylenol, mucinex DM    Has asthma- exacerbation symptoms.     [place]. Is UTD.     Review of Systems   Constitutional, HEENT, cardiovascular, pulmonary, gi and gu systems are negative, except as otherwise noted.       Objective           Vitals:  No vitals were obtained today due to virtual visit.    Physical Exam   GENERAL: healthy, alert, no distress and fatigued  EYES: Eyes grossly normal to inspection.  No discharge or erythema, or obvious scleral/conjunctival abnormalities.  RESP: No audible wheeze,  Has dry mild cough noted, no visible cyanosis.  No visible retractions or increased work of breathing.    SKIN: Visible skin clear. No significant rash, abnormal pigmentation or lesions.  NEURO: Cranial nerves grossly intact.  Mentation and speech appropriate for age.  PSYCH: Mentation appears normal, affect normal/bright, judgement and insight intact, normal speech and appearance well-groomed.                Video-Visit Details    Type of service:  Video Visit    Video End Time:3:36 PM    Originating Location (pt. Location): Home    Distant Location (provider location):  [email or web address]     Platform used for Video Visit: [employer]

## 2021-12-23 ENCOUNTER — VIRTUAL VISIT (OUTPATIENT)
Dept: FAMILY MEDICINE | Facility: CLINIC | Age: 41
End: 2021-12-23
Payer: COMMERCIAL

## 2021-12-23 DIAGNOSIS — J45.30 MILD PERSISTENT ASTHMA WITHOUT COMPLICATION: ICD-10-CM

## 2021-12-23 DIAGNOSIS — J40 BRONCHITIS: Primary | ICD-10-CM

## 2021-12-23 PROCEDURE — 99213 OFFICE O/P EST LOW 20 MIN: CPT | Mod: 95 | Performed by: NURSE PRACTITIONER

## 2021-12-23 RX ORDER — ALBUTEROL SULFATE 90 UG/1
AEROSOL, METERED RESPIRATORY (INHALATION)
Qty: 17 G | Refills: 0 | Status: SHIPPED | OUTPATIENT
Start: 2021-12-23 | End: 2022-07-25

## 2021-12-23 RX ORDER — AZITHROMYCIN 250 MG/1
TABLET, FILM COATED ORAL
Qty: 6 TABLET | Refills: 0 | Status: SHIPPED | OUTPATIENT
Start: 2021-12-23 | End: 2022-10-13

## 2021-12-24 ASSESSMENT — ASTHMA QUESTIONNAIRES: ACT_TOTALSCORE: 17

## 2022-02-06 ENCOUNTER — TELEPHONE (OUTPATIENT)
Dept: FAMILY MEDICINE | Facility: CLINIC | Age: 42
End: 2022-02-06
Payer: COMMERCIAL

## 2022-02-08 ASSESSMENT — ANXIETY QUESTIONNAIRES
6. BECOMING EASILY ANNOYED OR IRRITABLE: MORE THAN HALF THE DAYS
3. WORRYING TOO MUCH ABOUT DIFFERENT THINGS: NOT AT ALL
7. FEELING AFRAID AS IF SOMETHING AWFUL MIGHT HAPPEN: NOT AT ALL
IF YOU CHECKED OFF ANY PROBLEMS ON THIS QUESTIONNAIRE, HOW DIFFICULT HAVE THESE PROBLEMS MADE IT FOR YOU TO DO YOUR WORK, TAKE CARE OF THINGS AT HOME, OR GET ALONG WITH OTHER PEOPLE: SOMEWHAT DIFFICULT
2. NOT BEING ABLE TO STOP OR CONTROL WORRYING: NOT AT ALL
1. FEELING NERVOUS, ANXIOUS, OR ON EDGE: SEVERAL DAYS
GAD7 TOTAL SCORE: 3
5. BEING SO RESTLESS THAT IT IS HARD TO SIT STILL: NOT AT ALL

## 2022-02-08 ASSESSMENT — PATIENT HEALTH QUESTIONNAIRE - PHQ9
5. POOR APPETITE OR OVEREATING: NOT AT ALL
SUM OF ALL RESPONSES TO PHQ QUESTIONS 1-9: 6

## 2022-02-08 NOTE — TELEPHONE ENCOUNTER
Patient scheduled for an office visit. PHQ-9 and NELL-7 completed. Will complete ACT in person at visit.     Patient got COVID-19 booster and will bring her card to the appt to update her chart.     Next 5 appointments (look out 90 days)    Mar 21, 2022  1:30 PM  (Arrive by 1:10 PM)  Provider Visit with MANJULA Payne CNP  [facility] ([facility] ) [address], [address]  [facility] [id number] [phone]        Farhat Connolly MA on 2/8/2022 at 9:43 AM

## 2022-02-08 NOTE — TELEPHONE ENCOUNTER
Patient Quality Outreach    Patient is due for the following:   Asthma  -  ACT needed and Asthma follow-up visit  Depression  -  PHQ-9 Needed and Depression follow-up visit  Immunizations  -  Covid and Pneumococcal    NEXT STEPS:   Schedule a nurse only visit for COVID booster and pneumonia and/or an  office visit for medication recheck     Type of outreach:  mychart unread  Please call patient and do PHQ9 recheck over the phone and see if willing to scheduled float for COVID and pneumonia vaccine as well as an office visit for medicaiton recheck (can do vaccines at that visit).   Please route back to me if unable to reach-can close if scheduled for office visit.    Questions for provider review:    None     Татьяна Bansal, [facility]  Chart routed to Care Team.

## 2022-02-09 ASSESSMENT — ANXIETY QUESTIONNAIRES: GAD7 TOTAL SCORE: 3

## 2022-03-02 NOTE — PROGRESS NOTES
Assessment & Plan     (F41.1) Generalized anxiety disorder  (primary encounter diagnosis)  Comment: stable, continue plan.   Plan: venlafaxine (EFFEXOR-XR) 150 MG 24 hr capsule        Meds refilled.     (G47.9) Disturbance in sleep behavior  Comment: controlled   Plan: traZODone (DESYREL) 50 MG tablet, venlafaxine         (EFFEXOR-XR) 150 MG 24 hr capsule        Meds refilled.     (Z11.3) Screen for STD (sexually transmitted disease)  Comment: low concern  Plan: Neisseria gonorrhoeae PCR, Chlamydia         trachomatis PCR        Safe sex advised.                    Return in about 6 months (around 9/21/2022) for Physical Exam, re-check office visit.    MANJULA Guerrero [facility] LAURIE Jimenez is a 41 year old who presents for the following health issues     History of Present Illness     Asthma:  She presents for follow up of asthma.  She has no cough, no wheezing, and no shortness of breath. She is using a relief medication a few times a month. She typically misses taking her controller medication 1 time(s) per week.Patient is aware of the following triggers: same as previous visit. The patient has not had a visit to the Emergency Room, Urgent Care or Hospital due to asthma since the last clinic visit.     Mental Health Follow-up:  Patient presents to follow-up on Depression & Anxiety.Patient's depression since last visit has been:  Good  The patient is not having other symptoms associated with depression.  Patient's anxiety since last visit has been:  Medium  The patient is not having other symptoms associated with anxiety.  Any significant life events: other  Patient is not feeling anxious or having panic attacks.  Patient has no concerns about alcohol or drug use.       Today's PHQ-9         PHQ-9 Total Score: 4  PHQ-9 Q9 Thoughts of better off dead/self-harm past 2 weeks :   (P) Not at all    How difficult have these problems made it for you to do your work, take care  of things at home, or get along with other people: Somewhat difficult    Today's NELL-7 Score: 7    She eats 0-1 servings of fruits and vegetables daily.She consumes 1 sweetened beverage(s) daily.She exercises with enough effort to increase her heart rate 9 or less minutes per day.  She exercises with enough effort to increase her heart rate 3 or less days per week.   She is taking medications regularly.       Medication Followup of Trazone and Venlafaxine     Taking Medication as prescribed: yes    Side Effects:  None    Medication Helping Symptoms:  yes       Life stressors, son with Autism and dealing with parenting styles with her son's father.   Her son had a nocturnal seizure a couple weeks ago, so this has been a stress. She feels it is stressed induced.    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     Is dating new partner, wants STD screen.   Has IUD in place.         Review of Systems   Constitutional, HEENT, cardiovascular, pulmonary, gi and gu systems are negative, except as otherwise noted.      Objective    /72 (BP Location: Left arm, Patient Position: Sitting, Cuff Size: Adult Regular)   Pulse 76   Temp 98.4  F (36.9  C) (Temporal)   Resp 20   Wt 61.4 kg (135 lb 7 oz)   SpO2 98%   BMI 24.38 kg/m    Body mass index is 24.38 kg/m .  Physical Exam   GENERAL: healthy, alert and no distress  NECK: no adenopathy, no asymmetry, masses, or scars and thyroid normal to palpation  RESP: lungs clear to auscultation - no rales, rhonchi or wheezes  CV: regular rate and rhythm, normal S1 S2, no S3 or S4, no murmur, click or rub, no peripheral edema and peripheral pulses strong  ABDOMEN: soft, nontender, no hepatosplenomegaly, no masses and bowel sounds normal   (female): normal female external genitalia, normal urethral meatus, vaginal mucosa, normal cervix/adnexa/uterus without masses or discharge, blue IUD strings are present  NEURO: Normal strength and tone, mentation intact and speech  normal  PSYCH: mentation appears normal, affect normal/bright                Answers for HPI/ROS submitted by the patient on 3/21/2022  If you checked off any problems, how difficult have these problems made it for you to do your work, take care of things at home, or get along with other people?: Somewhat difficult  PHQ9 TOTAL SCORE: 4  NELL 7 TOTAL SCORE: 7

## 2022-03-21 ENCOUNTER — OFFICE VISIT (OUTPATIENT)
Dept: FAMILY MEDICINE | Facility: CLINIC | Age: 42
End: 2022-03-21
Payer: COMMERCIAL

## 2022-03-21 VITALS
HEART RATE: 76 BPM | DIASTOLIC BLOOD PRESSURE: 72 MMHG | SYSTOLIC BLOOD PRESSURE: 102 MMHG | TEMPERATURE: 98.4 F | BODY MASS INDEX: 24.38 KG/M2 | OXYGEN SATURATION: 98 % | RESPIRATION RATE: 20 BRPM | WEIGHT: 135.44 LBS

## 2022-03-21 DIAGNOSIS — Z11.3 SCREEN FOR STD (SEXUALLY TRANSMITTED DISEASE): ICD-10-CM

## 2022-03-21 DIAGNOSIS — G47.9 DISTURBANCE IN SLEEP BEHAVIOR: ICD-10-CM

## 2022-03-21 DIAGNOSIS — F41.1 GENERALIZED ANXIETY DISORDER: Primary | ICD-10-CM

## 2022-03-21 PROCEDURE — 99214 OFFICE O/P EST MOD 30 MIN: CPT | Performed by: NURSE PRACTITIONER

## 2022-03-21 PROCEDURE — 87491 CHLMYD TRACH DNA AMP PROBE: CPT | Performed by: NURSE PRACTITIONER

## 2022-03-21 PROCEDURE — 87591 N.GONORRHOEAE DNA AMP PROB: CPT | Performed by: NURSE PRACTITIONER

## 2022-03-21 RX ORDER — VENLAFAXINE HYDROCHLORIDE 150 MG/1
300 CAPSULE, EXTENDED RELEASE ORAL DAILY
Qty: 180 CAPSULE | Refills: 1 | Status: SHIPPED | OUTPATIENT
Start: 2022-03-21 | End: 2022-10-11

## 2022-03-21 RX ORDER — TRAZODONE HYDROCHLORIDE 50 MG/1
50 TABLET, FILM COATED ORAL AT BEDTIME
Qty: 90 TABLET | Refills: 1 | Status: SHIPPED | OUTPATIENT
Start: 2022-03-21 | End: 2022-10-11

## 2022-03-21 ASSESSMENT — ASTHMA QUESTIONNAIRES
ACT_TOTALSCORE: 22
QUESTION_1 LAST FOUR WEEKS HOW MUCH OF THE TIME DID YOUR ASTHMA KEEP YOU FROM GETTING AS MUCH DONE AT WORK, SCHOOL OR AT HOME: NONE OF THE TIME
QUESTION_3 LAST FOUR WEEKS HOW OFTEN DID YOUR ASTHMA SYMPTOMS (WHEEZING, COUGHING, SHORTNESS OF BREATH, CHEST TIGHTNESS OR PAIN) WAKE YOU UP AT NIGHT OR EARLIER THAN USUAL IN THE MORNING: NOT AT ALL
QUESTION_2 LAST FOUR WEEKS HOW OFTEN HAVE YOU HAD SHORTNESS OF BREATH: ONCE OR TWICE A WEEK
ACT_TOTALSCORE: 22
QUESTION_4 LAST FOUR WEEKS HOW OFTEN HAVE YOU USED YOUR RESCUE INHALER OR NEBULIZER MEDICATION (SUCH AS ALBUTEROL): ONCE A WEEK OR LESS
QUESTION_5 LAST FOUR WEEKS HOW WOULD YOU RATE YOUR ASTHMA CONTROL: WELL CONTROLLED

## 2022-03-21 ASSESSMENT — ANXIETY QUESTIONNAIRES
GAD7 TOTAL SCORE: 7
7. FEELING AFRAID AS IF SOMETHING AWFUL MIGHT HAPPEN: SEVERAL DAYS
6. BECOMING EASILY ANNOYED OR IRRITABLE: SEVERAL DAYS
4. TROUBLE RELAXING: SEVERAL DAYS
GAD7 TOTAL SCORE: 7
GAD7 TOTAL SCORE: 7
2. NOT BEING ABLE TO STOP OR CONTROL WORRYING: SEVERAL DAYS
3. WORRYING TOO MUCH ABOUT DIFFERENT THINGS: SEVERAL DAYS
1. FEELING NERVOUS, ANXIOUS, OR ON EDGE: SEVERAL DAYS
5. BEING SO RESTLESS THAT IT IS HARD TO SIT STILL: SEVERAL DAYS
7. FEELING AFRAID AS IF SOMETHING AWFUL MIGHT HAPPEN: SEVERAL DAYS

## 2022-03-21 ASSESSMENT — PAIN SCALES - GENERAL: PAINLEVEL: NO PAIN (0)

## 2022-03-21 ASSESSMENT — PATIENT HEALTH QUESTIONNAIRE - PHQ9
SUM OF ALL RESPONSES TO PHQ QUESTIONS 1-9: 4
SUM OF ALL RESPONSES TO PHQ QUESTIONS 1-9: 4
10. IF YOU CHECKED OFF ANY PROBLEMS, HOW DIFFICULT HAVE THESE PROBLEMS MADE IT FOR YOU TO DO YOUR WORK, TAKE CARE OF THINGS AT HOME, OR GET ALONG WITH OTHER PEOPLE: SOMEWHAT DIFFICULT

## 2022-03-22 LAB
C TRACH DNA SPEC QL NAA+PROBE: NEGATIVE
N GONORRHOEA DNA SPEC QL NAA+PROBE: NEGATIVE

## 2022-03-22 ASSESSMENT — ANXIETY QUESTIONNAIRES: GAD7 TOTAL SCORE: 7

## 2022-03-22 ASSESSMENT — PATIENT HEALTH QUESTIONNAIRE - PHQ9: SUM OF ALL RESPONSES TO PHQ QUESTIONS 1-9: 4

## 2022-05-06 DIAGNOSIS — F41.1 GENERALIZED ANXIETY DISORDER: ICD-10-CM

## 2022-05-09 RX ORDER — BUSPIRONE HYDROCHLORIDE 15 MG/1
TABLET ORAL
Qty: 180 TABLET | Refills: 0 | Status: SHIPPED | OUTPATIENT
Start: 2022-05-09 | End: 2022-08-10

## 2022-07-23 DIAGNOSIS — J45.30 MILD PERSISTENT ASTHMA WITHOUT COMPLICATION: ICD-10-CM

## 2022-07-25 RX ORDER — ALBUTEROL SULFATE 90 UG/1
AEROSOL, METERED RESPIRATORY (INHALATION)
Qty: 17 G | Refills: 0 | Status: SHIPPED | OUTPATIENT
Start: 2022-07-25 | End: 2022-10-11

## 2022-07-30 ENCOUNTER — OFFICE VISIT (OUTPATIENT)
Dept: URGENT CARE | Facility: URGENT CARE | Age: 42
End: 2022-07-30
Payer: COMMERCIAL

## 2022-07-30 ENCOUNTER — NURSE TRIAGE (OUTPATIENT)
Dept: NURSING | Facility: CLINIC | Age: 42
End: 2022-07-30

## 2022-07-30 VITALS
WEIGHT: 136.38 LBS | TEMPERATURE: 98.4 F | DIASTOLIC BLOOD PRESSURE: 72 MMHG | HEART RATE: 77 BPM | BODY MASS INDEX: 24.55 KG/M2 | OXYGEN SATURATION: 98 % | SYSTOLIC BLOOD PRESSURE: 108 MMHG

## 2022-07-30 DIAGNOSIS — U07.1 ACUTE BRONCHITIS DUE TO COVID-19 VIRUS: Primary | ICD-10-CM

## 2022-07-30 DIAGNOSIS — J20.8 ACUTE BRONCHITIS DUE TO COVID-19 VIRUS: Primary | ICD-10-CM

## 2022-07-30 PROCEDURE — 99214 OFFICE O/P EST MOD 30 MIN: CPT | Performed by: PHYSICIAN ASSISTANT

## 2022-07-30 RX ORDER — AZITHROMYCIN 250 MG/1
TABLET, FILM COATED ORAL
Qty: 6 TABLET | Refills: 0 | Status: SHIPPED | OUTPATIENT
Start: 2022-07-30 | End: 2022-10-13

## 2022-07-30 RX ORDER — PREDNISONE 20 MG/1
40 TABLET ORAL DAILY
Qty: 10 TABLET | Refills: 0 | Status: SHIPPED | OUTPATIENT
Start: 2022-07-30 | End: 2022-08-04

## 2022-07-30 RX ORDER — BENZONATATE 200 MG/1
200 CAPSULE ORAL 3 TIMES DAILY PRN
Qty: 30 CAPSULE | Refills: 0 | Status: SHIPPED | OUTPATIENT
Start: 2022-07-30 | End: 2022-08-09

## 2022-07-30 ASSESSMENT — ENCOUNTER SYMPTOMS
CARDIOVASCULAR NEGATIVE: 1
SINUS PAIN: 0
COUGH: 1
SHORTNESS OF BREATH: 0
WHEEZING: 0
GASTROINTESTINAL NEGATIVE: 1
PALPITATIONS: 0
CONSTITUTIONAL NEGATIVE: 1
SINUS PRESSURE: 0
RHINORRHEA: 1
FATIGUE: 0
SORE THROAT: 1
CHILLS: 0
FEVER: 0

## 2022-07-30 NOTE — TELEPHONE ENCOUNTER
"Coronavirus (COVID-19) Notification    Caller Name (Patient, parent, daughter/son, grandparent, etc)  Barbara Yates    Reason for call  Notify of Positive Coronavirus (COVID-19) lab results, assess symptoms,  review  [employer] [place] recommendations    Lab Result    Lab test:  2019-nCoV rRt-PCR or SARS-CoV-2 PCR    Oropharyngeal AND/OR nasopharyngeal swabs is POSITIVE for 2019-nCoV RNA/SARS-COV-2 PCR (COVID-19 virus)    Brief introduction script  Introduce self then review script:  \"I am calling on behalf of [email or web address].  We were notified that your Coronavirus test (COVID-19) for was POSITIVE for the virus.\"    Gather patient reported symptoms      Assessment  Current Symptoms at time of phone call, reported by patient: (if no symptoms, document No symptoms]    Cough, chest congestion  Date of Symptom(s) onset (if applicable)    2/26/22    If at time of call, Patients symptoms hare worsened, the Patient should contact 911 or have someone drive them to Emergency Dept promptly:         If Patient calling 911, inform 911 personal that you have tested positive for the Coronavirus (COVID-19).  Place mask on and await 911 to arrive.       If Emergency Dept, If possible, please have another adult drive you to the Emergency Dept but you need to wear mask when in contact with other people.      Monoclonal Antibody Administration    You may be eligible to receive a new treatment with a monoclonal antibody for preventing hospitalization in patients at high risk for complications from COVID-19. This medication is still experimental and available on a limited basis; it is given through an IV and must be given at an infusion center. Please note that not all people who are eligible will receive the medication since it is in limited supply.  Is the patient symptomatic and onset of symptoms within the last 7 days?  Yes  Is the patient interested in a visit with a provider to discuss treatment options?: Yes  Is the patient " seen at [facility] or [place]?  No: Warm transfer caller to [phone] to be scheduled with a virtual urgent provider.  During transfer, instruct  on appropriate time frame for visit     Review information with Patient    Your result was positive. This means you have COVID-19 (coronavirus).      How can I protect others?    These guidelines are for isolating before returning to work, school or .          If you DO have symptoms:      o    Stay home and away from others          ?    For at least 5 days after your symptoms started, AND           ?    You are fever free for 24 hours (with no medicine that reduces fever), AND          ?    Your other symptoms are better.      o    Wear a mask for 10 full days any time you are around others.       If you DON'T have symptoms:      o    Stay at home and away from others for at least 5 days after your positive test.      o    Wear a mask for 10 full days any time you are around others.    There may be different guidelines for healthcare facilities. Please check with the specific sites before arriving.     If you've been told by a doctor that you were severely ill with COVID-19 or are immunocompromised, you should isolate for at least 10 days.    You should not go back to work until you meet the guidelines above for ending your home isolation. You don't need to be retested for COVID-19 before going back to work--studies show that you won't spread the virus if it's been at least 10 days since your symptoms started (or 20 days, if you have a weak immune system).    Employers, schools, and daycares: This is an official notice for this person's medical guidelines for returning in-person. They must meet the above guidelines before going back to work, school, or  in person.    You will receive a positive COVID-19 letter via [employer] or the mail soon with additional self-care information.      Would you like me to review some of that information with you  now?  Yes    How can I take care of myself?      Get lots of rest. Drink extra fluids (unless a doctor has told you not to).      Take Tylenol (acetaminophen) for fever or pain. If you have liver or kidney problems, ask your family doctor if it's okay to take Tylenol.     Take either:     650 mg (two 325 mg pills) every 4 to 6 hours, or     1,000 mg (two 500 mg pills) every 8 hours as needed.     Note: Do not take more than 3,000 mg in one day. Acetaminophen is found in many medicines (both prescribed and over-the-counter medicines). Read all labels to be sure you don't take too much.    For children, check the Tylenol bottle for the right dose (based on their age or weight).      If you have other health problems (like cancer, heart failure, an organ transplant or severe kidney disease): Call your specialty clinic if you don't feel better in the next 2 days.      Know when to call 911: Emergency warning signs include:    Trouble breathing or shortness of breath    Pain or pressure in the chest that doesn't go away    Feeling confused like you haven't felt before, or not being able to wake up    Bluish-colored lips or face        If you were tested for an upcoming procedure, please contact your provider for next steps.     No virtual visits available for paxlovid. Per protocol paged Dr. Kern at 1407.  Dr Kern called back at 1415 and would like patient to been seen at urgent care today. Called pt back and pt stated she will go to urgent care today.    DEBORAH ORLANDO RN    Reason for Disposition    HIGH RISK for severe COVID complications (e.g., weak immune system, age > 64 years, obesity with BMI > 25, pregnant, chronic lung disease or other chronic medical condition)  (Exception: Already seen by PCP and no new or worsening symptoms.)    Additional Information    Negative: SEVERE difficulty breathing (e.g., struggling for each breath, speaks in single words)    Negative: Difficult to awaken or acting  confused (e.g., disoriented, slurred speech)    Negative: Bluish (or gray) lips or face now    Negative: Shock suspected (e.g., cold/pale/clammy skin, too weak to stand, low BP, rapid pulse)    Negative: Sounds like a life-threatening emergency to the triager    Negative: [1] Diagnosed or suspected COVID-19 AND [2] symptoms lasting 3 or more weeks    Negative: [1] COVID-19 exposure AND [2] no symptoms    Negative: COVID-19 vaccine reaction suspected (e.g., fever, headache, muscle aches) occurring 1 to 3 days after getting vaccine    Negative: COVID-19 vaccine, questions about    Negative: [1] Lives with someone known to have influenza (flu test positive) AND [2] flu-like symptoms (e.g., cough, runny nose, sore throat, SOB; with or without fever)    Negative: [1] Adult with possible COVID-19 symptoms AND [2] triager concerned about severity of symptoms or other causes    Negative: COVID-19 and breastfeeding, questions about    Negative: SEVERE or constant chest pain or pressure  (Exception: Mild central chest pain, present only when coughing.)    Negative: MODERATE difficulty breathing (e.g., speaks in phrases, SOB even at rest, pulse 100-120)    Negative: [1] Headache AND [2] stiff neck (can't touch chin to chest)    Negative: Oxygen level (e.g., pulse oximetry) 90 percent or lower    Negative: Chest pain or pressure    Negative: Patient sounds very sick or weak to the triager    Negative: MILD difficulty breathing (e.g., minimal/no SOB at rest, SOB with walking, pulse <100)    Negative: Fever > 103 F (39.4 C)    Negative: [1] Fever > 101 F (38.3 C) AND [2] age > 60 years    Negative: [1] Fever > 100.0 F (37.8 C) AND [2] bedridden (e.g., nursing home patient, CVA, chronic illness, recovering from surgery)    Protocols used: CORONAVIRUS (COVID-19) DIAGNOSED OR [id number] 1.18.2022

## 2022-07-30 NOTE — PROGRESS NOTES
Subjective   Barbara is a 41 year old, presenting for the following health issues:  Urgent Care (Urgent care visit for COVID. Had a positive COVID-19 PCR test this past Thursday. Took test at [facility] and found result today.) and Covid Concern (The patient tested positive via PCR for COVID-19. She was tested Thurs an got the result today. She would like antivirals and also some Prednisone for her cough as she had COVID-19 before and the cough did not resolve. Symptoms started this past Monday/Tuesday. Positive for chest congestion and cough. No fever. No loss of taste/smell. Had a headache earlier this week but reduced Caffeine intake so not sure if it was that. She had body aches on Tuesday. No diarrhea. No nausea/vomiting. Sore throat.)    HPI   COVID-19 Symptom Review  How many days ago did these symptoms start? 4-5days  Are any of the following symptoms significant for you?    New or worsening difficulty breathing? No    Worsening cough? Yes, I am coughing up mucus.  Was given prednisone in the past with good relief and wants this again.     Fever or chills? No    Headache: YES    Sore throat: YES    Chest pain: No    Diarrhea: No    Body aches? YES    What treatments has patient tried? Inhalers, rest, fluids   Does patient live in a nursing home, group home, or shelter? No  Does patient have a way to get food/medications during quarantined? Yes, I have a friend or family member who can help me.          Patient Active Problem List   Diagnosis     Disturbance in sleep behavior     Urinary frequency     Papanicolaou smear of cervix with low grade squamous intraepithelial lesion (LGSIL)     Other abnormal Papanicolaou smear of cervix and cervical HPV(795.09)     CARDIOVASCULAR SCREENING; LDL GOAL LESS THAN 160     Generalized anxiety disorder     Moderate major depression (H)     Anal fissure     Acquired subluxation of left patella, subsequent encounter     Current Outpatient Medications   Medication      albuterol (PROAIR HFA/PROVENTIL HFA/VENTOLIN HFA) 108 (90 Base) MCG/ACT inhaler     azithromycin (ZITHROMAX) 250 MG tablet     busPIRone (BUSPAR) 15 MG tablet     fexofenadine (ALLEGRA) 180 MG tablet     levonorgestrel (KYLEENA) 19.5 MG IUD     LORazepam (ATIVAN) 0.5 MG tablet     mometasone-formoterol (DULERA) 200-5 MCG/ACT inhaler     montelukast (SINGULAIR) 10 MG tablet     traZODone (DESYREL) 50 MG tablet     triamcinolone (KENALOG) 0.1 % external ointment     venlafaxine (EFFEXOR-XR) 150 MG 24 hr capsule     No current facility-administered medications for this visit.        Allergies   Allergen Reactions     Seasonal Allergies        Review of Systems   Constitutional: Negative.  Negative for chills, fatigue and fever.   HENT: Positive for congestion, rhinorrhea and sore throat. Negative for ear discharge, ear pain, hearing loss, sinus pressure and sinus pain.    Respiratory: Positive for cough. Negative for shortness of breath and wheezing.    Cardiovascular: Negative.  Negative for chest pain, palpitations and peripheral edema.   Gastrointestinal: Negative.    All other systems reviewed and are negative.           Objective    /72 (BP Location: Left arm, Patient Position: Sitting, Cuff Size: Adult Regular)   Pulse 77   Temp 98.4  F (36.9  C) (Oral)   Wt 61.9 kg (136 lb 6 oz)   SpO2 98%   Breastfeeding No   BMI 24.55 kg/m    Body mass index is 24.55 kg/m .  Physical Exam  Vitals and nursing note reviewed.   Constitutional:       General: She is not in acute distress.     Appearance: Normal appearance. She is well-developed and normal weight. She is not ill-appearing.   HENT:      Head: Normocephalic and atraumatic.      Comments: TMs are intact without any erythema or bulging bilaterally.  Airway is patent.     Nose: Nose normal.      Mouth/Throat:      Mouth: Mucous membranes are moist.      Pharynx: Uvula midline. No pharyngeal swelling, oropharyngeal exudate or posterior oropharyngeal erythema.       Tonsils: No tonsillar exudate.   Eyes:      General: No scleral icterus.     Extraocular Movements: Extraocular movements intact.      Conjunctiva/sclera: Conjunctivae normal.      Pupils: Pupils are equal, round, and reactive to light.   Neck:      Thyroid: No thyromegaly.   Cardiovascular:      Rate and Rhythm: Normal rate and regular rhythm.      Pulses: Normal pulses.      Heart sounds: Normal heart sounds, S1 normal and S2 normal. No murmur heard.    No friction rub. No gallop.   Pulmonary:      Effort: Pulmonary effort is normal. No accessory muscle usage, respiratory distress or retractions.      Breath sounds: Normal breath sounds and air entry. No stridor. No decreased breath sounds, wheezing, rhonchi or rales.   Musculoskeletal:      Cervical back: Normal range of motion and neck supple.   Lymphadenopathy:      Cervical: No cervical adenopathy.   Skin:     General: Skin is warm and dry.      Nails: There is no clubbing.   Neurological:      Mental Status: She is alert and oriented to person, place, and time.   Psychiatric:         Mood and Affect: Mood normal.         Behavior: Behavior normal.         Thought Content: Thought content normal.         Judgment: Judgment normal.            Assessment/Plan:  Acute bronchitis due to COVID-19 virus:  Will avoid paxlovid due to multiple medication interactions with her buspirone, trazodone and dulera.  Will send for monoclonal antibody treatment instead.  She specifically wants prednisone as this has helped in the past.  Will treat with zithromax X5days, [id number], tessalon perles as needed for symptoms. Tylenol/ibuprofen prn fever/pain. Recommend self quarantine until it has been at least 5days since onset of symptoms with improvement and until s/he has been fever free for 24hours without the use of anti-pyretics. Recheck in clinic if symptoms worsen or if symptoms do not improve.  To the ER if he develops hemoptysis, chest pain, fevers>102,  worsening shortness of breath/wheezing.    -     azithromycin (ZITHROMAX) 250 MG tablet; 2 tablets the first day, then 1 tablet daily for the next 4 days  -     predniSONE (DELTASONE) 20 MG tablet; Take 2 tablets (40 mg) by mouth daily for 5 days  -     benzonatate (TESSALON) 200 MG capsule; Take 1 capsule (200 mg) by mouth 3 times daily as needed for cough  -     Covid Monoclonal Antibody Referral; Future        Sakina See BROOKE Wang  ..

## 2022-08-08 DIAGNOSIS — F41.1 GENERALIZED ANXIETY DISORDER: ICD-10-CM

## 2022-08-08 NOTE — LETTER
August 10, 2022      Barbara Yates  [address]  [place] [facility] [address]              Hi Barbara,    We just wanted to let you know that we sent in a refill for your buspirone but you are due for a physical in September before your next refill is due.     Please give us a call at [phone] or use [employer] to schedule.     Have a great day.    Your [facility] Team

## 2022-08-10 RX ORDER — BUSPIRONE HYDROCHLORIDE 15 MG/1
TABLET ORAL
Qty: 180 TABLET | Refills: 0 | Status: SHIPPED | OUTPATIENT
Start: 2022-08-10 | End: 2022-10-13

## 2022-08-10 NOTE — TELEPHONE ENCOUNTER
Pending Prescriptions:                       Disp   Refills    busPIRone (BUSPAR) 15 MG tablet [Pharmacy*180 ta*3            Sig: TAKE 1 TABLET TWICE A DAY    Medication is being filled for 1 time gael refill only due to:  Patient is due for physical in sept.     Please call and help schedule.  Thank you!      Rome Mayberry RN

## 2022-10-08 DIAGNOSIS — J45.30 MILD PERSISTENT ASTHMA WITHOUT COMPLICATION: ICD-10-CM

## 2022-10-08 DIAGNOSIS — G47.9 DISTURBANCE IN SLEEP BEHAVIOR: ICD-10-CM

## 2022-10-08 DIAGNOSIS — F41.1 GENERALIZED ANXIETY DISORDER: ICD-10-CM

## 2022-10-11 RX ORDER — VENLAFAXINE HYDROCHLORIDE 150 MG/1
CAPSULE, EXTENDED RELEASE ORAL
Qty: 60 CAPSULE | Refills: 0 | Status: SHIPPED | OUTPATIENT
Start: 2022-10-11 | End: 2022-10-13

## 2022-10-11 RX ORDER — TRAZODONE HYDROCHLORIDE 50 MG/1
TABLET, FILM COATED ORAL
Qty: 30 TABLET | Refills: 0 | Status: SHIPPED | OUTPATIENT
Start: 2022-10-11 | End: 2022-11-04

## 2022-10-11 RX ORDER — ALBUTEROL SULFATE 90 UG/1
AEROSOL, METERED RESPIRATORY (INHALATION)
Qty: 17 G | Refills: 0 | Status: SHIPPED | OUTPATIENT
Start: 2022-10-11 | End: 2022-10-13

## 2022-10-11 RX ORDER — MOMETASONE FUROATE AND FORMOTEROL FUMARATE DIHYDRATE 200; 5 UG/1; UG/1
AEROSOL RESPIRATORY (INHALATION)
Qty: 13 G | Refills: 0 | Status: SHIPPED | OUTPATIENT
Start: 2022-10-11 | End: 2022-10-13

## 2022-10-11 NOTE — TELEPHONE ENCOUNTER
"Pending Prescriptions:                       Disp   Refills    DULERA 200-5 MCG/ACT inhaler [Pharmacy Med*13 g   11       Sig: USE 2 INHALATIONS TWICE A DAY    albuterol (PROAIR HFA/PROVENTIL HFA/VENTOL*17 g   6        Sig: USE 2 INHALATIONS EVERY 6 HOURS    traZODone (DESYREL) 50 MG tablet [Pharmacy*90 tab*3        Sig: TAKE 1 TABLET AT BEDTIME AS NEEDED    venlafaxine (EFFEXOR XR) 150 MG 24 hr caps*180 ca*3        Sig: TAKE 2 CAPSULES DAILY    Routing refill request to provider for review/approval because:  Requested Prescriptions   Pending Prescriptions Disp Refills    DULERA 200-5 MCG/ACT inhaler [Pharmacy Med Name: DULERA INHALER 13GM 200/5MCG] 13 g 11     Sig: USE 2 INHALATIONS TWICE A DAY       Inhaled Steroids Protocol Failed - 10/8/2022  9:43 PM        Failed - Asthma control assessment score within normal limits in last 6 months     Please review ACT score.   ACT Total Scores 3/21/2022   ACT TOTAL SCORE (Goal Greater than or Equal to 20) 22   In the past 12 months, how many times did you visit the emergency room for your asthma without being admitted to the hospital? 0   In the past 12 months, how many times were you hospitalized overnight because of your asthma? 0            Passed - Patient is age 12 or older        Passed - Medication is active on med list        Passed - Recent (6 mo) or future (30 days) visit within the authorizing provider's specialty     Patient had office visit in the last 6 months or has a visit in the next 30 days with authorizing provider or within the authorizing provider's specialty.  See \"Patient Info\" tab in inbasket, or \"Choose Columns\" in Meds & Orders section of the refill encounter.           Long-Acting Beta Agonist Inhalers Protocol  Failed - 10/8/2022  9:43 PM        Failed - Asthma control assessment score within normal limits in last 6 months     Please review ACT score.           Passed - Patient is age 12 or older        Passed - Order for Serevent, Striverdi, or " "Foradil and pt has steroid inhaler        Passed - Medication is active on med list        Passed - Recent (6 mo) or future (30 days) visit within the authorizing provider's specialty     Patient had office visit in the last 6 months or has a visit in the next 30 days with authorizing provider or within the authorizing provider's specialty.  See \"Patient Info\" tab in inbasket, or \"Choose Columns\" in Meds & Orders section of the refill encounter.              albuterol (PROAIR HFA/PROVENTIL HFA/VENTOLIN HFA) 108 (90 Base) MCG/ACT inhaler [Pharmacy Med Name: ALBUTEROL(PA)HFA INH 8.5GM 90MCG] 17 g 6     Sig: USE 2 INHALATIONS EVERY 6 HOURS       Asthma Maintenance Inhalers - Anticholinergics Failed - 10/8/2022  9:43 PM        Failed - Asthma control assessment score within normal limits in last 6 months     Please review ACT score.           Passed - Patient is age 12 years or older        Passed - Medication is active on med list        Passed - Recent (6 mo) or future (30 days) visit within the authorizing provider's specialty     Patient had office visit in the last 6 months or has a visit in the next 30 days with authorizing provider or within the authorizing provider's specialty.  See \"Patient Info\" tab in inbasket, or \"Choose Columns\" in Meds & Orders section of the refill encounter.           Short-Acting Beta Agonist Inhalers Protocol  Failed - 10/8/2022  9:43 PM        Failed - Asthma control assessment score within normal limits in last 6 months     Please review ACT score.           Passed - Patient is age 12 or older        Passed - Medication is active on med list        Passed - Recent (6 mo) or future (30 days) visit within the authorizing provider's specialty     Patient had office visit in the last 6 months or has a visit in the next 30 days with authorizing provider or within the authorizing provider's specialty.  See \"Patient Info\" tab in inbasket, or \"Choose Columns\" in Meds & Orders section of the " "refill encounter.              traZODone (DESYREL) 50 MG tablet [Pharmacy Med Name: TRAZODONE HCL TABS 50MG] 90 tablet 3     Sig: TAKE 1 TABLET AT BEDTIME AS NEEDED       Serotonin Modulators Passed - 10/8/2022  9:43 PM        Passed - Recent (12 mo) or future (30 days) visit within the authorizing provider's specialty     Patient has had an office visit with the authorizing provider or a provider within the authorizing providers department within the previous 12 mos or has a future within next 30 days. See \"Patient Info\" tab in inbasket, or \"Choose Columns\" in Meds & Orders section of the refill encounter.              Passed - Medication is active on med list        Passed - Patient is age 18 or older        Passed - No active pregnancy on record        Passed - No positive pregnancy test in past 12 months          venlafaxine (EFFEXOR XR) 150 MG 24 hr capsule [Pharmacy Med Name: VENLAFAXINE HCL ER CAPS 150MG] 180 capsule 3     Sig: TAKE 2 CAPSULES DAILY       Serotonin-Norepinephrine Reuptake Inhibitors  Failed - 10/8/2022  9:43 PM        Failed - Normal serum creatinine on file in past 12 months     Recent Labs   Lab Test 10/08/20  0901   CR 0.76       Ok to refill medication if creatinine is low          Passed - Blood pressure under 140/90 in past 12 months     BP Readings from Last 3 Encounters:   07/30/22 108/72   03/21/22 102/72   12/09/21 107/68                 Passed - Recent (12 mo) or future (30 days) visit within the authorizing provider's specialty     Patient has had an office visit with the authorizing provider or a provider within the authorizing providers department within the previous 12 mos or has a future within next 30 days. See \"Patient Info\" tab in inbasket, or \"Choose Columns\" in Meds & Orders section of the refill encounter.              Passed - Medication is active on med list        Passed - Patient is age 18 or older        Passed - No active pregnancy on record        Passed - No " positive pregnancy test in past 12 months           Cheyanne Ladd RN on 10/11/2022 at 2:06 PM

## 2022-10-13 ENCOUNTER — OFFICE VISIT (OUTPATIENT)
Dept: FAMILY MEDICINE | Facility: CLINIC | Age: 42
End: 2022-10-13
Payer: COMMERCIAL

## 2022-10-13 VITALS
DIASTOLIC BLOOD PRESSURE: 78 MMHG | SYSTOLIC BLOOD PRESSURE: 116 MMHG | BODY MASS INDEX: 25.09 KG/M2 | HEART RATE: 77 BPM | HEIGHT: 63 IN | TEMPERATURE: 97.5 F | RESPIRATION RATE: 13 BRPM | WEIGHT: 141.6 LBS | OXYGEN SATURATION: 100 %

## 2022-10-13 DIAGNOSIS — F41.1 GENERALIZED ANXIETY DISORDER: ICD-10-CM

## 2022-10-13 DIAGNOSIS — J45.30 MILD PERSISTENT ASTHMA WITHOUT COMPLICATION: ICD-10-CM

## 2022-10-13 DIAGNOSIS — Z00.00 ROUTINE GENERAL MEDICAL EXAMINATION AT A HEALTH CARE FACILITY: Primary | ICD-10-CM

## 2022-10-13 DIAGNOSIS — Z12.31 ENCOUNTER FOR SCREENING MAMMOGRAM FOR BREAST CANCER: ICD-10-CM

## 2022-10-13 DIAGNOSIS — R87.612 LGSIL OF CERVIX OF UNDETERMINED SIGNIFICANCE: ICD-10-CM

## 2022-10-13 DIAGNOSIS — G47.9 DISTURBANCE IN SLEEP BEHAVIOR: ICD-10-CM

## 2022-10-13 DIAGNOSIS — Z12.4 CERVICAL CANCER SCREENING: ICD-10-CM

## 2022-10-13 DIAGNOSIS — R63.5 WEIGHT GAIN: ICD-10-CM

## 2022-10-13 DIAGNOSIS — J30.1 SEASONAL ALLERGIC RHINITIS DUE TO POLLEN: ICD-10-CM

## 2022-10-13 LAB
BASOPHILS # BLD AUTO: 0 10E3/UL (ref 0–0.2)
BASOPHILS NFR BLD AUTO: 0 %
CHOLEST SERPL-MCNC: 155 MG/DL
EOSINOPHIL # BLD AUTO: 0.1 10E3/UL (ref 0–0.7)
EOSINOPHIL NFR BLD AUTO: 1 %
ERYTHROCYTE [DISTWIDTH] IN BLOOD BY AUTOMATED COUNT: 12.8 % (ref 10–15)
FASTING STATUS PATIENT QL REPORTED: YES
HBA1C MFR BLD: 5.5 % (ref 0–5.6)
HCT VFR BLD AUTO: 36.1 % (ref 35–47)
HDLC SERPL-MCNC: 54 MG/DL
HGB BLD-MCNC: 12 G/DL (ref 11.7–15.7)
IMM GRANULOCYTES # BLD: 0 10E3/UL
IMM GRANULOCYTES NFR BLD: 0 %
LDLC SERPL CALC-MCNC: 90 MG/DL
LYMPHOCYTES # BLD AUTO: 1.5 10E3/UL (ref 0.8–5.3)
LYMPHOCYTES NFR BLD AUTO: 22 %
MCH RBC QN AUTO: 29.7 PG (ref 26.5–33)
MCHC RBC AUTO-ENTMCNC: 33.2 G/DL (ref 31.5–36.5)
MCV RBC AUTO: 89 FL (ref 78–100)
MONOCYTES # BLD AUTO: 0.5 10E3/UL (ref 0–1.3)
MONOCYTES NFR BLD AUTO: 8 %
NEUTROPHILS # BLD AUTO: 4.6 10E3/UL (ref 1.6–8.3)
NEUTROPHILS NFR BLD AUTO: 68 %
NONHDLC SERPL-MCNC: 101 MG/DL
PLATELET # BLD AUTO: 224 10E3/UL (ref 150–450)
RBC # BLD AUTO: 4.04 10E6/UL (ref 3.8–5.2)
TRIGL SERPL-MCNC: 55 MG/DL
TSH SERPL DL<=0.005 MIU/L-ACNC: 1.01 MU/L (ref 0.4–4)
WBC # BLD AUTO: 6.7 10E3/UL (ref 4–11)

## 2022-10-13 PROCEDURE — 87624 HPV HI-RISK TYP POOLED RSLT: CPT | Performed by: NURSE PRACTITIONER

## 2022-10-13 PROCEDURE — 36415 COLL VENOUS BLD VENIPUNCTURE: CPT | Performed by: NURSE PRACTITIONER

## 2022-10-13 PROCEDURE — 80061 LIPID PANEL: CPT | Performed by: NURSE PRACTITIONER

## 2022-10-13 PROCEDURE — 90471 IMMUNIZATION ADMIN: CPT | Performed by: NURSE PRACTITIONER

## 2022-10-13 PROCEDURE — G0145 SCR C/V CYTO,THINLAYER,RESCR: HCPCS | Performed by: NURSE PRACTITIONER

## 2022-10-13 PROCEDURE — 85025 COMPLETE CBC W/AUTO DIFF WBC: CPT | Performed by: NURSE PRACTITIONER

## 2022-10-13 PROCEDURE — 84443 ASSAY THYROID STIM HORMONE: CPT | Performed by: NURSE PRACTITIONER

## 2022-10-13 PROCEDURE — 99396 PREV VISIT EST AGE 40-64: CPT | Mod: 25 | Performed by: NURSE PRACTITIONER

## 2022-10-13 PROCEDURE — 99214 OFFICE O/P EST MOD 30 MIN: CPT | Mod: 25 | Performed by: NURSE PRACTITIONER

## 2022-10-13 PROCEDURE — 90677 PCV20 VACCINE IM: CPT | Performed by: NURSE PRACTITIONER

## 2022-10-13 PROCEDURE — 90472 IMMUNIZATION ADMIN EACH ADD: CPT | Performed by: NURSE PRACTITIONER

## 2022-10-13 PROCEDURE — 90686 IIV4 VACC NO PRSV 0.5 ML IM: CPT | Performed by: NURSE PRACTITIONER

## 2022-10-13 PROCEDURE — 83036 HEMOGLOBIN GLYCOSYLATED A1C: CPT | Performed by: NURSE PRACTITIONER

## 2022-10-13 RX ORDER — MONTELUKAST SODIUM 10 MG/1
1 TABLET ORAL AT BEDTIME
Qty: 90 TABLET | Refills: 2 | Status: SHIPPED | OUTPATIENT
Start: 2022-10-13 | End: 2023-10-16

## 2022-10-13 RX ORDER — VENLAFAXINE HYDROCHLORIDE 150 MG/1
150 CAPSULE, EXTENDED RELEASE ORAL DAILY
Qty: 90 CAPSULE | Refills: 1 | Status: SHIPPED | OUTPATIENT
Start: 2022-10-13 | End: 2023-04-10

## 2022-10-13 RX ORDER — MOMETASONE FUROATE AND FORMOTEROL FUMARATE DIHYDRATE 200; 5 UG/1; UG/1
AEROSOL RESPIRATORY (INHALATION)
Qty: 39 G | Refills: 0 | Status: SHIPPED | OUTPATIENT
Start: 2022-10-13 | End: 2023-04-10

## 2022-10-13 RX ORDER — BUSPIRONE HYDROCHLORIDE 15 MG/1
15 TABLET ORAL 2 TIMES DAILY
Qty: 180 TABLET | Refills: 0 | Status: SHIPPED | OUTPATIENT
Start: 2022-10-13 | End: 2022-11-15

## 2022-10-13 RX ORDER — MOMETASONE FUROATE AND FORMOTEROL FUMARATE DIHYDRATE 200; 5 UG/1; UG/1
AEROSOL RESPIRATORY (INHALATION)
Qty: 39 G | Refills: 0 | Status: SHIPPED | OUTPATIENT
Start: 2022-10-13 | End: 2022-10-13

## 2022-10-13 RX ORDER — ALBUTEROL SULFATE 90 UG/1
AEROSOL, METERED RESPIRATORY (INHALATION)
Qty: 17 G | Refills: 1 | Status: SHIPPED | OUTPATIENT
Start: 2022-10-13 | End: 2023-04-10

## 2022-10-13 ASSESSMENT — PATIENT HEALTH QUESTIONNAIRE - PHQ9
10. IF YOU CHECKED OFF ANY PROBLEMS, HOW DIFFICULT HAVE THESE PROBLEMS MADE IT FOR YOU TO DO YOUR WORK, TAKE CARE OF THINGS AT HOME, OR GET ALONG WITH OTHER PEOPLE: NOT DIFFICULT AT ALL
SUM OF ALL RESPONSES TO PHQ QUESTIONS 1-9: 3
SUM OF ALL RESPONSES TO PHQ QUESTIONS 1-9: 3

## 2022-10-13 ASSESSMENT — ASTHMA QUESTIONNAIRES
QUESTION_5 LAST FOUR WEEKS HOW WOULD YOU RATE YOUR ASTHMA CONTROL: COMPLETELY CONTROLLED
QUESTION_3 LAST FOUR WEEKS HOW OFTEN DID YOUR ASTHMA SYMPTOMS (WHEEZING, COUGHING, SHORTNESS OF BREATH, CHEST TIGHTNESS OR PAIN) WAKE YOU UP AT NIGHT OR EARLIER THAN USUAL IN THE MORNING: NOT AT ALL
QUESTION_4 LAST FOUR WEEKS HOW OFTEN HAVE YOU USED YOUR RESCUE INHALER OR NEBULIZER MEDICATION (SUCH AS ALBUTEROL): TWO OR THREE TIMES PER WEEK
ACT_TOTALSCORE: 21
ACT_TOTALSCORE: 21
QUESTION_2 LAST FOUR WEEKS HOW OFTEN HAVE YOU HAD SHORTNESS OF BREATH: ONCE OR TWICE A WEEK
QUESTION_1 LAST FOUR WEEKS HOW MUCH OF THE TIME DID YOUR ASTHMA KEEP YOU FROM GETTING AS MUCH DONE AT WORK, SCHOOL OR AT HOME: A LITTLE OF THE TIME

## 2022-10-13 ASSESSMENT — ENCOUNTER SYMPTOMS
WEAKNESS: 0
DIARRHEA: 0
NAUSEA: 0
ABDOMINAL PAIN: 0
BREAST MASS: 0
SORE THROAT: 0
FEVER: 0
HEMATURIA: 0
SHORTNESS OF BREATH: 0
FREQUENCY: 0
HEADACHES: 0
JOINT SWELLING: 0
DIZZINESS: 1
COUGH: 0
NERVOUS/ANXIOUS: 0
HEARTBURN: 0
PARESTHESIAS: 0
PALPITATIONS: 0
ARTHRALGIAS: 1
HEMATOCHEZIA: 0
MYALGIAS: 0
CONSTIPATION: 0
EYE PAIN: 0
DYSURIA: 0

## 2022-10-13 ASSESSMENT — PAIN SCALES - GENERAL: PAINLEVEL: NO PAIN (0)

## 2022-10-13 NOTE — LETTER
My Asthma Action Plan    Name: Barbara Yates   YOB: 1980  Date: 10/13/2022   My doctor: MANJULA Guerrero CNP   My clinic: [facility]        My Control Medicine: Mometasone furoate + formoterol (Dulera) -  200/5 mcg twice daily  My Rescue Medicine: Albuterol (Proair/Ventolin/Proventil HFA) 2-4 puffs EVERY 4 HOURS as needed. Use a spacer if recommended by your provider.   My Asthma Severity:   Mild Persistent  Know your asthma triggers: upper respiratory infections, dust mites, pollens and cold air               GREEN ZONE   Good Control    I feel good    No cough or wheeze    Can work, sleep and play without asthma symptoms       Take your asthma control medicine every day.     1. If exercise triggers your asthma, take your rescue medication    15 minutes before exercise or sports, and    During exercise if you have asthma symptoms  2. Spacer to use with inhaler: If you have a spacer, make sure to use it with your inhaler             YELLOW ZONE Getting Worse  I have ANY of these:    I do not feel good    Cough or wheeze    Chest feels tight    Wake up at night   1. Keep taking your Green Zone medications  2. Start taking your rescue medicine:    every 20 minutes for up to 1 hour. Then every 4 hours for 24-48 hours.  3. If you stay in the Yellow Zone for more than 12-24 hours, contact your doctor.  4. If you do not return to the Green Zone in 12-24 hours or you get worse, start taking your oral steroid medicine if prescribed by your provider.           RED ZONE Medical Alert - Get Help  I have ANY of these:    I feel awful    Medicine is not helping    Breathing getting harder    Trouble walking or talking    Nose opens wide to breathe       1. Take your rescue medicine NOW  2. If your provider has prescribed an oral steroid medicine, start taking it NOW  3. Call your doctor NOW  4. If you are still in the Red Zone after 20 minutes and you have not reached your  doctor:    Take your rescue medicine again and    Call 911 or go to the emergency room right away    See your regular doctor within 2 weeks of an Emergency Room or Urgent Care visit for follow-up treatment.          Annual Reminders:  Meet with Asthma Educator,  Flu Shot in the Fall, consider Pneumonia Vaccination for patients with asthma (aged 19 and older).    Pharmacy:    [facility]  [id number] - [place], [place] - [address] [address]  [facility], [place] - [address] [id number] [place] - [address] [facility] [address]  [id number] - [place], [place] - [address] AT [facility] [address]  [address]  [id number] - [place], [place] - [address] AT [facility] & [facility]    Electronically signed by MANJULA Guerrero CNP   Date: 10/13/22                      Asthma Triggers  How To Control Things That Make Your Asthma Worse    Triggers are things that make your asthma worse.  Look at the list below to help you find your triggers and what you can do about them.  You can help prevent asthma flare-ups by staying away from your triggers.      Trigger                                                          What you can do   Cigarette Smoke  Tobacco smoke can make asthma worse. Do not allow smoking in your home, car or around you.  Be sure no one smokes at a child s day care or school.  If you smoke, ask your health care provider for ways to help you quit.  Ask family members to quit too.  Ask your health care provider for a referral to Quit Plan to help you quit smoking, or call 3-585-557-PLAN.     Colds, Flu, Bronchitis  These are common triggers of asthma. Wash your hands often.  Don t touch your eyes, nose or mouth.  Get a flu shot every year.     Dust Mites  These are tiny bugs that live in cloth or carpet. They are too small to see. Wash sheets and blankets in hot water  every week.   Encase pillows and mattress in dust mite proof covers.  Avoid having carpet if you can. If you have carpet, vacuum weekly.   Use a dust mask and HEPA vacuum.   Pollen and Outdoor Mold  Some people are allergic to trees, grass, or weed pollen, or molds. Try to keep your windows closed.  Limit time out doors when pollen count is high.   Ask you health care provider about taking medicine during allergy season.     Animal Dander  Some people are allergic to skin flakes, urine or saliva from pets with fur or feathers. Keep pets with fur or feathers out of your home.    If you can t keep the pet outdoors, then keep the pet out of your bedroom.  Keep the bedroom door closed.  Keep pets off cloth furniture and away from stuffed toys.     Mice, Rats, and Cockroaches   Some people are allergic to the waste from these pests.   Cover food and garbage.  Clean up spills and food crumbs.  Store grease in the refrigerator.   Keep food out of the bedroom.   Indoor Mold  This can be a trigger if your home has high moisture. Fix leaking faucets, pipes, or other sources of water.   Clean moldy surfaces.  Dehumidify basement if it is damp and smelly.   Smoke, Strong Odors, and Sprays  These can reduce air quality. Stay away from strong odors and sprays, such as perfume, powder, hair spray, paints, smoke incense, paint, cleaning products, candles and new carpet.   Exercise or Sports  Some people with asthma have this trigger. Be active!  Ask your doctor about taking medicine before sports or exercise to prevent symptoms.    Warm up for 5-10 minutes before and after sports or exercise.     Other Triggers of Asthma  Cold air:  Cover your nose and mouth with a scarf.  Sometimes laughing or crying can be a trigger.  Some medicines and food can trigger asthma.
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
## 2022-10-17 LAB
BKR LAB AP GYN ADEQUACY: NORMAL
BKR LAB AP GYN INTERPRETATION: NORMAL
BKR LAB AP HPV REFLEX: NORMAL
BKR LAB AP LMP: NORMAL
BKR LAB AP PREVIOUS ABNL DX: NORMAL
BKR LAB AP PREVIOUS ABNORMAL: NORMAL
PATH REPORT.COMMENTS IMP SPEC: NORMAL
PATH REPORT.COMMENTS IMP SPEC: NORMAL
PATH REPORT.RELEVANT HX SPEC: NORMAL

## 2022-10-20 ENCOUNTER — PATIENT OUTREACH (OUTPATIENT)
Dept: FAMILY MEDICINE | Facility: CLINIC | Age: 42
End: 2022-10-20

## 2022-10-20 ENCOUNTER — ANCILLARY PROCEDURE (OUTPATIENT)
Dept: MAMMOGRAPHY | Facility: CLINIC | Age: 42
End: 2022-10-20
Attending: NURSE PRACTITIONER
Payer: COMMERCIAL

## 2022-10-20 DIAGNOSIS — Z12.31 ENCOUNTER FOR SCREENING MAMMOGRAM FOR BREAST CANCER: ICD-10-CM

## 2022-10-20 PROCEDURE — 77067 SCR MAMMO BI INCL CAD: CPT | Mod: GC | Performed by: STUDENT IN AN ORGANIZED HEALTH CARE EDUCATION/TRAINING PROGRAM

## 2022-10-20 PROCEDURE — 77063 BREAST TOMOSYNTHESIS BI: CPT | Mod: GC | Performed by: STUDENT IN AN ORGANIZED HEALTH CARE EDUCATION/TRAINING PROGRAM

## 2022-11-01 DIAGNOSIS — G47.9 DISTURBANCE IN SLEEP BEHAVIOR: ICD-10-CM

## 2022-11-04 RX ORDER — TRAZODONE HYDROCHLORIDE 50 MG/1
TABLET, FILM COATED ORAL
Qty: 30 TABLET | Refills: 11 | Status: SHIPPED | OUTPATIENT
Start: 2022-11-04 | End: 2023-01-17

## 2022-11-04 NOTE — TELEPHONE ENCOUNTER
Prescription approved per [facility] Refill Protocol.  MANOJ GongN, RN, PHN  [place]  November 4, 2022

## 2022-11-08 ENCOUNTER — VIRTUAL VISIT (OUTPATIENT)
Dept: NUTRITION | Facility: CLINIC | Age: 42
End: 2022-11-08
Attending: NURSE PRACTITIONER
Payer: COMMERCIAL

## 2022-11-08 DIAGNOSIS — R63.5 WEIGHT GAIN: Primary | ICD-10-CM

## 2022-11-08 PROCEDURE — 97802 MEDICAL NUTRITION INDIV IN: CPT | Mod: 95 | Performed by: DIETITIAN, REGISTERED

## 2022-11-08 NOTE — LETTER
11/8/2022         RE: Barbara Yates  [address]  [facility] [address]        Dear Colleague,    Thank you for referring your patient, Barbara Yates, to the nutrition services at [facility]. I enjoyed meeting with Barbara and helping her create nutrition plan with recipes to meet her weight loss goals. Please see a copy of my visit note below for more details. I look forward to following up with her in December to monitor progress.     Thanks,   Rand     Medical Nutrition Therapy  Visit Type: Initial assessment and intervention    Visit Details    How would you like to obtain your AVS? MyChart  If the correspondence for visit is dropped, how would you like your dietitian to reconnect with you:   call back by phone? Yes    Text to cell phone: [phone]  Will anyone else be joining your video visit or telephone call? No  {If patient encounters technical issues they should call [phone]    Type of service:  Video Visit or Telephone     Start Time: 2:35 PM    End Time:3:36 PM    Originating Location (pt. Location): Home    Distant Location (provider location):  [facility] WORKING VIRTUAL FROM HOME     Platform used for Video Visit: Macie      Referring Provider: Nicole oSto  [facility]     REASON FOR REFERRAL:   Barbara Yates is a 42 year old female who is interested in Medical Nutrition Therapy (MNT) and education related to weight management.   She is accompanied by self.     How to incorporate more protein. She is trying to get more nutrients from the food that she eats instead of having to just take supplements. She tried taurus and more keto based. She tried the meal replacements. She got the keto flu and don't having naturals sugars and more artificial sugars didn't fit well. Her background is  and its harder to go lower carb and cant cut certain things out of her diet. She doesn't know how to read  labels. Just doesn't know and needs to find the balance. Less likely to fall of the strict and ridged plan. Worry about the holidays coming up with baking this and that. Needs to look into the moderation and what exactly and understand nutrition better. Does she need to be concerned about macros vs calories and all these different things ways of foods, how to read them and what is better for weight loss. Toss out all the things you can't have an diets start focusing on what you can have. No subscription, strict foods you have to use. More flexibility an recipes to try out and tweak to her own liking. Got sick after cutting everything cold turkey. She shocked her system and got shocked. Quitting the every day foods used to and going to something else didn't work. Being more aware of foods now and pay attention to the label and macro or carb counts ex for oat milk.     You know for her I feel like getting down to 120lbs would be easier for her. She isn't sure of her current weight but constantly having low energy and never used to have that before. She has a lot of joint problems. Her stamina just isn't the same. She just wants to exercise and eat healthier and see where it takes her. Having a more healthy lifestyle. She is borderline diabetic.     Has coffee for breakfast skipping food usually or goes out every other day to so something quick and easy will do caribou mocha with oatmilk or milk cinnamon     First meal around lunch hour 11am - usually has salad with a protein in the office and if crunched on time has ramen noodles with egg if she gets a chance to add.     Sometimes does carrots with dressing as snack    Dinner depends she will get a salad or soup from chick faley or sweets or fried egg with rice or stir ramírez with rice or chili just depends on the eveing more often home cooked meals than running out and getting something      NUTRITION ASSESSMENT:   No flowsheet data found.     Neurological 11/8/2022    Tension Headache Current       No flowsheet data found.   No flowsheet data found.   No flowsheet data found.    No flowsheet data found.   No flowsheet data found.   Skin 11/8/2022   Acne Current   Dry Skin Current      No flowsheet data found.   No flowsheet data found.   Psychological 11/8/2022   Depression/Anxiety Past;Current      Genitourinary 11/8/2022   PMS Past;Current      Womens Health Assessment 11/8/2022   Hysterectomy No   I am pregnant.  No   I am breastfeeding. No   I want to become pregnant within the next year . No   What do you use for contraception?  IUD   Any problems with current birth control method?   No   Date of last menstrual period 41587   Are your periods irregular? No   Days from the start of one period to the next. 45   Days of Menstral Flow 1   Associated with menstral periods. Headaches;Mood swings;Bloating;Breast tenderness   Are you officially in menopause? (no period for one year or longer)  No        Past Medical History:  Past Medical History:   Diagnosis Date     Anxiety      Attention deficit disorder with hyperactivity(314.01)      Cervical high risk HPV (human papillomavirus) test positive     11/28/05, 6/14/06, 9/20/21     Depression      Depressive disorder Unknown     H/O colposcopy with cervical biopsy 06/07/2005    ECC- within normal limits. no dysplasia     H/O colposcopy with cervical biopsy 07/13/2006    biopsies and ECC- within normal limits. no dysplasia noted.     Infectious mononucleosis 01/01/2004     LSIL (low grade squamous intraepithelial lesion) on Pap smear 05/06/2005 9/20/21       Previous Surgeries:   Past Surgical History:   Procedure Laterality Date     ARTHROSCOPY KNEE RT/LT Left 09/08/2016     AS CLOSED TX NASAL BONE FRACTURE W/ STABILIZATION  03/01/2016     EXAM OF VAGINA,COLPOSCOPY  2006, 2005     EYE SURGERY  01/01/2008    Lasik eye surgery     ORTHOPEDIC SURGERY  9/8/16 and 12/12/16    knee/tibia        Family History:  Family History    Problem Relation Age of Onset     Hypertension Mother      Diabetes Mother      Unknown/Adopted Father      Diabetes Father      Family History Negative Other         Lifestyle History:  Lifestyle 11/8/2022   Do you feel your life is stressful right now?  No   Are you currently implementing any strategies to help manage stress? Yes   What are you doing to manage stress?  Meditation;Breathing exercises;Affirmations;Aromatherapy;Counseling;Journaling;Indulge in food;Listening to music;Sleep;Gratitude        Exercise History:  Exercise 11/8/2022   Does your occupation require extended periods of sitting?  Yes   Does your occupation require extended periods of repetitive movements (ex: walking or lifting)?  Yes   Do you currently participate in any forms of exercise? No   Rate your level of motivation for including exercise in your routine (0=none, 10=high): 8   Do you have any medical conditions, pain, injuries, surgeries etc. restricting you from exercise? No        Sleep History:  Sleep 11/8/2022   How many hours (on average) do you sleep per night? 7-9   What time do you turn off the lights? 10 PM   How long does it take for you to fall asleep? 25-30 mins   What time do you stop using electronic devices? 11 PM   What time do you wake up? 6 AM   When do you eat your first meal?  9 AM   Do you feel well-rested during the day?  No   Do you take naps?  Yes   Do you have a comfortable bedroom environment (cool, quiet, dark, etc)? Yes   Do you have a sleep routine/ ritual that you do before bed?  Yes   How many hours do you spend per day looking at a screen (TV, computer, tablet and phone)? 10 +   Select all factors that apply to your current sleep habits: Difficulty falling asleep;Have difficulty waking up in the morning;Not hungry in the morning;Take medication for sleep on most night of the week;Snore loudly or have been told you stop breathing;Night sweats;Drink coffee, soda or energy drinks after noon;Have tried  supplements (ie: melatonin) for sleep;Grinding teeth        Nutrition History:  Nutrition 11/8/2022   Have you ever had a nutrition consultation? Yes   Do you currently follow a special diet or nutritional program? No   What do you feel are the biggest barriers getting in the way of achieving you nutritional goals? Motivation/Readiness to change;Financial concerns;Lack of social support;Lack of time;Lack of prep/cooking skills;Work (such as lack of time to eat, unhealthy choices, etc.);Lack of nutrition knowledge;TV/computer/social media   Do you have any food allergies, sensitivities or intolerances?  No       Digestion 11/8/2022   Do you experience stomach pains/cramping? Monthly   Do you experience bloating?  Weekly   Do you experience gas?  Daily   Do you experience heartburn/acid reflux/indigestion? Rarely   How often do you have a bowel movement? Every other day   What is a typical bowel movement like for you? Select all that apply: Varies a lot      Food Access:  11/8/2022   Who does the grocery shopping? Self;Mother;Spouse/Partner;Family member   How often is grocery shopping done? 2 times per week   Where do you usually receive your groceries from? Select all that apply: [employer]   Do you read food labels? No   Who does the cooking? Select all that apply: Self;Mother;Spouse/Partner;Family Member   How many meals do you eat out per week?  3 to 5   What restaurants do you typically choose? Fast Food (Taco Bell, Shell's, Subway, etc.)      Daily Patterns: 11/8/2022   How many days per week do you have breakfast? 1   How many days per week do you have lunch? 6   How many days per week do you have dinner? 7   How many days per week do you have snacks? 5      Protein Intake: 11/8/2022   How many times per day do you typically consume a protein source(s)? 2   What types of protein do you currently eat?  Ground Beef;Beef Ribs;Steak;Hamburgers;Beef Roast;Pork  Chops;Pork Ribs;Mckeon/[place] Mckeon;Deli Ham;Sausage;Other Pork;Arboles;Tilapia;Tuna;Shrimp;Lobster;Other Fish;Chicken Breast;Chicken Thighs/Legs;Ground Chicken;Turkey Breast;Deli Turkey;Eggs;Tofu       Fat Intake:  11/8/2022   How many times per day do you typically consume healthy fat(s)? 1   What types of health fats do you currently eat? Select all that apply:  Almonds;Walnuts;Sunflower seeds;Peanut butter;Velazquez;Margarine;Butter;Vegetable oil;Salad dressings;Avocado oil;Olive oil;Avocado       Fruit Intake:  11/8/2022   How many times per day do you typically consume fruits? 1   What types of fruit do currently eat? Apple;Banana;Blackberries;Blueberries;Cantaloupe;Cherries;Grapes;Grapefruit;Honeydew;Fossil;Melon;Nectarine;Pears;Peaches;Pineapple;Plums;Raspberries;Tangerines;Watermelon       Vegetable Intake:  11/8/2022   How many times per day do you typically consume vegetables? 1   What types of vegetables do you currently eat? Artichoke;Asparagus;Beans;Broccoli;Cabbage;Carrots;Cauliflower;Celery;Corn;Cucumber;Green onions/scallions;Greens (jluis, kale etc);Peas;Potato (baked, boiled, mashed, French fries);Radish;Soy beans;Spinach;Squash (summer, zucchini);Sugar snap peas;Tomato;Yam, sweet potato      Grain Intake:  11/8/2022   How many times per day do you typically consume grains? 3   What types of grains do currently eat? Select all that apply:  Bagel (non-gluten free);Breads (non-gluten free);Cereals (non-gluten free);Chips (non-gluten free);Crackers (non-gluten free);Muffins (non-gluten free);Pancakes/waffles (non-gluten free);Pasta (non-gluten free);Pretzels (non-gluten free);Other (non-gluten free)       Dairy Intake:  11/8/2022   How many times per day do you typically consume dairy? 2   What types of dairy do currently eat? Select all that apply:  Milk;Cheese;Yogurt;Ice cream       Non-Dairy Alternative Intake:  11/8/2022   How many times per day do you typically consume non-dairy alternatives? 1    What types of non-dairy alternatives do currently eat? Select all that apply:  Oat milk       Sweets Intake:  11/8/2022   How many times per day do you typically consume sweets? 3      Beverage Intake:  11/8/2022   How many 8 oz cups of water do you typically consume per day?  4 to 5   How many 8 oz cups of caffeine do you typically consume per day?  3 to 4   How many drinks of alcohol do you typically consume per week (1 drink = 5 oz wine, 12 oz beer, 1.5 oz spirits)?   1 to 3       Lifestyle Recall:  11/8/2022   What time did you wake up? 7 AM   What time did you go to sleep? 12 AM   What time did you have breakfast? 9-10 AM   Where did you have breakfast?  Work   What time did you have a morning snack? No snack   What time did you have lunch? 11AM-12 PM   Where did you have lunch?  Work   What time did you have an afternoon snack? No snack   What time did you have dinner? 6-7 PM   Where did you have dinner?  Home   What time did you have an evening snack? No Snack   What time of day did you exercise? 6 PM   Where did you exercise? Gym/Fitness Center          Additional concerns:    MEDICATIONS:  Current Outpatient Medications   Medication Sig Dispense Refill     albuterol (PROAIR HFA/PROVENTIL HFA/VENTOLIN HFA) 108 (90 Base) MCG/ACT inhaler USE 2 INHALATIONS EVERY 6 HOURS MCG/ACT 17 g 1     busPIRone (BUSPAR) 15 MG tablet Take 1 tablet (15 mg) by mouth 2 times daily 180 tablet 0     fexofenadine (ALLEGRA) 180 MG tablet Take 180 mg by mouth daily       levonorgestrel (KYLEENA) 19.5 MG IUD 1 each (19.5 mg) by Intrauterine route once for 1 dose 1 each 0     mometasone-formoterol (DULERA) 200-5 MCG/ACT inhaler USE 2 INHALATIONS TWICE A DAY Strength: 200-5 MCG/ACT 39 g 0     montelukast (SINGULAIR) 10 MG tablet Take 1 tablet (10 mg) by mouth At Bedtime 90 tablet 2     traZODone (DESYREL) 50 MG tablet TAKE 1 TABLET AT BEDTIME AS NEEDED (NEED  TO BE SEEN FOR FURTHER REFILLS) 30 tablet 11     triamcinolone (KENALOG)  0.1 % external ointment Apply topically 2 times daily (Patient not taking: Reported on 10/13/2022) 30 g 0     venlafaxine (EFFEXOR XR) 150 MG 24 hr capsule Take 1 capsule (150 mg) by mouth daily 90 capsule 1       Dietary Supplements 11/8/2022   Individual Vitamin Supplements General multivitamin         ALLERGIES:   Allergies   Allergen Reactions     Seasonal Allergies         .na  LABS:  Last Basic Metabolic Panel:  Lab Results   Component Value Date     10/08/2020     05/05/2011     02/08/2011      Lab Results   Component Value Date    POTASSIUM 4.0 10/08/2020    POTASSIUM 3.9 05/05/2011    POTASSIUM 3.5 02/08/2011     Lab Results   Component Value Date    CHLORIDE 107 10/08/2020    CHLORIDE 104 05/05/2011    CHLORIDE 100 02/08/2011     Lab Results   Component Value Date    MARYSE 8.9 10/08/2020    MARYSE 8.6 05/05/2011    MARYSE 8.6 02/08/2011     Lab Results   Component Value Date    CO2 31 10/08/2020    CO2 25 05/05/2011    CO2 22 02/08/2011     Lab Results   Component Value Date    BUN 8 10/08/2020    BUN 7 05/05/2011    BUN 5 02/08/2011     Lab Results   Component Value Date    CR 0.76 10/08/2020    CR 0.48 05/05/2011    CR 0.64 02/08/2011     Lab Results   Component Value Date    GLC 91 10/08/2020    GLC 95 10/18/2019    GLC 94 09/21/2018       Last Glucose Profile:   Hemoglobin A1C   Date Value Ref Range Status   10/13/2022 5.5 0.0 - 5.6 % Final     Comment:     Normal <5.7%   Prediabetes 5.7-6.4%    Diabetes 6.5% or higher     Note: Adopted from ADA consensus guidelines.   09/20/2021 5.6 0.0 - 5.6 % Final     Comment:     Normal <5.7%   Prediabetes 5.7-6.4%    Diabetes 6.5% or higher     Note: Adopted from ADA consensus guidelines.   10/08/2020 5.2 0 - 5.6 % Final     Comment:     Normal <5.7% Prediabetes 5.7-6.4%  Diabetes 6.5% or higher - adopted from ADA   consensus guidelines.         Last Lipid Profile:   Cholesterol   Date Value Ref Range Status   10/13/2022 155 <200 mg/dL Final    09/20/2021 171 <200 mg/dL Final   10/08/2020 171 <200 mg/dL Final   10/18/2019 147 <200 mg/dL Final   09/21/2018 147 <200 mg/dL Final     HDL Cholesterol   Date Value Ref Range Status   10/08/2020 49 (L) >49 mg/dL Final   10/18/2019 53 >49 mg/dL Final   09/21/2018 53 >49 mg/dL Final     Direct Measure HDL   Date Value Ref Range Status   10/13/2022 54 >=50 mg/dL Final   09/20/2021 51 >=50 mg/dL Final     LDL Cholesterol Calculated   Date Value Ref Range Status   10/13/2022 90 <=100 mg/dL Final   09/20/2021 101 (H) <=100 mg/dL Final   10/08/2020 99 <100 mg/dL Final     Comment:     Desirable:       <100 mg/dl   10/18/2019 76 <100 mg/dL Final     Comment:     Desirable:       <100 mg/dl   09/21/2018 81 <100 mg/dL Final     Comment:     Desirable:       <100 mg/dl     Triglycerides   Date Value Ref Range Status   10/13/2022 55 <150 mg/dL Final   09/20/2021 97 <150 mg/dL Final   10/08/2020 113 <150 mg/dL Final   10/18/2019 90 <150 mg/dL Final   09/21/2018 63 <150 mg/dL Final     Comment:     Fasting specimen     Cholesterol/HDL Ratio   Date Value Ref Range Status   06/18/2015 2.6 0.0 - 5.0 Final   06/17/2009 2.4 0.0 - 5.0 Final       Most recent CBC:  Recent Labs   Lab Test 10/13/22  1205 09/20/21  1316 10/08/20  0901   WBC 6.7 8.0 6.5   HGB 12.0 12.1 12.0   HCT 36.1 36.5 37.4    270 281     Most recent hepatic panel:  No lab results found.    Invalid input(s): TAM, ALP  Most recent creatinine:  Recent Labs   Lab Test 10/08/20  0901   CR 0.76       No components found for: GFRESETIMATEDLASTLAB(gfrestblack:1@  Lab Results   Component Value Date    ALBUMIN 4.5 06/17/2009       Last Thyroid Profile:   TSH   Date Value Ref Range Status   10/13/2022 1.01 0.40 - 4.00 mU/L Final   09/20/2021 1.50 0.40 - 4.00 mU/L Final   10/08/2020 1.27 0.40 - 4.00 mU/L Final   10/18/2019 1.26 0.40 - 4.00 mU/L Final   09/21/2018 1.43 0.40 - 4.00 mU/L Final       Last Mineral Profile:   Ferritin   Date Value Ref Range Status  "  10/08/2020 38 12 - 150 ng/mL Final     Iron   Date Value Ref Range Status   10/08/2020 85 35 - 180 ug/dL Final     Iron Binding Cap   Date Value Ref Range Status   10/08/2020 294 240 - 430 ug/dL Final       Autoimmune & Inflammatory   No results found for: CRP      Last Vitamin Profile:   No results found for: HOU327, MBXD483, [id number], VITD3, D2VIT, D3VIT, DTOT, [id number], [id number], [id number], [id number], [id number], [id number]    ANTHROPOMETRICS:  Vitals:   BP Readings from Last 1 Encounters:   10/13/22 116/78     Pulse Readings from Last 1 Encounters:   10/13/22 77     Estimated body mass index is 25.25 kg/m  as calculated from the following:    Height as of 10/13/22: 1.595 m (5' 2.8\").    Weight as of 10/13/22: 64.2 kg (141 lb 9.6 oz).    Wt Readings from Last 5 Encounters:   10/13/22 64.2 kg (141 lb 9.6 oz)   07/30/22 61.9 kg (136 lb 6 oz)   03/21/22 61.4 kg (135 lb 7 oz)   12/09/21 62.1 kg (137 lb)   10/28/21 62.8 kg (138 lb 6.4 oz)       NUTRITION DIAGNOSIS:   1. Altered nutrition-related laboratory values related to endocrine dysfunction as evidenced by elevated BMI, HbA1c     2. Altered nutrition-related laboratory values related to cardiac as evidenced by low HDL.          NUTRITION INTERVENTION:         Long Term Goals:   Goal: Lipid profile; Increase HDL > 49mg/dL within 6 months   Goal: Increase healthy relationship with food   Goal: Lose 20-24lbs in 6 months, recommend average 1-2lbs per week of weight loss.           Short Term Goals:  Goal 1: Focus on smoothie for breakfast, ---add in unsweetened flax, hemp or coconut milk, 1 tbsp of healthy fat such as flax seed ground or juanita seed ground. serving of fruit 1 cup of berries. Veggie (optional), 1 scoop of plant based protein powder or collagen powder, 1 serving of fruit and veggie such as kale, spinach, or veggie powder even spirulina - see recipes below or details)      Also, check out some of the other breakfast ideas provided such as " oatmeal (naturally gluten free) with dairy free alternative milk, flax seed, nuts such as walnuts, fruit such as berries and protein like eggs or chicken/turkey sausage.      Goal 2:  Make sure to eat consistent meals daily with focus on balanced lunch and at least 1-2 snacks daily around 10am and 3pm.  Eating every three to four hours will help keep your insulin and blood sugar normal to help with weight loss  - see meal plan and recipe ideas in the cardi metabolic guides provided.       2 hard boiled eggs.     Greek Yogurt with Blackberries    Fresh Yellow Pear or veggie (radishes, bell peppers, baby cucumbers, carrots or sugar snap peas) with Hummus - try the mini to go packs to help with portion control     Marinated Olives* and Kefir     Purple Plum with Mixed Nuts    Celery with Commerce Butter    Dark Chocolate, 70% or higher Cocoa and Pistachio Nuts    Banana with almond butter or sunflower seed butter     Apple with peanut butter topped with cinnamon     Baby cucumber or carrots, bell pepper, radishes or sugar snap peas with individual servings of guac     Low sodium cottage with peaches or mandarin oranges     Sunflower seeds with strawberries     Turkey jerky or 100% grass fed beef jerky     Plant based protein bar     Simple Mills Commerce Flour Crackers or Shirlene Gone Cracker dip with hummus or guac     Avocado on slice of gluten free bread recommend base culture brand or 100% whole grain bread    Goal 4: Monitor portion sizes   - consider just having 1 serving of grains per day and swap for lower carb/sugar options such as non-starchy veggies. Read labels for low sugar and carbs.    - consider having alcohol in moderation and or avoiding. Alcohol can increase triglycerides and fat in the liver and create blood sugar imbalances leading to weight gain.      Goal 5: Recommend the following supplements to start     One Multivitamin by Pure encapsulations - 1 capsule per day with food     Nordic Naturals Plus  vitamin D - 1-2 capsules daily with food ----[email or web address]     Look on amazon to find the supplement recommendations or talk with the [facility] to special order. They may be able to offer a discount as well.        Smoothie Recipes to Kickoff Your Success!!     Pineapple Coconut Smoothie      Makes 1-2 serving     1-2 cup unsweetened coconut milk   1 scoop mckeon vanilla plant based protein powder or collagen protein powder by vital proteins (great for just having no flavor - plain)   1 cup frozen pineapple chunks   1 handful spinach (optional)     banana (optional - if added use less pineapple so not as high of sugar content)   1 tbsp shredded unsweetend coconut to top (optional)  1 tbsp ground flax seed (optional)     Method:     In  (recommend vitamix or blend tech) add dairy free alternative milk or filtered water. Add in the rest of ingredients blend on high for 2 mins. If desire thinner consistency add in the water or dairy alternative. Enjoy :)    Kiwi Spirulina Smoothie      Makes 1 servings     1 cup unsweetened macadamia nut milk or filtered water   1 scoop mckeon vanilla plant based protein powder or 1-2 scoops collagen powder from vital proteins (plain)   1-2 Kiwi s peeled   1 handful cilantro   1 lime juiced    - 1 avocado   2 tsp spirulina   1 inch eduard fresh pilled      Method:     In  (recommend vitamix or blend tech) add dairy free alternative milk or filtered water. Add in the rest of ingredients blend on high for 2 mins. If desire thinner consistency add in the water or dairy alternative. Enjoy :)     Green Detox Smoothie      Makes 1 servings     1 cup unsweetened macadamia nut milk or filtered water (I like filtered water more for this recipe)  1-2 scoops collagen powder from vital proteins (plain)   1 small granny del castillo apple (prefer organic - lower pesticides and endocrine disruptors)   1 handful cilantro   1 lime  juiced    - 1 avocado (optional)   1 cucumber      Method:     In  (recommend vitamix or blend tech) add dairy free alternative milk or filtered water. Add in the rest of ingredients blend on high for 2 mins. If desire thinner consistency add in the water or dairy alternative. Enjoy :)        Spirulina Banana Smoothie      Makes 1 serving     1-2 cup unsweetened macadamia nut milk or filtered water (I like filtered water more for this recipe)  1 scoop mckeon vanilla plant based protein powder     banana   1 handful spinach    - 1 avocado  1-2 tsp spirulina    1 tbsp ground flax seed      Method:     In  (recommend vitamix or blend tech) add dairy free alternative milk or filtered water. Add in the rest of ingredients blend on high for 2 mins. If desire thinner consistency add in the water or dairy alternative. Enjoy :)     Berry Blast Smoothie      Makes 1 serving     1-2 cup unsweetened macadamia nut milk or filtered water (I like filtered water more for this recipe)  1 scoop mckeon vanilla plant based protein powder or collagen protein powder by vital proteins (plain)   1 cup mixed berries    1 handful spinach    - 1 avocado  1-2 tsp raw organic maqui berry powder by Sunfood superfoods (optional but very immune boosting and jammed with phytonutrients)   1 tbsp ground flax seed      Method:     In  (recommend vitamix or blend tech) add dairy free alternative milk or filtered water. Add in the rest of ingredients blend on high for 2 mins. If desire thinner consistency add in the water or dairy alternative. Enjoy :)     Superfood + Immune Boosting Powders:  Maqui Berry Powder   Turmeric Powder   Spirulina Powder   Glutamine Powder   Matcha Powder   Sammie fresh or powdered   Kale, dandelion greens or spinach         Omega 3 and Protein Desired Toppings:   Add some healthy protein and omega 3 packed seeds for an extra special nutrient packed topping and a little extra crunch!   1 tsp -1 tbps ground  flax seed   1 tsp -1 tbsp hemp seeds   1 tsp-1 tbsp juanita seeds      Mediterranean Approach: Eat whole, unprocessed real foods in their unprocessed forms such as fruits, vegetables, whole grains (prefer gluten free), nuts, legumes, extra virgin olive oil, spices, modest amounts of poultry, and fish.      Avoid inflammatory foods: Eliminate gluten found in wheat, barley, rye, oats, kamut, and spelt for at least 3 weeks to identify any hidden reaction. Gluten sensitivity or allergy can cause many different types of symptoms form migraines to fatigue to weight gain.  If symptoms go away this is a clue you may be reactive to gluten.  Dairy can also be inflammatory consider eliminating for 3 weeks as well. The proteins like casein and whey in dairy can irritate and inflame your gut. Also the sugar lactase in dairy can cause digestive issues in addition to blood sugar spikes.     Cardiometabolic Food plan - 1,800-2,200 calories per day     Protein 10-12 servings per day - include at each meal to stabilize blood sugars   (Choose 3oz or 21g per meal and aim for 1oz of 7g for snacks)   - Strive for 1-2 servings of fish per week especially of higher omega-3 fatty acid containing fish such as salmon.     Legumes <2 serving per day     Dairy alternatives 2-3 serving per day     Nuts and seeds 3-4 servings per day - great to incorporate as snacks    Fats and oils 4 servings per day     Non starchy vegetables 8-10 servings per day     Starchy vegetable limit 1 serving per day as they tend to impact blood sugar (they are moderate-GI).     Fruits 2 servings per day - best to couple with a little bit of protein or fat to offset a rise in blood sugars (they are low-moderate-GI foods).     Whole grains <2 serving per day - try gluten free whole grains instead        Incorporate protein powder daily:    Plant based hemp (recommended brands: [place], Nutiva, Just Hemp Protein, [employer] [place])      Plant based pea (recommended  brands: Naked Pea, Now Sports). If you want to try a combo of pea and hemp the brand Poe in vanilla or chocolate is a great option.     Try Bone broth protein powder or collagen peptides in liquid bone broth, vegetable broth or 12 oz of water as snack. The bone broth powder and collagen can be used for soups as well. This can help provide essential amino acids and minerals that heal your gut as well as balance blood sugars. A great option if you have a hard time tolerating solids.     Can also consider pre made shakes if unable to make smoothies.      Brand examples that are gluten and dairy free to try:   1) Orgain Vegan Protein Shakes, 20g of Plant Based Protein, Creamy Chocolate - Gluten Free, No Dairy, Soy, or Preservatives, No Added Sugar  2) Pirq, Vegan Protein Shake, Turmeric Curcumin, Huma, Plant-Based Protein Drink, Gluten-Free, Dairy-Free, Soy-Free, Non-GMO, Vegetarian, Kosher, Keto, Low Carb, Low Calorie  3) [employer] Pro Elite Vegan Plant-Based High Protein Shake, 35g Protein, 9 Amino Acids, Omega-3, Prebiotics, Superfoods Greens for Workout and Recovery, 0g Net Carbs, Zero Sugar, Keto  4) Ripple Vegan Protein Shake  Chocolate  20g Nutritious Plant Based Pea Protein  Shelf Stable  No GMOs, Soy, Nut, Gluten, Lactose  5) Sensobi Glucose Support 1.2 Plant based, dairy free, low glycemic index  https://"[employer].Bracketz/products/glucose-support-1-2-vanilla    Choose Low Glycemic (GI) foods: Regulate your sugar levels by eating foods that do not spike blood sugars.  Eat low -GI foods so only small fluctuations in blood glucose and insulin levels are produced.      Examples of low-GI foods: nuts, seeds, GF oats, most vegetables especially non-starchy and fruits.     Medium or high-GI foods should be eaten with a protein or fat, both of which blunt the glycemic effect of these foods. This reduces the overall glycemic impact of a meal.   Ex: Most grains and starchy veggies are medium/high GI.     Avoid foods  containing refined sugars, artificial sweeteners, and refined grains they are considered high-GI because they lead to sharp increases in blood sugars levels, which increase insulin sensitivity causing increased TG, and low good cholesterol (HDL).   Ex: cakes, cookies, pies, bread, sodas, fruit drinks, presweetened tea, coffee drinks, energy or sport drinks, flavored milk and other processed foods.     Choose foods high in fiber: Aim for at least 5 grams of fiber per serving of food or a total of 25-35 grams fiber per day. Remember, when looking at the label, you can take the fiber away from the total carbs. Ex:15g of total carbs - 4g of fiber = 11g net carbs     Insoluble fiber acts like a bulky  inner broom,  sweeping out debris from the intestine and creating more motility and movement.      Soluble fiber attracts water and swells, creating a gel that slows digestion.  Also, slows the release of glucose from foods into the blood which stops spikes in blood sugar levels.  Soluble fiber traps toxins in the gut, helping to carry them to excretion and provides healthy bacteria in the digestive tract.     Choose High Quality Fats: Adding anti-inflammatory fats into your diet such as fish (salmon, herring, mackerel, and sardines), omega 3 eggs, juanita seeds, ground flax seeds/milk, hemp seeds/milk, walnuts and some other certain leafy greens will increase omega-3 fats to omeaga-6 fats ratio.     Therapeutic fats both monounsaturated and polyunsaturated to include daily: ground flaxseeds, unsalted mixed nuts, avocados, olives, extra-virgin olive oil.     Emphasize high-quality oils and fats in the diet daily such as avocado oil, coconut oil, flaxseed, olive, sesame. Ex: 1 tsp to 1 tbsp of MCT oil from coconuts can be added into coffee, smoothies, and salad dressings per day.     Avoid trans fats found in processed foods     Drink more water. Hydration is critical, so drink at least six to eight glasses of water a day.  Drink more water between meals and less at meals.     Try adding herbal teas (sugar free) or lemon/lime/cucumber/fruit to water for flavor. Avoid artificial sweetener packets to flavor your water.     Cut back on coffee switch to green tea. Avoid adding sugar and milk to coffee instead use dairy alternatives such as almond, flax, coconut milk.     Try another coffee substitute such as   -https://[employer]/  -https://Brand.net.Matchmove    Focus on high quality micro-nutrients.     One Multivitamin by Pure encapsulations - 1 capsule per day with food     Nordic Naturals Plus vitamin D - 1-2 capsules daily with food    Rebuild the friendly bacteria in your microbime. Start by taking a probiotic supplement, they will help rebuild the healthy bacteria so essential to good gut health.     Recommend one of the following high quality probiotics:   1. BIOHM health (30 billion cfus per capsule) from [employer] to help break down digestive plaque and get in amylase and variety of good bacteria.  Does not need to be refrigerated great for traveling. Take 1 capsule daily anytime with our without food   2. VISBIOME (112.5 billion bacteria per capsule needs to be refrigerated).  Take 1 capsule daily anytime with our without food.     In addition you can help establish healthy gut microflora by consuming foods that contain live active cultures ( probiotics ) such as kimchi,     Increase physical activity. Moving our body helps move our bowels and speeds up your metabolism.     Exercise 15-60 minutes daily--whether that looks like burst training, yoga, or vigorous walking.    Manage your stress. Stress can negatively impact the way you digest foods and absorb nutrients leading to more digestive issues (constipation, diarrhea, indigestion, nausea etc), imbalanced blood sugars and weight imbalances. Try to focus on the following relaxation techniques:     Regular exercise such as walking     Yoga    Meditation     Breathing  techniques     Time management     Work on mindset shifts to help with motivation - check out the daily affirmations abel and motivation abel to provide inspiration and accountability on your phone. The following are two really good applications to add to phone:      1. I am - Daily Affirmations   Manifest motivation reminders by Monkey Taps     2.   Motivation - Daily quotes  Reminders to think positive by Monkey Taps     Look into [email or web address] [employer] wellness journal.   [facility] includes a daily food and exercise journal, weekly and quarterly goal planners, training and clean eating tips, weekly shopping lists, healthy habit reminders & mindset coaching.     If you're looking for a journal to help you focus on holistic health, wellness, and vitality, this wellness journal is for you.    Track what you eat. Writing down or tracking through an abel what you eat as well as how you feel acn help you identify patterns in your symptoms. This can help you become more aware and creat a diet that is right for you.     Pick a food tracking abel:     Through the mysymptoms abel, you can track symptoms, bowl movements, medications, stress, exercise, sleep and foods as well as beverages to become more mindful. [email or web address]      Through the myfitnesspal abel, you can focus more on calories and macronutrient's of foods to balance blood sugars or monitor weight. You can track blood sugars in the notes section along with symptoms, bowl movements, medications, stress, exercise, water intake and sleep.   Note: You don't have to journal forever and it is more important that you have consistent meals as well as snacks while focusing on adding in healthy foods.    NUTRITION RESOURCES:  1. Purchase Eat Fat Get Thin by Manolo Rosenberg Book/Cookbook   2. IFM Cardiometabolic Packet     Functional Nutrition Fundamentals     Comprehensive Guide    Meal plan with recipes             PATIENT'S BEHAVIOR CHANGE GOALS:    See nutrition intervention for patient stated behavior change goals. AVS was printed and given to patient at today's appointment.    MONITOR / EVALUATE:  Registered Dietitian will monitor/evaluate the following:     Beliefs and attitudes related to food    Food and nutrition knowledge / skills    Food / Beverage / Nutrient intake     Pertinent Labs    Progress toward meeting stated nutrition-related goals    Readiness to change nutrition-related behaviors    Weight change    Digestion     COORDINATION OF CARE:  Follow up with primary care provider       FOLLOW-UP:  Follow-up appointment scheduled on Dec 28th at 11am video visit    Time spent in minutes: 60 minutes 4 units   Encounter: Individual    Rand Regalado RD, CLT, LD  Integrative Registered Dietitian       Again, thank you for allowing me to participate in the care of your patient.        Sincerely,        Rand Regalado RD

## 2022-11-08 NOTE — LETTER
11/8/2022         RE: Barbara Yates  [address]  [facility] [address]        Dear Nicole,    Thank you for referring your patient, Barbara Yates, to the nutrition services at [facility]. I enjoyed meeting with Barbara and helping her create nutrition plan with recipes to meet her weight loss goals. Please see a copy of my visit note below for more details. I look forward to following up with her in December to monitor progress.     Thanks,   Rand       Medical Nutrition Therapy  Visit Type: Initial assessment and intervention    Visit Details    How would you like to obtain your AVS? MyChart  If the correspondence for visit is dropped, how would you like your dietitian to reconnect with you:   call back by phone? Yes    Text to cell phone: [phone]  Will anyone else be joining your video visit or telephone call? No  {If patient encounters technical issues they should call [phone]    Type of service:  Video Visit     Start Time: 2:35 PM    End Time:3:36 PM    Originating Location (pt. Location): Home    Distant Location (provider location):  [facility] WORKING VIRTUAL FROM HOME     Platform used for Video Visit: Macie      Referring Provider: Nicole Soto  [facility]     REASON FOR REFERRAL:   Barbara Yates is a 42 year old female who is interested in Medical Nutrition Therapy (MNT) and education related to weight management.   She is accompanied by self.     How to incorporate more protein. She is trying to get more nutrients from the food that she eats instead of having to just take supplements. She tried taurus and more keto based. She tried the meal replacements. She got the keto flu and don't having naturals sugars and more artificial sugars didn't fit well. Her background is  and its harder to go lower carb and cant cut certain things out of her diet. She doesn't know how to read labels. Just  doesn't know and needs to find the balance. Less likely to fall of the strict and ridged plan. Worry about the holidays coming up with baking this and that. Needs to look into the moderation and what exactly and understand nutrition better. Does she need to be concerned about macros vs calories and all these different things ways of foods, how to read them and what is better for weight loss. Toss out all the things you can't have an diets start focusing on what you can have. No subscription, strict foods you have to use. More flexibility an recipes to try out and tweak to her own liking. Got sick after cutting everything cold turkey. She shocked her system and got shocked. Quitting the every day foods used to and going to something else didn't work. Being more aware of foods now and pay attention to the label and macro or carb counts ex for oat milk.     You know for her I feel like getting down to 120lbs would be easier for her. She isn't sure of her current weight but constantly having low energy and never used to have that before. She has a lot of joint problems. Her stamina just isn't the same. She just wants to exercise and eat healthier and see where it takes her. Having a more healthy lifestyle. She is borderline diabetic.     Has coffee for breakfast skipping food usually or goes out every other day to so something quick and easy will do caribou mocha with oatmilk or milk cinnamon     First meal around lunch hour 11am - usually has salad with a protein in the office and if crunched on time has ramen noodles with egg if she gets a chance to add.     Sometimes does carrots with dressing as snack    Dinner depends she will get a salad or soup from chick faley or sweets or fried egg with rice or stir ramírez with rice or chili just depends on the eveing more often home cooked meals than running out and getting something      NUTRITION ASSESSMENT:   No flowsheet data found.     Neurological 11/8/2022   Tension  Headache Current       No flowsheet data found.   No flowsheet data found.   No flowsheet data found.    No flowsheet data found.   No flowsheet data found.   Skin 11/8/2022   Acne Current   Dry Skin Current      No flowsheet data found.   No flowsheet data found.   Psychological 11/8/2022   Depression/Anxiety Past;Current      Genitourinary 11/8/2022   PMS Past;Current      Womens Health Assessment 11/8/2022   Hysterectomy No   I am pregnant.  No   I am breastfeeding. No   I want to become pregnant within the next year . No   What do you use for contraception?  IUD   Any problems with current birth control method?   No   Date of last menstrual period 24629   Are your periods irregular? No   Days from the start of one period to the next. 45   Days of Menstral Flow 1   Associated with menstral periods. Headaches;Mood swings;Bloating;Breast tenderness   Are you officially in menopause? (no period for one year or longer)  No        Past Medical History:  Past Medical History:   Diagnosis Date     Anxiety      Attention deficit disorder with hyperactivity(314.01)      Cervical high risk HPV (human papillomavirus) test positive     11/28/05, 6/14/06, 9/20/21     Depression      Depressive disorder Unknown     H/O colposcopy with cervical biopsy 06/07/2005    ECC- within normal limits. no dysplasia     H/O colposcopy with cervical biopsy 07/13/2006    biopsies and ECC- within normal limits. no dysplasia noted.     Infectious mononucleosis 01/01/2004     LSIL (low grade squamous intraepithelial lesion) on Pap smear 05/06/2005 9/20/21       Previous Surgeries:   Past Surgical History:   Procedure Laterality Date     ARTHROSCOPY KNEE RT/LT Left 09/08/2016     AS CLOSED TX NASAL BONE FRACTURE W/ STABILIZATION  03/01/2016     EXAM OF VAGINA,COLPOSCOPY  2006, 2005     EYE SURGERY  01/01/2008    Lasik eye surgery     ORTHOPEDIC SURGERY  9/8/16 and 12/12/16    knee/tibia        Family History:  Family History   Problem  Relation Age of Onset     Hypertension Mother      Diabetes Mother      Unknown/Adopted Father      Diabetes Father      Family History Negative Other         Lifestyle History:  Lifestyle 11/8/2022   Do you feel your life is stressful right now?  No   Are you currently implementing any strategies to help manage stress? Yes   What are you doing to manage stress?  Meditation;Breathing exercises;Affirmations;Aromatherapy;Counseling;Journaling;Indulge in food;Listening to music;Sleep;Gratitude        Exercise History:  Exercise 11/8/2022   Does your occupation require extended periods of sitting?  Yes   Does your occupation require extended periods of repetitive movements (ex: walking or lifting)?  Yes   Do you currently participate in any forms of exercise? No   Rate your level of motivation for including exercise in your routine (0=none, 10=high): 8   Do you have any medical conditions, pain, injuries, surgeries etc. restricting you from exercise? No        Sleep History:  Sleep 11/8/2022   How many hours (on average) do you sleep per night? 7-9   What time do you turn off the lights? 10 PM   How long does it take for you to fall asleep? 25-30 mins   What time do you stop using electronic devices? 11 PM   What time do you wake up? 6 AM   When do you eat your first meal?  9 AM   Do you feel well-rested during the day?  No   Do you take naps?  Yes   Do you have a comfortable bedroom environment (cool, quiet, dark, etc)? Yes   Do you have a sleep routine/ ritual that you do before bed?  Yes   How many hours do you spend per day looking at a screen (TV, computer, tablet and phone)? 10 +   Select all factors that apply to your current sleep habits: Difficulty falling asleep;Have difficulty waking up in the morning;Not hungry in the morning;Take medication for sleep on most night of the week;Snore loudly or have been told you stop breathing;Night sweats;Drink coffee, soda or energy drinks after noon;Have tried supplements  (ie: melatonin) for sleep;Grinding teeth        Nutrition History:  Nutrition 11/8/2022   Have you ever had a nutrition consultation? Yes   Do you currently follow a special diet or nutritional program? No   What do you feel are the biggest barriers getting in the way of achieving you nutritional goals? Motivation/Readiness to change;Financial concerns;Lack of social support;Lack of time;Lack of prep/cooking skills;Work (such as lack of time to eat, unhealthy choices, etc.);Lack of nutrition knowledge;TV/computer/social media   Do you have any food allergies, sensitivities or intolerances?  No       Digestion 11/8/2022   Do you experience stomach pains/cramping? Monthly   Do you experience bloating?  Weekly   Do you experience gas?  Daily   Do you experience heartburn/acid reflux/indigestion? Rarely   How often do you have a bowel movement? Every other day   What is a typical bowel movement like for you? Select all that apply: Varies a lot      Food Access:  11/8/2022   Who does the grocery shopping? Self;Mother;Spouse/Partner;Family member   How often is grocery shopping done? 2 times per week   Where do you usually receive your groceries from? Select all that apply: Target;[employer]   Do you read food labels? No   Who does the cooking? Select all that apply: Self;Mother;Spouse/Partner;Family Member   How many meals do you eat out per week?  3 to 5   What restaurants do you typically choose? Fast Food (Taco Bell, Shell's, Subway, etc.)      Daily Patterns: 11/8/2022   How many days per week do you have breakfast? 1   How many days per week do you have lunch? 6   How many days per week do you have dinner? 7   How many days per week do you have snacks? 5      Protein Intake: 11/8/2022   How many times per day do you typically consume a protein source(s)? 2   What types of protein do you currently eat?  Ground Beef;Beef Ribs;Steak;Hamburgers;Beef Roast;Pork Chops;Pork  Ribs;Mckeon/[place] Mckeon;Deli Ham;Sausage;Other Pork;[place];Tuna;Shrimp;Lobster;Other Fish;Chicken Breast;Chicken Thighs/Legs;Ground Chicken;Turkey Breast;Deli Turkey;Eggs;Tofu       Fat Intake:  11/8/2022   How many times per day do you typically consume healthy fat(s)? 1   What types of health fats do you currently eat? Select all that apply:  Almonds;Walnuts;Sunflower seeds;Peanut butter;Velazquez;Margarine;Butter;Vegetable oil;Salad dressings;Avocado oil;Olive oil;Avocado       Fruit Intake:  11/8/2022   How many times per day do you typically consume fruits? 1   What types of fruit do currently eat? Apple;Banana;Blackberries;Blueberries;Cantaloupe;Cherries;Grapes;Grapefruit;Honeydew;Duran;Melon;Nectarine;Pears;Peaches;Pineapple;Plums;Raspberries;Tangerines;Watermelon       Vegetable Intake:  11/8/2022   How many times per day do you typically consume vegetables? 1   What types of vegetables do you currently eat? Artichoke;Asparagus;Beans;Broccoli;Cabbage;Carrots;Cauliflower;Celery;Corn;Cucumber;Green onions/scallions;Greens (jluis, kale etc);Peas;Potato (baked, boiled, mashed, French fries);Radish;Soy beans;Spinach;Squash (summer, zucchini);Sugar snap peas;Tomato;Yam, sweet potato      Grain Intake:  11/8/2022   How many times per day do you typically consume grains? 3   What types of grains do currently eat? Select all that apply:  Bagel (non-gluten free);Breads (non-gluten free);Cereals (non-gluten free);Chips (non-gluten free);Crackers (non-gluten free);Muffins (non-gluten free);Pancakes/waffles (non-gluten free);Pasta (non-gluten free);Pretzels (non-gluten free);Other (non-gluten free)       Dairy Intake:  11/8/2022   How many times per day do you typically consume dairy? 2   What types of dairy do currently eat? Select all that apply:  Milk;Cheese;Yogurt;Ice cream       Non-Dairy Alternative Intake:  11/8/2022   How many times per day do you typically consume non-dairy alternatives? 1   What types of  non-dairy alternatives do currently eat? Select all that apply:  Oat milk       Sweets Intake:  11/8/2022   How many times per day do you typically consume sweets? 3      Beverage Intake:  11/8/2022   How many 8 oz cups of water do you typically consume per day?  4 to 5   How many 8 oz cups of caffeine do you typically consume per day?  3 to 4   How many drinks of alcohol do you typically consume per week (1 drink = 5 oz wine, 12 oz beer, 1.5 oz spirits)?   1 to 3       Lifestyle Recall:  11/8/2022   What time did you wake up? 7 AM   What time did you go to sleep? 12 AM   What time did you have breakfast? 9-10 AM   Where did you have breakfast?  Work   What time did you have a morning snack? No snack   What time did you have lunch? 11AM-12 PM   Where did you have lunch?  Work   What time did you have an afternoon snack? No snack   What time did you have dinner? 6-7 PM   Where did you have dinner?  Home   What time did you have an evening snack? No Snack   What time of day did you exercise? 6 PM   Where did you exercise? Gym/Fitness Center          Additional concerns:    MEDICATIONS:  Current Outpatient Medications   Medication Sig Dispense Refill     albuterol (PROAIR HFA/PROVENTIL HFA/VENTOLIN HFA) 108 (90 Base) MCG/ACT inhaler USE 2 INHALATIONS EVERY 6 HOURS MCG/ACT 17 g 1     busPIRone (BUSPAR) 15 MG tablet Take 1 tablet (15 mg) by mouth 2 times daily 180 tablet 0     fexofenadine (ALLEGRA) 180 MG tablet Take 180 mg by mouth daily       levonorgestrel (KYLEENA) 19.5 MG IUD 1 each (19.5 mg) by Intrauterine route once for 1 dose 1 each 0     mometasone-formoterol (DULERA) 200-5 MCG/ACT inhaler USE 2 INHALATIONS TWICE A DAY Strength: 200-5 MCG/ACT 39 g 0     montelukast (SINGULAIR) 10 MG tablet Take 1 tablet (10 mg) by mouth At Bedtime 90 tablet 2     traZODone (DESYREL) 50 MG tablet TAKE 1 TABLET AT BEDTIME AS NEEDED (NEED  TO BE SEEN FOR FURTHER REFILLS) 30 tablet 11     triamcinolone (KENALOG) 0.1 % external  ointment Apply topically 2 times daily (Patient not taking: Reported on 10/13/2022) 30 g 0     venlafaxine (EFFEXOR XR) 150 MG 24 hr capsule Take 1 capsule (150 mg) by mouth daily 90 capsule 1       Dietary Supplements 11/8/2022   Individual Vitamin Supplements General multivitamin         ALLERGIES:   Allergies   Allergen Reactions     Seasonal Allergies         .na  LABS:  Last Basic Metabolic Panel:  Lab Results   Component Value Date     10/08/2020     05/05/2011     02/08/2011      Lab Results   Component Value Date    POTASSIUM 4.0 10/08/2020    POTASSIUM 3.9 05/05/2011    POTASSIUM 3.5 02/08/2011     Lab Results   Component Value Date    CHLORIDE 107 10/08/2020    CHLORIDE 104 05/05/2011    CHLORIDE 100 02/08/2011     Lab Results   Component Value Date    MARYSE 8.9 10/08/2020    MARYSE 8.6 05/05/2011    MARYSE 8.6 02/08/2011     Lab Results   Component Value Date    CO2 31 10/08/2020    CO2 25 05/05/2011    CO2 22 02/08/2011     Lab Results   Component Value Date    BUN 8 10/08/2020    BUN 7 05/05/2011    BUN 5 02/08/2011     Lab Results   Component Value Date    CR 0.76 10/08/2020    CR 0.48 05/05/2011    CR 0.64 02/08/2011     Lab Results   Component Value Date    GLC 91 10/08/2020    GLC 95 10/18/2019    GLC 94 09/21/2018       Last Glucose Profile:   Hemoglobin A1C   Date Value Ref Range Status   10/13/2022 5.5 0.0 - 5.6 % Final     Comment:     Normal <5.7%   Prediabetes 5.7-6.4%    Diabetes 6.5% or higher     Note: Adopted from ADA consensus guidelines.   09/20/2021 5.6 0.0 - 5.6 % Final     Comment:     Normal <5.7%   Prediabetes 5.7-6.4%    Diabetes 6.5% or higher     Note: Adopted from ADA consensus guidelines.   10/08/2020 5.2 0 - 5.6 % Final     Comment:     Normal <5.7% Prediabetes 5.7-6.4%  Diabetes 6.5% or higher - adopted from ADA   consensus guidelines.         Last Lipid Profile:   Cholesterol   Date Value Ref Range Status   10/13/2022 155 <200 mg/dL Final   09/20/2021 171 <200  mg/dL Final   10/08/2020 171 <200 mg/dL Final   10/18/2019 147 <200 mg/dL Final   09/21/2018 147 <200 mg/dL Final     HDL Cholesterol   Date Value Ref Range Status   10/08/2020 49 (L) >49 mg/dL Final   10/18/2019 53 >49 mg/dL Final   09/21/2018 53 >49 mg/dL Final     Direct Measure HDL   Date Value Ref Range Status   10/13/2022 54 >=50 mg/dL Final   09/20/2021 51 >=50 mg/dL Final     LDL Cholesterol Calculated   Date Value Ref Range Status   10/13/2022 90 <=100 mg/dL Final   09/20/2021 101 (H) <=100 mg/dL Final   10/08/2020 99 <100 mg/dL Final     Comment:     Desirable:       <100 mg/dl   10/18/2019 76 <100 mg/dL Final     Comment:     Desirable:       <100 mg/dl   09/21/2018 81 <100 mg/dL Final     Comment:     Desirable:       <100 mg/dl     Triglycerides   Date Value Ref Range Status   10/13/2022 55 <150 mg/dL Final   09/20/2021 97 <150 mg/dL Final   10/08/2020 113 <150 mg/dL Final   10/18/2019 90 <150 mg/dL Final   09/21/2018 63 <150 mg/dL Final     Comment:     Fasting specimen     Cholesterol/HDL Ratio   Date Value Ref Range Status   06/18/2015 2.6 0.0 - 5.0 Final   06/17/2009 2.4 0.0 - 5.0 Final       Most recent CBC:  Recent Labs   Lab Test 10/13/22  1205 09/20/21  1316 10/08/20  0901   WBC 6.7 8.0 6.5   HGB 12.0 12.1 12.0   HCT 36.1 36.5 37.4    270 281     Most recent hepatic panel:  No lab results found.    Invalid input(s): TAM, ALP  Most recent creatinine:  Recent Labs   Lab Test 10/08/20  0901   CR 0.76       No components found for: GFRESETIMATEDLASTLAB(gfrestblack:1@  Lab Results   Component Value Date    ALBUMIN 4.5 06/17/2009       Last Thyroid Profile:   TSH   Date Value Ref Range Status   10/13/2022 1.01 0.40 - 4.00 mU/L Final   09/20/2021 1.50 0.40 - 4.00 mU/L Final   10/08/2020 1.27 0.40 - 4.00 mU/L Final   10/18/2019 1.26 0.40 - 4.00 mU/L Final   09/21/2018 1.43 0.40 - 4.00 mU/L Final       Last Mineral Profile:   Ferritin   Date Value Ref Range Status   10/08/2020 38 12 - 150 ng/mL  "Final     Iron   Date Value Ref Range Status   10/08/2020 85 35 - 180 ug/dL Final     Iron Binding Cap   Date Value Ref Range Status   10/08/2020 294 240 - 430 ug/dL Final       Autoimmune & Inflammatory   No results found for: CRP      Last Vitamin Profile:   No results found for: ZRD418, BWJD524, QGSV98YEIAR, VITD3, D2VIT, D3VIT, DTOT, [id number], [id number], [id number], [id number], [id number], [id number]    ANTHROPOMETRICS:  Vitals:   BP Readings from Last 1 Encounters:   10/13/22 116/78     Pulse Readings from Last 1 Encounters:   10/13/22 77     Estimated body mass index is 25.25 kg/m  as calculated from the following:    Height as of 10/13/22: 1.595 m (5' 2.8\").    Weight as of 10/13/22: 64.2 kg (141 lb 9.6 oz).    Wt Readings from Last 5 Encounters:   10/13/22 64.2 kg (141 lb 9.6 oz)   07/30/22 61.9 kg (136 lb 6 oz)   03/21/22 61.4 kg (135 lb 7 oz)   12/09/21 62.1 kg (137 lb)   10/28/21 62.8 kg (138 lb 6.4 oz)       NUTRITION DIAGNOSIS:   1. Altered nutrition-related laboratory values related to endocrine dysfunction as evidenced by elevated BMI, HbA1c     2. Altered nutrition-related laboratory values related to cardiac as evidenced by low HDL.          NUTRITION INTERVENTION:         Long Term Goals:   Goal: Lipid profile; Increase HDL > 49mg/dL within 6 months   Goal: Increase healthy relationship with food   Goal: Lose 20-24lbs in 6 months, recommend average 1-2lbs per week of weight loss.           Short Term Goals:  Goal 1: Focus on smoothie for breakfast, ---add in unsweetened flax, hemp or coconut milk, 1 tbsp of healthy fat such as flax seed ground or juanita seed ground. serving of fruit 1 cup of berries. Veggie (optional), 1 scoop of plant based protein powder or collagen powder, 1 serving of fruit and veggie such as kale, spinach, or veggie powder even spirulina - see recipes below or details)      Also, check out some of the other breakfast ideas provided such as oatmeal (naturally gluten free) with " dairy free alternative milk, flax seed, nuts such as walnuts, fruit such as berries and protein like eggs or chicken/turkey sausage.      Goal 2:  Make sure to eat consistent meals daily with focus on balanced lunch and at least 1-2 snacks daily around 10am and 3pm.  Eating every three to four hours will help keep your insulin and blood sugar normal to help with weight loss  - see meal plan and recipe ideas in the cardi metabolic guides provided.       2 hard boiled eggs.     Greek Yogurt with Blackberries    Fresh Yellow Pear or veggie (radishes, bell peppers, baby cucumbers, carrots or sugar snap peas) with Hummus - try the mini to go packs to help with portion control     Marinated Olives* and Kefir     Purple Plum with Mixed Nuts    Celery with [place] Butter    Dark Chocolate, 70% or higher Cocoa and Pistachio Nuts    Banana with almond butter or sunflower seed butter     Apple with peanut butter topped with cinnamon     Baby cucumber or carrots, bell pepper, radishes or sugar snap peas with individual servings of guac     Low sodium cottage with peaches or mandarin oranges     Sunflower seeds with strawberries     Turkey jerky or 100% grass fed beef jerky     Plant based protein bar     Simple Mills [place] Flour Crackers or Shirlene Gone Cracker dip with hummus or guac     Avocado on slice of gluten free bread recommend base culture brand or 100% whole grain bread    Goal 4: Monitor portion sizes   - consider just having 1 serving of grains per day and swap for lower carb/sugar options such as non-starchy veggies. Read labels for low sugar and carbs.    - consider having alcohol in moderation and or avoiding. Alcohol can increase triglycerides and fat in the liver and create blood sugar imbalances leading to weight gain.      Goal 5: Recommend the following supplements to start     One Multivitamin by Pure encapsulations - 1 capsule per day with food     Nordic Naturals Plus vitamin D - 1-2 capsules daily with  food ----[email or web address]/ultimate-omega-[email or web address]     Look on amazon to find the supplement recommendations or talk with the [facility] to special order. They may be able to offer a discount as well.        Smoothie Recipes to Kickoff Your Success!!     Pineapple Coconut Smoothie      Makes 1-2 serving     1-2 cup unsweetened coconut milk   1 scoop mckeon vanilla plant based protein powder or collagen protein powder by vital proteins (great for just having no flavor - plain)   1 cup frozen pineapple chunks   1 handful spinach (optional)     banana (optional - if added use less pineapple so not as high of sugar content)   1 tbsp shredded unsweetend coconut to top (optional)  1 tbsp ground flax seed (optional)     Method:     In  (recommend vitamix or blend tech) add dairy free alternative milk or filtered water. Add in the rest of ingredients blend on high for 2 mins. If desire thinner consistency add in the water or dairy alternative. Enjoy :)    Kiwi Spirulina Smoothie      Makes 1 servings     1 cup unsweetened macadamia nut milk or filtered water   1 scoop mckeon vanilla plant based protein powder or 1-2 scoops collagen powder from vital proteins (plain)   1-2 Kiwi s peeled   1 handful cilantro   1 lime juiced    - 1 avocado   2 tsp spirulina   1 inch eduard fresh pilled      Method:     In  (recommend vitamix or blend tech) add dairy free alternative milk or filtered water. Add in the rest of ingredients blend on high for 2 mins. If desire thinner consistency add in the water or dairy alternative. Enjoy :)     Green Detox Smoothie      Makes 1 servings     1 cup unsweetened macadamia nut milk or filtered water (I like filtered water more for this recipe)  1-2 scoops collagen powder from vital proteins (plain)   1 small granny del castillo apple (prefer organic - lower pesticides and endocrine disruptors)   1 handful cilantro   1 lime juiced    - 1 avocado (optional)   1  cucumber      Method:     In  (recommend vitamix or blend tech) add dairy free alternative milk or filtered water. Add in the rest of ingredients blend on high for 2 mins. If desire thinner consistency add in the water or dairy alternative. Enjoy :)        Spirulina Banana Smoothie      Makes 1 serving     1-2 cup unsweetened macadamia nut milk or filtered water (I like filtered water more for this recipe)  1 scoop mckeon vanilla plant based protein powder     banana   1 handful spinach    - 1 avocado  1-2 tsp spirulina    1 tbsp ground flax seed      Method:     In  (recommend vitamix or blend tech) add dairy free alternative milk or filtered water. Add in the rest of ingredients blend on high for 2 mins. If desire thinner consistency add in the water or dairy alternative. Enjoy :)     Berry Blast Smoothie      Makes 1 serving     1-2 cup unsweetened macadamia nut milk or filtered water (I like filtered water more for this recipe)  1 scoop mckeon vanilla plant based protein powder or collagen protein powder by vital proteins (plain)   1 cup mixed berries    1 handful spinach    - 1 avocado  1-2 tsp raw organic maqui berry powder by Sunfood superfoods (optional but very immune boosting and jammed with phytonutrients)   1 tbsp ground flax seed      Method:     In  (recommend vitamix or blend tech) add dairy free alternative milk or filtered water. Add in the rest of ingredients blend on high for 2 mins. If desire thinner consistency add in the water or dairy alternative. Enjoy :)     Superfood + Immune Boosting Powders:  Maqui Berry Powder   Turmeric Powder   Spirulina Powder   Glutamine Powder   Matcha Powder   Sammie fresh or powdered   Kale, dandelion greens or spinach         Omega 3 and Protein Desired Toppings:   Add some healthy protein and omega 3 packed seeds for an extra special nutrient packed topping and a little extra crunch!   1 tsp -1 tbps ground flax seed   1 tsp -1 tbsp hemp seeds    1 tsp-1 tbsp juanita seeds      Mediterranean Approach: Eat whole, unprocessed real foods in their unprocessed forms such as fruits, vegetables, whole grains (prefer gluten free), nuts, legumes, extra virgin olive oil, spices, modest amounts of poultry, and fish.      Avoid inflammatory foods: Eliminate gluten found in wheat, barley, rye, oats, kamut, and spelt for at least 3 weeks to identify any hidden reaction. Gluten sensitivity or allergy can cause many different types of symptoms form migraines to fatigue to weight gain.  If symptoms go away this is a clue you may be reactive to gluten.  Dairy can also be inflammatory consider eliminating for 3 weeks as well. The proteins like casein and whey in dairy can irritate and inflame your gut. Also the sugar lactase in dairy can cause digestive issues in addition to blood sugar spikes.     Cardiometabolic Food plan - 1,800-2,200 calories per day     Protein 10-12 servings per day - include at each meal to stabilize blood sugars   (Choose 3oz or 21g per meal and aim for 1oz of 7g for snacks)   - Strive for 1-2 servings of fish per week especially of higher omega-3 fatty acid containing fish such as salmon.     Legumes <2 serving per day     Dairy alternatives 2-3 serving per day     Nuts and seeds 3-4 servings per day - great to incorporate as snacks    Fats and oils 4 servings per day     Non starchy vegetables 8-10 servings per day     Starchy vegetable limit 1 serving per day as they tend to impact blood sugar (they are moderate-GI).     Fruits 2 servings per day - best to couple with a little bit of protein or fat to offset a rise in blood sugars (they are low-moderate-GI foods).     Whole grains <2 serving per day - try gluten free whole grains instead        Incorporate protein powder daily:    Plant based hemp (recommended brands: [place], Nutiva, Just Hemp Protein, "[employer] [place])      Plant based pea (recommended brands: Naked Pea, Now Sports). If you  want to try a combo of pea and hemp the brand Poe in vanilla or chocolate is a great option.     Try Bone broth protein powder or collagen peptides in liquid bone broth, vegetable broth or 12 oz of water as snack. The bone broth powder and collagen can be used for soups as well. This can help provide essential amino acids and minerals that heal your gut as well as balance blood sugars. A great option if you have a hard time tolerating solids.     Can also consider pre made shakes if unable to make smoothies.      Brand examples that are gluten and dairy free to try:   1) Orgain Vegan Protein Shakes, 20g of Plant Based Protein, Creamy Chocolate - Gluten Free, No Dairy, Soy, or Preservatives, No Added Sugar  2) Pirq, Vegan Protein Shake, Turmeric Curcumin, Huma, Plant-Based Protein Drink, Gluten-Free, Dairy-Free, Soy-Free, Non-GMO, Vegetarian, Kosher, Keto, Low Carb, Low Calorie  3) OWYN Pro Elite Vegan Plant-Based High Protein Shake, 35g Protein, 9 Amino Acids, Omega-3, Prebiotics, Superfoods Greens for Workout and Recovery, 0g Net Carbs, Zero Sugar, Keto  4) Ripple Vegan Protein Shake  Chocolate  20g Nutritious Plant Based Pea Protein  Shelf Stable  No GMOs, Soy, Nut, Gluten, Lactose  5) [employer] International Glucose Support 1.2 Plant based, dairy free, low glycemic index  [email or web address][email or web address]/glucose-support-[email or web address]-vanilla    Choose Low Glycemic (GI) foods: Regulate your sugar levels by eating foods that do not spike blood sugars.  Eat low -GI foods so only small fluctuations in blood glucose and insulin levels are produced.      Examples of low-GI foods: nuts, seeds, GF oats, most vegetables especially non-starchy and fruits.     Medium or high-GI foods should be eaten with a protein or fat, both of which blunt the glycemic effect of these foods. This reduces the overall glycemic impact of a meal.   Ex: Most grains and starchy veggies are medium/high GI.     Avoid foods containing refined sugars, artificial  sweeteners, and refined grains they are considered high-GI because they lead to sharp increases in blood sugars levels, which increase insulin sensitivity causing increased TG, and low good cholesterol (HDL).   Ex: cakes, cookies, pies, bread, sodas, fruit drinks, presweetened tea, coffee drinks, energy or sport drinks, flavored milk and other processed foods.     Choose foods high in fiber: Aim for at least 5 grams of fiber per serving of food or a total of 25-35 grams fiber per day. Remember, when looking at the label, you can take the fiber away from the total carbs. Ex:15g of total carbs - 4g of fiber = 11g net carbs     Insoluble fiber acts like a bulky  inner broom,  sweeping out debris from the intestine and creating more motility and movement.      Soluble fiber attracts water and swells, creating a gel that slows digestion.  Also, slows the release of glucose from foods into the blood which stops spikes in blood sugar levels.  Soluble fiber traps toxins in the gut, helping to carry them to excretion and provides healthy bacteria in the digestive tract.     Choose High Quality Fats: Adding anti-inflammatory fats into your diet such as fish (salmon, herring, mackerel, and sardines), omega 3 eggs, juanita seeds, ground flax seeds/milk, hemp seeds/milk, walnuts and some other certain leafy greens will increase omega-3 fats to omeaga-6 fats ratio.     Therapeutic fats both monounsaturated and polyunsaturated to include daily: ground flaxseeds, unsalted mixed nuts, avocados, olives, extra-virgin olive oil.     Emphasize high-quality oils and fats in the diet daily such as avocado oil, coconut oil, flaxseed, olive, sesame. Ex: 1 tsp to 1 tbsp of MCT oil from coconuts can be added into coffee, smoothies, and salad dressings per day.     Avoid trans fats found in processed foods     Drink more water. Hydration is critical, so drink at least six to eight glasses of water a day. Drink more water between meals and less at  meals.     Try adding herbal teas (sugar free) or lemon/lime/cucumber/fruit to water for flavor. Avoid artificial sweetener packets to flavor your water.     Cut back on coffee switch to green tea. Avoid adding sugar and milk to coffee instead use dairy alternatives such as almond, flax, coconut milk.     Try another coffee substitute such as   -[email or web address]  -[email or web address]    Focus on high quality micro-nutrients.     One Multivitamin by Pure encapsulations - 1 capsule per day with food     Nordic Naturals Plus vitamin D - 1-2 capsules daily with food    Rebuild the friendly bacteria in your microbime. Start by taking a probiotic supplement, they will help rebuild the healthy bacteria so essential to good gut health.     Recommend one of the following high quality probiotics:   1. BIOHM health (30 billion cfus per capsule) from [employer] to help break down digestive plaque and get in amylase and variety of good bacteria.  Does not need to be refrigerated great for traveling. Take 1 capsule daily anytime with our without food   2. VISBIOME (112.5 billion bacteria per capsule needs to be refrigerated).  Take 1 capsule daily anytime with our without food.     In addition you can help establish healthy gut microflora by consuming foods that contain live active cultures ( probiotics ) such as kimchi,     Increase physical activity. Moving our body helps move our bowels and speeds up your metabolism.     Exercise 15-60 minutes daily--whether that looks like burst training, yoga, or vigorous walking.    Manage your stress. Stress can negatively impact the way you digest foods and absorb nutrients leading to more digestive issues (constipation, diarrhea, indigestion, nausea etc), imbalanced blood sugars and weight imbalances. Try to focus on the following relaxation techniques:     Regular exercise such as walking     Yoga    Meditation     Breathing techniques     Time management     Work on  mindset shifts to help with motivation - check out the daily affirmations abel and motivation abel to provide inspiration and accountability on your phone. The following are two really good applications to add to phone:      1. I am - Daily Affirmations   Manifest motivation reminders by Monkey Taps     2.   Motivation - Daily quotes  Reminders to think positive by Monkey Taps     Look into [email or web address] Wellkeeper wellness journal.   Dailygreatness Wellness includes a daily food and exercise journal, weekly and quarterly goal planners, training and clean eating tips, weekly shopping lists, healthy habit reminders & mindset coaching.     If you're looking for a journal to help you focus on holistic health, wellness, and vitality, this wellness journal is for you.    Track what you eat. Writing down or tracking through an abel what you eat as well as how you feel acn help you identify patterns in your symptoms. This can help you become more aware and creat a diet that is right for you.     Pick a food tracking abel:     Through the mysymptoms abel, you can track symptoms, bowl movements, medications, stress, exercise, sleep and foods as well as beverages to become more mindful. [email or web address]      Through the myfitnesspal abel, you can focus more on calories and macronutrient's of foods to balance blood sugars or monitor weight. You can track blood sugars in the notes section along with symptoms, bowl movements, medications, stress, exercise, water intake and sleep.   Note: You don't have to journal forever and it is more important that you have consistent meals as well as snacks while focusing on adding in healthy foods.    NUTRITION RESOURCES:  1. Purchase Eat Fat Get Thin by Manolo Rosenberg Book/Cookbook   2. IFM Cardiometabolic Packet     Functional Nutrition Fundamentals     Comprehensive Guide    Meal plan with recipes             PATIENT'S BEHAVIOR CHANGE GOALS:   See nutrition intervention for patient  stated behavior change goals. AVS was printed and given to patient at today's appointment.    MONITOR / EVALUATE:  Registered Dietitian will monitor/evaluate the following:     Beliefs and attitudes related to food    Food and nutrition knowledge / skills    Food / Beverage / Nutrient intake     Pertinent Labs    Progress toward meeting stated nutrition-related goals    Readiness to change nutrition-related behaviors    Weight change    Digestion     COORDINATION OF CARE:  Follow up with primary care provider       FOLLOW-UP:  Follow-up appointment scheduled on Dec 28th at 11am video visit    Time spent in minutes: 60 minutes 4 units   Encounter: Individual    Rand Regalado RD, CLT, LD  Integrative Registered Dietitian       Again, thank you for allowing me to participate in the care of your patient.        Sincerely,        Rand Regalado RD

## 2022-11-08 NOTE — PROGRESS NOTES
Medical Nutrition Therapy  Visit Type: Initial assessment and intervention    Visit Details    How would you like to obtain your AVS? MyChart  If the correspondence for visit is dropped, how would you like your dietitian to reconnect with you:   call back by phone? Yes    Text to cell phone: [phone]  Will anyone else be joining your video visit or telephone call? No  {If patient encounters technical issues they should call [phone] :12    Type of service:  Video Visit     Start Time: 2:35 PM    End Time:3:36 PM    Originating Location (pt. Location): Home    Distant Location (provider location):  [employer] [employer] [facility] FROM HOME     Platform used for Video Visit: Macie      Referring Provider: Nicole Soto  [facility]     REASON FOR REFERRAL:   Barbara Yates is a 42 year old female who is interested in Medical Nutrition Therapy (MNT) and education related to weight management.   She is accompanied by self.     How to incorporate more protein. She is trying to get more nutrients from the food that she eats instead of having to just take supplements. She tried taurus and more keto based. She tried the meal replacements. She got the keto flu and don't having naturals sugars and more artificial sugars didn't fit well. Her background is  and its harder to go lower carb and cant cut certain things out of her diet. She doesn't know how to read labels. Just doesn't know and needs to find the balance. Less likely to fall of the strict and ridged plan. Worry about the holidays coming up with baking this and that. Needs to look into the moderation and what exactly and understand nutrition better. Does she need to be concerned about macros vs calories and all these different things ways of foods, how to read them and what is better for weight loss. Toss out all the things you can't have an diets start focusing on what you can have. No subscription, strict  foods you have to use. More flexibility an recipes to try out and tweak to her own liking. Got sick after cutting everything cold turkey. She shocked her system and got shocked. Quitting the every day foods used to and going to something else didn't work. Being more aware of foods now and pay attention to the label and macro or carb counts ex for oat milk.     You know for her I feel like getting down to 120lbs would be easier for her. She isn't sure of her current weight but constantly having low energy and never used to have that before. She has a lot of joint problems. Her stamina just isn't the same. She just wants to exercise and eat healthier and see where it takes her. Having a more healthy lifestyle. She is borderline diabetic.     Has coffee for breakfast skipping food usually or goes out every other day to so something quick and easy will do caribou mocha with oatmilk or milk cinnamon     First meal around lunch hour 11am - usually has salad with a protein in the office and if crunched on time has ramen noodles with egg if she gets a chance to add.     Sometimes does carrots with dressing as snack    Dinner depends she will get a salad or soup from chick faley or sweets or fried egg with rice or stir ramírez with rice or chili just depends on the eveing more often home cooked meals than running out and getting something      NUTRITION ASSESSMENT:   No flowsheet data found.     Neurological 11/8/2022   Tension Headache Current       No flowsheet data found.   No flowsheet data found.   No flowsheet data found.    No flowsheet data found.   No flowsheet data found.   Skin 11/8/2022   Acne Current   Dry Skin Current      No flowsheet data found.   No flowsheet data found.   Psychological 11/8/2022   Depression/Anxiety Past;Current      Genitourinary 11/8/2022   PMS Past;Current      Womens Health Assessment 11/8/2022   Hysterectomy No   I am pregnant.  No   I am breastfeeding. No   I want to become pregnant  within the next year . No   What do you use for contraception?  IUD   Any problems with current birth control method?   No   Date of last menstrual period 15957   Are your periods irregular? No   Days from the start of one period to the next. 45   Days of Menstral Flow 1   Associated with menstral periods. Headaches;Mood swings;Bloating;Breast tenderness   Are you officially in menopause? (no period for one year or longer)  No        Past Medical History:  Past Medical History:   Diagnosis Date     Anxiety      Attention deficit disorder with hyperactivity(314.01)      Cervical high risk HPV (human papillomavirus) test positive     11/28/05, 6/14/06, 9/20/21     Depression      Depressive disorder Unknown     H/O colposcopy with cervical biopsy 06/07/2005    ECC- within normal limits. no dysplasia     H/O colposcopy with cervical biopsy 07/13/2006    biopsies and ECC- within normal limits. no dysplasia noted.     Infectious mononucleosis 01/01/2004     LSIL (low grade squamous intraepithelial lesion) on Pap smear 05/06/2005 9/20/21       Previous Surgeries:   Past Surgical History:   Procedure Laterality Date     ARTHROSCOPY KNEE RT/LT Left 09/08/2016     AS CLOSED TX NASAL BONE FRACTURE W/ STABILIZATION  03/01/2016     EXAM OF VAGINA,COLPOSCOPY  2006, 2005     EYE SURGERY  01/01/2008    Lasik eye surgery     ORTHOPEDIC SURGERY  9/8/16 and 12/12/16    knee/tibia        Family History:  Family History   Problem Relation Age of Onset     Hypertension Mother      Diabetes Mother      Unknown/Adopted Father      Diabetes Father      Family History Negative Other         Lifestyle History:  Lifestyle 11/8/2022   Do you feel your life is stressful right now?  No   Are you currently implementing any strategies to help manage stress? Yes   What are you doing to manage stress?  Meditation;Breathing exercises;Affirmations;Aromatherapy;Counseling;Journaling;Indulge in food;Listening to music;Sleep;Gratitude        Exercise  History:  Exercise 11/8/2022   Does your occupation require extended periods of sitting?  Yes   Does your occupation require extended periods of repetitive movements (ex: walking or lifting)?  Yes   Do you currently participate in any forms of exercise? No   Rate your level of motivation for including exercise in your routine (0=none, 10=high): 8   Do you have any medical conditions, pain, injuries, surgeries etc. restricting you from exercise? No        Sleep History:  Sleep 11/8/2022   How many hours (on average) do you sleep per night? 7-9   What time do you turn off the lights? 10 PM   How long does it take for you to fall asleep? 25-30 mins   What time do you stop using electronic devices? 11 PM   What time do you wake up? 6 AM   When do you eat your first meal?  9 AM   Do you feel well-rested during the day?  No   Do you take naps?  Yes   Do you have a comfortable bedroom environment (cool, quiet, dark, etc)? Yes   Do you have a sleep routine/ ritual that you do before bed?  Yes   How many hours do you spend per day looking at a screen (TV, computer, tablet and phone)? 10 +   Select all factors that apply to your current sleep habits: Difficulty falling asleep;Have difficulty waking up in the morning;Not hungry in the morning;Take medication for sleep on most night of the week;Snore loudly or have been told you stop breathing;Night sweats;Drink coffee, soda or energy drinks after noon;Have tried supplements (ie: melatonin) for sleep;Grinding teeth        Nutrition History:  Nutrition 11/8/2022   Have you ever had a nutrition consultation? Yes   Do you currently follow a special diet or nutritional program? No   What do you feel are the biggest barriers getting in the way of achieving you nutritional goals? Motivation/Readiness to change;Financial concerns;Lack of social support;Lack of time;Lack of prep/cooking skills;Work (such as lack of time to eat, unhealthy choices, etc.);Lack of nutrition  knowledge;TV/computer/social media   Do you have any food allergies, sensitivities or intolerances?  No       Digestion 11/8/2022   Do you experience stomach pains/cramping? Monthly   Do you experience bloating?  Weekly   Do you experience gas?  Daily   Do you experience heartburn/acid reflux/indigestion? Rarely   How often do you have a bowel movement? Every other day   What is a typical bowel movement like for you? Select all that apply: Varies a lot      Food Access:  11/8/2022   Who does the grocery shopping? Self;Mother;Spouse/Partner;Family member   How often is grocery shopping done? 2 times per week   Where do you usually receive your groceries from? Select all that apply: [employer]   Do you read food labels? No   Who does the cooking? Select all that apply: Self;Mother;Spouse/Partner;Family Member   How many meals do you eat out per week?  3 to 5   What restaurants do you typically choose? Fast Food (Taco Bell, Shell's, Subway, etc.)      Daily Patterns: 11/8/2022   How many days per week do you have breakfast? 1   How many days per week do you have lunch? 6   How many days per week do you have dinner? 7   How many days per week do you have snacks? 5      Protein Intake: 11/8/2022   How many times per day do you typically consume a protein source(s)? 2   What types of protein do you currently eat?  Ground Beef;Beef Ribs;Steak;Hamburgers;Beef Roast;Pork Chops;Pork Ribs;Mckeon/Bhutanese Mckeon;Deli Ham;Sausage;Other Pork;[place];Tuna;Shrimp;Lobster;Other Fish;Chicken Breast;Chicken Thighs/Legs;Ground Chicken;Turkey Breast;Deli Turkey;Eggs;Tofu       Fat Intake:  11/8/2022   How many times per day do you typically consume healthy fat(s)? 1   What types of health fats do you currently eat? Select all that apply:  Almonds;Walnuts;Sunflower seeds;Peanut butter;Velazquez;Margarine;Butter;Vegetable oil;Salad dressings;Avocado oil;Olive oil;Avocado       Fruit Intake:   11/8/2022   How many times per day do you typically consume fruits? 1   What types of fruit do currently eat? Apple;Banana;Blackberries;Blueberries;Cantaloupe;Cherries;Grapes;Grapefruit;Honeydew;Duran;Melon;Nectarine;Pears;Peaches;Pineapple;Plums;Raspberries;Tangerines;Watermelon       Vegetable Intake:  11/8/2022   How many times per day do you typically consume vegetables? 1   What types of vegetables do you currently eat? Artichoke;Asparagus;Beans;Broccoli;Cabbage;Carrots;Cauliflower;Celery;Corn;Cucumber;Green onions/scallions;Greens (jluis, kale etc);Peas;Potato (baked, boiled, mashed, French fries);Radish;Soy beans;Spinach;Squash (summer, zucchini);Sugar snap peas;Tomato;Yam, sweet potato      Grain Intake:  11/8/2022   How many times per day do you typically consume grains? 3   What types of grains do currently eat? Select all that apply:  Bagel (non-gluten free);Breads (non-gluten free);Cereals (non-gluten free);Chips (non-gluten free);Crackers (non-gluten free);Muffins (non-gluten free);Pancakes/waffles (non-gluten free);Pasta (non-gluten free);Pretzels (non-gluten free);Other (non-gluten free)       Dairy Intake:  11/8/2022   How many times per day do you typically consume dairy? 2   What types of dairy do currently eat? Select all that apply:  Milk;Cheese;Yogurt;Ice cream       Non-Dairy Alternative Intake:  11/8/2022   How many times per day do you typically consume non-dairy alternatives? 1   What types of non-dairy alternatives do currently eat? Select all that apply:  Oat milk       Sweets Intake:  11/8/2022   How many times per day do you typically consume sweets? 3      Beverage Intake:  11/8/2022   How many 8 oz cups of water do you typically consume per day?  4 to 5   How many 8 oz cups of caffeine do you typically consume per day?  3 to 4   How many drinks of alcohol do you typically consume per week (1 drink = 5 oz wine, 12 oz beer, 1.5 oz spirits)?   1 to 3       Lifestyle Recall:  11/8/2022    What time did you wake up? 7 AM   What time did you go to sleep? 12 AM   What time did you have breakfast? 9-10 AM   Where did you have breakfast?  Work   What time did you have a morning snack? No snack   What time did you have lunch? 11AM-12 PM   Where did you have lunch?  Work   What time did you have an afternoon snack? No snack   What time did you have dinner? 6-7 PM   Where did you have dinner?  Home   What time did you have an evening snack? No Snack   What time of day did you exercise? 6 PM   Where did you exercise? Gym/Fitness Center          Additional concerns:    MEDICATIONS:  Current Outpatient Medications   Medication Sig Dispense Refill     albuterol (PROAIR HFA/PROVENTIL HFA/VENTOLIN HFA) 108 (90 Base) MCG/ACT inhaler USE 2 INHALATIONS EVERY 6 HOURS MCG/ACT 17 g 1     busPIRone (BUSPAR) 15 MG tablet Take 1 tablet (15 mg) by mouth 2 times daily 180 tablet 0     fexofenadine (ALLEGRA) 180 MG tablet Take 180 mg by mouth daily       levonorgestrel (KYLEENA) 19.5 MG IUD 1 each (19.5 mg) by Intrauterine route once for 1 dose 1 each 0     mometasone-formoterol (DULERA) 200-5 MCG/ACT inhaler USE 2 INHALATIONS TWICE A DAY Strength: 200-5 MCG/ACT 39 g 0     montelukast (SINGULAIR) 10 MG tablet Take 1 tablet (10 mg) by mouth At Bedtime 90 tablet 2     traZODone (DESYREL) 50 MG tablet TAKE 1 TABLET AT BEDTIME AS NEEDED (NEED  TO BE SEEN FOR FURTHER REFILLS) 30 tablet 11     triamcinolone (KENALOG) 0.1 % external ointment Apply topically 2 times daily (Patient not taking: Reported on 10/13/2022) 30 g 0     venlafaxine (EFFEXOR XR) 150 MG 24 hr capsule Take 1 capsule (150 mg) by mouth daily 90 capsule 1       Dietary Supplements 11/8/2022   Individual Vitamin Supplements General multivitamin         ALLERGIES:   Allergies   Allergen Reactions     Seasonal Allergies         .na  LABS:  Last Basic Metabolic Panel:  Lab Results   Component Value Date     10/08/2020     05/05/2011     02/08/2011       Lab Results   Component Value Date    POTASSIUM 4.0 10/08/2020    POTASSIUM 3.9 05/05/2011    POTASSIUM 3.5 02/08/2011     Lab Results   Component Value Date    CHLORIDE 107 10/08/2020    CHLORIDE 104 05/05/2011    CHLORIDE 100 02/08/2011     Lab Results   Component Value Date    MARYSE 8.9 10/08/2020    MARYSE 8.6 05/05/2011    MARYSE 8.6 02/08/2011     Lab Results   Component Value Date    CO2 31 10/08/2020    CO2 25 05/05/2011    CO2 22 02/08/2011     Lab Results   Component Value Date    BUN 8 10/08/2020    BUN 7 05/05/2011    BUN 5 02/08/2011     Lab Results   Component Value Date    CR 0.76 10/08/2020    CR 0.48 05/05/2011    CR 0.64 02/08/2011     Lab Results   Component Value Date    GLC 91 10/08/2020    GLC 95 10/18/2019    GLC 94 09/21/2018       Last Glucose Profile:   Hemoglobin A1C   Date Value Ref Range Status   10/13/2022 5.5 0.0 - 5.6 % Final     Comment:     Normal <5.7%   Prediabetes 5.7-6.4%    Diabetes 6.5% or higher     Note: Adopted from ADA consensus guidelines.   09/20/2021 5.6 0.0 - 5.6 % Final     Comment:     Normal <5.7%   Prediabetes 5.7-6.4%    Diabetes 6.5% or higher     Note: Adopted from ADA consensus guidelines.   10/08/2020 5.2 0 - 5.6 % Final     Comment:     Normal <5.7% Prediabetes 5.7-6.4%  Diabetes 6.5% or higher - adopted from ADA   consensus guidelines.         Last Lipid Profile:   Cholesterol   Date Value Ref Range Status   10/13/2022 155 <200 mg/dL Final   09/20/2021 171 <200 mg/dL Final   10/08/2020 171 <200 mg/dL Final   10/18/2019 147 <200 mg/dL Final   09/21/2018 147 <200 mg/dL Final     HDL Cholesterol   Date Value Ref Range Status   10/08/2020 49 (L) >49 mg/dL Final   10/18/2019 53 >49 mg/dL Final   09/21/2018 53 >49 mg/dL Final     Direct Measure HDL   Date Value Ref Range Status   10/13/2022 54 >=50 mg/dL Final   09/20/2021 51 >=50 mg/dL Final     LDL Cholesterol Calculated   Date Value Ref Range Status   10/13/2022 90 <=100 mg/dL Final   09/20/2021 101 (H) <=100 mg/dL  Final   10/08/2020 99 <100 mg/dL Final     Comment:     Desirable:       <100 mg/dl   10/18/2019 76 <100 mg/dL Final     Comment:     Desirable:       <100 mg/dl   09/21/2018 81 <100 mg/dL Final     Comment:     Desirable:       <100 mg/dl     Triglycerides   Date Value Ref Range Status   10/13/2022 55 <150 mg/dL Final   09/20/2021 97 <150 mg/dL Final   10/08/2020 113 <150 mg/dL Final   10/18/2019 90 <150 mg/dL Final   09/21/2018 63 <150 mg/dL Final     Comment:     Fasting specimen     Cholesterol/HDL Ratio   Date Value Ref Range Status   06/18/2015 2.6 0.0 - 5.0 Final   06/17/2009 2.4 0.0 - 5.0 Final       Most recent CBC:  Recent Labs   Lab Test 10/13/22  1205 09/20/21  1316 10/08/20  0901   WBC 6.7 8.0 6.5   HGB 12.0 12.1 12.0   HCT 36.1 36.5 37.4    270 281     Most recent hepatic panel:  No lab results found.    Invalid input(s): TAM, ALP  Most recent creatinine:  Recent Labs   Lab Test 10/08/20  0901   CR 0.76       No components found for: GFRESETIMATEDLASTLAB(gfrestblack:1@  Lab Results   Component Value Date    ALBUMIN 4.5 06/17/2009       Last Thyroid Profile:   TSH   Date Value Ref Range Status   10/13/2022 1.01 0.40 - 4.00 mU/L Final   09/20/2021 1.50 0.40 - 4.00 mU/L Final   10/08/2020 1.27 0.40 - 4.00 mU/L Final   10/18/2019 1.26 0.40 - 4.00 mU/L Final   09/21/2018 1.43 0.40 - 4.00 mU/L Final       Last Mineral Profile:   Ferritin   Date Value Ref Range Status   10/08/2020 38 12 - 150 ng/mL Final     Iron   Date Value Ref Range Status   10/08/2020 85 35 - 180 ug/dL Final     Iron Binding Cap   Date Value Ref Range Status   10/08/2020 294 240 - 430 ug/dL Final       Autoimmune & Inflammatory   No results found for: CRP      Last Vitamin Profile:   No results found for: HIS274, IAXX607, [id number], VITD3, D2VIT, D3VIT, DTOT, [id number], [id number], [id number], [id number], [id number], [id number]    ANTHROPOMETRICS:  Vitals:   BP Readings from Last 1 Encounters:   10/13/22 116/78     Pulse  "Readings from Last 1 Encounters:   10/13/22 77     Estimated body mass index is 25.25 kg/m  as calculated from the following:    Height as of 10/13/22: 1.595 m (5' 2.8\").    Weight as of 10/13/22: 64.2 kg (141 lb 9.6 oz).    Wt Readings from Last 5 Encounters:   10/13/22 64.2 kg (141 lb 9.6 oz)   07/30/22 61.9 kg (136 lb 6 oz)   03/21/22 61.4 kg (135 lb 7 oz)   12/09/21 62.1 kg (137 lb)   10/28/21 62.8 kg (138 lb 6.4 oz)       NUTRITION DIAGNOSIS:   1. Altered nutrition-related laboratory values related to endocrine dysfunction as evidenced by elevated BMI, HbA1c     2. Altered nutrition-related laboratory values related to cardiac as evidenced by low HDL.          NUTRITION INTERVENTION:         Long Term Goals:   Goal: Lipid profile; Increase HDL > 49mg/dL within 6 months   Goal: Increase healthy relationship with food   Goal: Lose 20-24lbs in 6 months, recommend average 1-2lbs per week of weight loss.           Short Term Goals:  Goal 1: Focus on smoothie for breakfast, ---add in unsweetened flax, hemp or coconut milk, 1 tbsp of healthy fat such as flax seed ground or juanita seed ground. serving of fruit 1 cup of berries. Veggie (optional), 1 scoop of plant based protein powder or collagen powder, 1 serving of fruit and veggie such as kale, spinach, or veggie powder even spirulina - see recipes below or details)      Also, check out some of the other breakfast ideas provided such as oatmeal (naturally gluten free) with dairy free alternative milk, flax seed, nuts such as walnuts, fruit such as berries and protein like eggs or chicken/turkey sausage.      Goal 2:  Make sure to eat consistent meals daily with focus on balanced lunch and at least 1-2 snacks daily around 10am and 3pm.  Eating every three to four hours will help keep your insulin and blood sugar normal to help with weight loss  - see meal plan and recipe ideas in the cardi metabolic guides provided.       2 hard boiled eggs.     Greek Yogurt with " Blackberries    Fresh Yellow Pear or veggie (radishes, bell peppers, baby cucumbers, carrots or sugar snap peas) with Hummus - try the mini to go packs to help with portion control     Marinated Olives* and Kefir     Purple Plum with Mixed Nuts    Celery with [place] Butter    Dark Chocolate, 70% or higher Cocoa and Pistachio Nuts    Banana with almond butter or sunflower seed butter     Apple with peanut butter topped with cinnamon     Baby cucumber or carrots, bell pepper, radishes or sugar snap peas with individual servings of guac     Low sodium cottage with peaches or mandarin oranges     Sunflower seeds with strawberries     Turkey jerky or 100% grass fed beef jerky     Plant based protein bar     Simple Mills [place] Flour Crackers or Shirlene Gone Cracker dip with hummus or guac     Avocado on slice of gluten free bread recommend base culture brand or 100% whole grain bread    Goal 4: Monitor portion sizes   - consider just having 1 serving of grains per day and swap for lower carb/sugar options such as non-starchy veggies. Read labels for low sugar and carbs.    - consider having alcohol in moderation and or avoiding. Alcohol can increase triglycerides and fat in the liver and create blood sugar imbalances leading to weight gain.      Goal 5: Recommend the following supplements to start     One Multivitamin by Pure encapsulations - 1 capsule per day with food     Nordic Naturals Plus vitamin D - 1-2 capsules daily with food ----[email or web address]-[email or web address]     Look on amazon to find the supplement recommendations or talk with the [facility] to special order. They may be able to offer a discount as well.        Smoothie Recipes to Kickoff Your Success!!     Pineapple Coconut Smoothie      Makes 1-2 serving     1-2 cup unsweetened coconut milk   1 scoop mckeon vanilla plant based protein powder or collagen protein powder by vital proteins (great for just having no  flavor - plain)   1 cup frozen pineapple chunks   1 handful spinach (optional)     banana (optional - if added use less pineapple so not as high of sugar content)   1 tbsp shredded unsweetend coconut to top (optional)  1 tbsp ground flax seed (optional)     Method:     In  (recommend vitamix or blend tech) add dairy free alternative milk or filtered water. Add in the rest of ingredients blend on high for 2 mins. If desire thinner consistency add in the water or dairy alternative. Enjoy :)    Kiwi Spirulina Smoothie      Makes 1 servings     1 cup unsweetened macadamia nut milk or filtered water   1 scoop mckeon vanilla plant based protein powder or 1-2 scoops collagen powder from vital proteins (plain)   1-2 Kiwi s peeled   1 handful cilantro   1 lime juiced    - 1 avocado   2 tsp spirulina   1 inch eduard fresh pilled      Method:     In  (recommend vitamix or blend tech) add dairy free alternative milk or filtered water. Add in the rest of ingredients blend on high for 2 mins. If desire thinner consistency add in the water or dairy alternative. Enjoy :)     Green Detox Smoothie      Makes 1 servings     1 cup unsweetened macadamia nut milk or filtered water (I like filtered water more for this recipe)  1-2 scoops collagen powder from vital proteins (plain)   1 small granny del castillo apple (prefer organic - lower pesticides and endocrine disruptors)   1 handful cilantro   1 lime juiced    - 1 avocado (optional)   1 cucumber      Method:     In  (recommend vitamix or blend tech) add dairy free alternative milk or filtered water. Add in the rest of ingredients blend on high for 2 mins. If desire thinner consistency add in the water or dairy alternative. Enjoy :)        Spirulina Banana Smoothie      Makes 1 serving     1-2 cup unsweetened macadamia nut milk or filtered water (I like filtered water more for this recipe)  1 scoop mckeon vanilla plant based protein powder     banana   1 handful spinach    -  1 avocado  1-2 tsp spirulina    1 tbsp ground flax seed      Method:     In  (recommend vitamix or blend tech) add dairy free alternative milk or filtered water. Add in the rest of ingredients blend on high for 2 mins. If desire thinner consistency add in the water or dairy alternative. Enjoy :)     Berry Blast Smoothie      Makes 1 serving     1-2 cup unsweetened macadamia nut milk or filtered water (I like filtered water more for this recipe)  1 scoop mckeon vanilla plant based protein powder or collagen protein powder by vital proteins (plain)   1 cup mixed berries    1 handful spinach    - 1 avocado  1-2 tsp raw organic maqui berry powder by Sun[employer] superfoods (optional but very immune boosting and jammed with phytonutrients)   1 tbsp ground flax seed      Method:     In  (recommend vitamix or blend tech) add dairy free alternative milk or filtered water. Add in the rest of ingredients blend on high for 2 mins. If desire thinner consistency add in the water or dairy alternative. Enjoy :)     Superfood + Immune Boosting Powders:  Maqui Berry Powder   Turmeric Powder   Spirulina Powder   Glutamine Powder   Matcha Powder   Sammie fresh or powdered   Kale, dandelion greens or spinach         Omega 3 and Protein Desired Toppings:   Add some healthy protein and omega 3 packed seeds for an extra special nutrient packed topping and a little extra crunch!   1 tsp -1 tbps ground flax seed   1 tsp -1 tbsp hemp seeds   1 tsp-1 tbsp juanita seeds      Mediterranean Approach: Eat whole, unprocessed real foods in their unprocessed forms such as fruits, vegetables, whole grains (prefer gluten free), nuts, legumes, extra virgin olive oil, spices, modest amounts of poultry, and fish.      Avoid inflammatory foods: Eliminate gluten found in wheat, barley, rye, oats, kamut, and spelt for at least 3 weeks to identify any hidden reaction. Gluten sensitivity or allergy can cause many different types of symptoms form migraines  to fatigue to weight gain.  If symptoms go away this is a clue you may be reactive to gluten.  Dairy can also be inflammatory consider eliminating for 3 weeks as well. The proteins like casein and whey in dairy can irritate and inflame your gut. Also the sugar lactase in dairy can cause digestive issues in addition to blood sugar spikes.     Cardiometabolic Food plan - 1,800-2,200 calories per day     Protein 10-12 servings per day - include at each meal to stabilize blood sugars   (Choose 3oz or 21g per meal and aim for 1oz of 7g for snacks)   - Strive for 1-2 servings of fish per week especially of higher omega-3 fatty acid containing fish such as salmon.     Legumes <2 serving per day     Dairy alternatives 2-3 serving per day     Nuts and seeds 3-4 servings per day - great to incorporate as snacks    Fats and oils 4 servings per day     Non starchy vegetables 8-10 servings per day     Starchy vegetable limit 1 serving per day as they tend to impact blood sugar (they are moderate-GI).     Fruits 2 servings per day - best to couple with a little bit of protein or fat to offset a rise in blood sugars (they are low-moderate-GI foods).     Whole grains <2 serving per day - try gluten free whole grains instead        Incorporate protein powder daily:    Plant based hemp (recommended brands: [place] V[place]ia, Nutiva, Just Hemp Protein, Xavi's Red Mill)      Plant based pea (recommended brands: Naked Pea, Now Sports). If you want to try a combo of pea and hemp the brand Poe in vanilla or chocolate is a great option.     Try Bone broth protein powder or collagen peptides in liquid bone broth, vegetable broth or 12 oz of water as snack. The bone broth powder and collagen can be used for soups as well. This can help provide essential amino acids and minerals that heal your gut as well as balance blood sugars. A great option if you have a hard time tolerating solids.     Can also consider pre made shakes if unable to make  smoothies.      Brand examples that are gluten and dairy free to try:   1) Orgain Vegan Protein Shakes, 20g of Plant Based Protein, Creamy Chocolate - Gluten Free, No Dairy, Soy, or Preservatives, No Added Sugar  2) Pirq, Vegan Protein Shake, Turmeric Curcumin, Huma, Plant-Based Protein Drink, Gluten-Free, Dairy-Free, Soy-Free, Non-GMO, Vegetarian, Kosher, Keto, Low Carb, Low Calorie  3) OWYN Pro Elite Vegan Plant-Based High Protein Shake, 35g Protein, 9 Amino Acids, Omega-3, Prebiotics, Superfoods Greens for Workout and Recovery, 0g Net Carbs, Zero Sugar, Keto  4) Ripple Vegan Protein Shake  Chocolate  20g Nutritious Plant Based Pea Protein  Shelf Stable  No GMOs, Soy, Nut, Gluten, Lactose  5) NOWBOX Glucose Support 1.2 Plant based, dairy free, low glycemic index  https://Epiphany.Yippy/products/glucose-support-1-2-vanilla    Choose Low Glycemic (GI) foods: Regulate your sugar levels by eating foods that do not spike blood sugars.  Eat low -GI foods so only small fluctuations in blood glucose and insulin levels are produced.      Examples of low-GI foods: nuts, seeds, GF oats, most vegetables especially non-starchy and fruits.     Medium or high-GI foods should be eaten with a protein or fat, both of which blunt the glycemic effect of these foods. This reduces the overall glycemic impact of a meal.   Ex: Most grains and starchy veggies are medium/high GI.     Avoid foods containing refined sugars, artificial sweeteners, and refined grains they are considered high-GI because they lead to sharp increases in blood sugars levels, which increase insulin sensitivity causing increased TG, and low good cholesterol (HDL).   Ex: cakes, cookies, pies, bread, sodas, fruit drinks, presweetened tea, coffee drinks, energy or sport drinks, flavored milk and other processed foods.     Choose foods high in fiber: Aim for at least 5 grams of fiber per serving of food or a total of 25-35 grams fiber per day. Remember, when  looking at the label, you can take the fiber away from the total carbs. Ex:15g of total carbs - 4g of fiber = 11g net carbs     Insoluble fiber acts like a bulky  inner broom,  sweeping out debris from the intestine and creating more motility and movement.      Soluble fiber attracts water and swells, creating a gel that slows digestion.  Also, slows the release of glucose from foods into the blood which stops spikes in blood sugar levels.  Soluble fiber traps toxins in the gut, helping to carry them to excretion and provides healthy bacteria in the digestive tract.     Choose High Quality Fats: Adding anti-inflammatory fats into your diet such as fish (salmon, herring, mackerel, and sardines), omega 3 eggs, juanita seeds, ground flax seeds/milk, hemp seeds/milk, walnuts and some other certain leafy greens will increase omega-3 fats to omeaga-6 fats ratio.     Therapeutic fats both monounsaturated and polyunsaturated to include daily: ground flaxseeds, unsalted mixed nuts, avocados, olives, extra-virgin olive oil.     Emphasize high-quality oils and fats in the diet daily such as avocado oil, coconut oil, flaxseed, olive, sesame. Ex: 1 tsp to 1 tbsp of MCT oil from coconuts can be added into coffee, smoothies, and salad dressings per day.     Avoid trans fats found in processed foods     Drink more water. Hydration is critical, so drink at least six to eight glasses of water a day. Drink more water between meals and less at meals.     Try adding herbal teas (sugar free) or lemon/lime/cucumber/fruit to water for flavor. Avoid artificial sweetener packets to flavor your water.     Cut back on coffee switch to green tea. Avoid adding sugar and milk to coffee instead use dairy alternatives such as almond, flax, coconut milk.     Try another coffee substitute such as   -[email or web address]/  -https://ENOVIX.Perceivant    Focus on high quality micro-nutrients.     One Multivitamin by Pure encapsulations - 1 capsule per day  with food     Nordic Naturals Plus vitamin D - 1-2 capsules daily with food    Rebuild the friendly bacteria in your microbime. Start by taking a probiotic supplement, they will help rebuild the healthy bacteria so essential to good gut health.     Recommend one of the following high quality probiotics:   1. BIOHM health (30 billion cfus per capsule) from [employer] to help break down digestive plaque and get in amylase and variety of good bacteria.  Does not need to be refrigerated great for traveling. Take 1 capsule daily anytime with our without food   2. VISBIOME (112.5 billion bacteria per capsule needs to be refrigerated).  Take 1 capsule daily anytime with our without food.     In addition you can help establish healthy gut microflora by consuming foods that contain live active cultures ( probiotics ) such as kimchi,     Increase physical activity. Moving our body helps move our bowels and speeds up your metabolism.     Exercise 15-60 minutes daily--whether that looks like burst training, yoga, or vigorous walking.    Manage your stress. Stress can negatively impact the way you digest foods and absorb nutrients leading to more digestive issues (constipation, diarrhea, indigestion, nausea etc), imbalanced blood sugars and weight imbalances. Try to focus on the following relaxation techniques:     Regular exercise such as walking     Yoga    Meditation     Breathing techniques     Time management     Work on mindset shifts to help with motivation - check out the daily affirmations abel and motivation abel to provide inspiration and accountability on your phone. The following are two really good applications to add to phone:      1. I am - Daily Affirmations   Manifest motivation reminders by Monkey Taps     2.   Motivation - Daily quotes  Reminders to think positive by Monkey Taps     Look into dailyWP [email or web address] wellness journal.   Dailygreatness Wellness includes a daily food and exercise journal,  weekly and quarterly goal planners, training and clean eating tips, weekly shopping lists, healthy habit reminders & mindset coaching.     If you're looking for a journal to help you focus on holistic health, wellness, and vitality, this wellness journal is for you.    Track what you eat. Writing down or tracking through an abel what you eat as well as how you feel acn help you identify patterns in your symptoms. This can help you become more aware and creat a diet that is right for you.     Pick a food tracking abel:     Through the mysymptoms abel, you can track symptoms, bowl movements, medications, stress, exercise, sleep and foods as well as beverages to become more mindful. [email or web address]      Through the Opsonanesspal abel, you can focus more on calories and macronutrient's of foods to balance blood sugars or monitor weight. You can track blood sugars in the notes section along with symptoms, bowl movements, medications, stress, exercise, water intake and sleep.   Note: You don't have to journal forever and it is more important that you have consistent meals as well as snacks while focusing on adding in healthy foods.    NUTRITION RESOURCES:  1. Purchase Eat Fat Get Thin by Manolo Rosenberg Book/Cookbook   2. IFM Cardiometabolic Packet     Functional Nutrition Fundamentals     Comprehensive Guide    Meal plan with recipes             PATIENT'S BEHAVIOR CHANGE GOALS:   See nutrition intervention for patient stated behavior change goals. AVS was printed and given to patient at today's appointment.    MONITOR / EVALUATE:  Registered Dietitian will monitor/evaluate the following:     Beliefs and attitudes related to food    Food and nutrition knowledge / skills    Food / Beverage / Nutrient intake     Pertinent Labs    Progress toward meeting stated nutrition-related goals    Readiness to change nutrition-related behaviors    Weight change    Digestion     COORDINATION OF CARE:  Follow up with primary care  provider       FOLLOW-UP:  Follow-up appointment scheduled on Dec 28th at 11am video visit    Time spent in minutes: 60 minutes 4 units   Encounter: Individual    Rand Regalado RD, CLT, LD  Integrative Registered Dietitian

## 2022-11-08 NOTE — LETTER
11/8/2022         RE: Barbara Yates  [address]  [facility] [address]        Dear Nicole,    Thank you for referring your patient, Barbara Yates, to the nutrition services at [facility]. I enjoyed meeting with Barbara and helping her create nutrition plan with recipes to meet her weight loss goals. Please see a copy of my visit note below for more details. I look forward to following up with her in December to monitor progress.     Thanks,   Rand       Medical Nutrition Therapy  Visit Type: Initial assessment and intervention    Visit Details    How would you like to obtain your AVS? MyChart  If the correspondence for visit is dropped, how would you like your dietitian to reconnect with you:   call back by phone? Yes    Text to cell phone: [phone]  Will anyone else be joining your video visit or telephone call? No  {If patient encounters technical issues they should call [phone]    Type of service:  Video Visit or Telephone     Start Time: 2:35 PM    End Time:3:36 PM    Originating Location (pt. Location): Home    Distant Location (provider location):  [facility] WORKING VIRTUAL FROM HOME     Platform used for Video Visit: Macie      Referring Provider: Nicole Soto  [facility]     REASON FOR REFERRAL:   Barbara Yates is a 42 year old female who is interested in Medical Nutrition Therapy (MNT) and education related to weight management.   She is accompanied by self.     How to incorporate more protein. She is trying to get more nutrients from the food that she eats instead of having to just take supplements. She tried taurus and more keto based. She tried the meal replacements. She got the keto flu and don't having naturals sugars and more artificial sugars didn't fit well. Her background is  and its harder to go lower carb and cant cut certain things out of her diet. She doesn't know how to read  labels. Just doesn't know and needs to find the balance. Less likely to fall of the strict and ridged plan. Worry about the holidays coming up with baking this and that. Needs to look into the moderation and what exactly and understand nutrition better. Does she need to be concerned about macros vs calories and all these different things ways of foods, how to read them and what is better for weight loss. Toss out all the things you can't have an diets start focusing on what you can have. No subscription, strict foods you have to use. More flexibility an recipes to try out and tweak to her own liking. Got sick after cutting everything cold turkey. She shocked her system and got shocked. Quitting the every day foods used to and going to something else didn't work. Being more aware of foods now and pay attention to the label and macro or carb counts ex for oat milk.     You know for her I feel like getting down to 120lbs would be easier for her. She isn't sure of her current weight but constantly having low energy and never used to have that before. She has a lot of joint problems. Her stamina just isn't the same. She just wants to exercise and eat healthier and see where it takes her. Having a more healthy lifestyle. She is borderline diabetic.     Has coffee for breakfast skipping food usually or goes out every other day to so something quick and easy will do caribou mocha with oatmilk or milk cinnamon     First meal around lunch hour 11am - usually has salad with a protein in the office and if crunched on time has ramen noodles with egg if she gets a chance to add.     Sometimes does carrots with dressing as snack    Dinner depends she will get a salad or soup from chick faley or sweets or fried egg with rice or stir ramírez with rice or chili just depends on the eveing more often home cooked meals than running out and getting something      NUTRITION ASSESSMENT:   No flowsheet data found.     Neurological 11/8/2022    Tension Headache Current       No flowsheet data found.   No flowsheet data found.   No flowsheet data found.    No flowsheet data found.   No flowsheet data found.   Skin 11/8/2022   Acne Current   Dry Skin Current      No flowsheet data found.   No flowsheet data found.   Psychological 11/8/2022   Depression/Anxiety Past;Current      Genitourinary 11/8/2022   PMS Past;Current      Womens Health Assessment 11/8/2022   Hysterectomy No   I am pregnant.  No   I am breastfeeding. No   I want to become pregnant within the next year . No   What do you use for contraception?  IUD   Any problems with current birth control method?   No   Date of last menstrual period 27993   Are your periods irregular? No   Days from the start of one period to the next. 45   Days of Menstral Flow 1   Associated with menstral periods. Headaches;Mood swings;Bloating;Breast tenderness   Are you officially in menopause? (no period for one year or longer)  No        Past Medical History:  Past Medical History:   Diagnosis Date     Anxiety      Attention deficit disorder with hyperactivity(314.01)      Cervical high risk HPV (human papillomavirus) test positive     11/28/05, 6/14/06, 9/20/21     Depression      Depressive disorder Unknown     H/O colposcopy with cervical biopsy 06/07/2005    ECC- within normal limits. no dysplasia     H/O colposcopy with cervical biopsy 07/13/2006    biopsies and ECC- within normal limits. no dysplasia noted.     Infectious mononucleosis 01/01/2004     LSIL (low grade squamous intraepithelial lesion) on Pap smear 05/06/2005 9/20/21       Previous Surgeries:   Past Surgical History:   Procedure Laterality Date     ARTHROSCOPY KNEE RT/LT Left 09/08/2016     AS CLOSED TX NASAL BONE FRACTURE W/ STABILIZATION  03/01/2016     EXAM OF VAGINA,COLPOSCOPY  2006, 2005     EYE SURGERY  01/01/2008    Lasik eye surgery     ORTHOPEDIC SURGERY  9/8/16 and 12/12/16    knee/tibia        Family History:  Family History    Problem Relation Age of Onset     Hypertension Mother      Diabetes Mother      Unknown/Adopted Father      Diabetes Father      Family History Negative Other         Lifestyle History:  Lifestyle 11/8/2022   Do you feel your life is stressful right now?  No   Are you currently implementing any strategies to help manage stress? Yes   What are you doing to manage stress?  Meditation;Breathing exercises;Affirmations;Aromatherapy;Counseling;Journaling;Indulge in food;Listening to music;Sleep;Gratitude        Exercise History:  Exercise 11/8/2022   Does your occupation require extended periods of sitting?  Yes   Does your occupation require extended periods of repetitive movements (ex: walking or lifting)?  Yes   Do you currently participate in any forms of exercise? No   Rate your level of motivation for including exercise in your routine (0=none, 10=high): 8   Do you have any medical conditions, pain, injuries, surgeries etc. restricting you from exercise? No        Sleep History:  Sleep 11/8/2022   How many hours (on average) do you sleep per night? 7-9   What time do you turn off the lights? 10 PM   How long does it take for you to fall asleep? 25-30 mins   What time do you stop using electronic devices? 11 PM   What time do you wake up? 6 AM   When do you eat your first meal?  9 AM   Do you feel well-rested during the day?  No   Do you take naps?  Yes   Do you have a comfortable bedroom environment (cool, quiet, dark, etc)? Yes   Do you have a sleep routine/ ritual that you do before bed?  Yes   How many hours do you spend per day looking at a screen (TV, computer, tablet and phone)? 10 +   Select all factors that apply to your current sleep habits: Difficulty falling asleep;Have difficulty waking up in the morning;Not hungry in the morning;Take medication for sleep on most night of the week;Snore loudly or have been told you stop breathing;Night sweats;Drink coffee, soda or energy drinks after noon;Have tried  supplements (ie: melatonin) for sleep;Grinding teeth        Nutrition History:  Nutrition 11/8/2022   Have you ever had a nutrition consultation? Yes   Do you currently follow a special diet or nutritional program? No   What do you feel are the biggest barriers getting in the way of achieving you nutritional goals? Motivation/Readiness to change;Financial concerns;Lack of social support;Lack of time;Lack of prep/cooking skills;Work (such as lack of time to eat, unhealthy choices, etc.);Lack of nutrition knowledge;TV/computer/social media   Do you have any food allergies, sensitivities or intolerances?  No       Digestion 11/8/2022   Do you experience stomach pains/cramping? Monthly   Do you experience bloating?  Weekly   Do you experience gas?  Daily   Do you experience heartburn/acid reflux/indigestion? Rarely   How often do you have a bowel movement? Every other day   What is a typical bowel movement like for you? Select all that apply: Varies a lot      Food Access:  11/8/2022   Who does the grocery shopping? Self;Mother;Spouse/Partner;Family member   How often is grocery shopping done? 2 times per week   Where do you usually receive your groceries from? Select all that apply: Target;[employer]   Do you read food labels? No   Who does the cooking? Select all that apply: Self;Mother;Spouse/Partner;Family Member   How many meals do you eat out per week?  3 to 5   What restaurants do you typically choose? Fast Food (Taco Bell, Shell's, Subway, etc.)      Daily Patterns: 11/8/2022   How many days per week do you have breakfast? 1   How many days per week do you have lunch? 6   How many days per week do you have dinner? 7   How many days per week do you have snacks? 5      Protein Intake: 11/8/2022   How many times per day do you typically consume a protein source(s)? 2   What types of protein do you currently eat?  Ground Beef;Beef Ribs;Steak;Hamburgers;Beef Roast;Pork  Chops;Pork Ribs;Mckeon/Beacon Mckeon;Deli Ham;Sausage;Other Pork;[place];Tuna;Shrimp;Lobster;Other Fish;Chicken Breast;Chicken Thighs/Legs;Ground Chicken;Turkey Breast;Deli Turkey;Eggs;Tofu       Fat Intake:  11/8/2022   How many times per day do you typically consume healthy fat(s)? 1   What types of health fats do you currently eat? Select all that apply:  Almonds;Walnuts;Sunflower seeds;Peanut butter;Velazquez;Margarine;Butter;Vegetable oil;Salad dressings;Avocado oil;Olive oil;Avocado       Fruit Intake:  11/8/2022   How many times per day do you typically consume fruits? 1   What types of fruit do currently eat? Apple;Banana;Blackberries;Blueberries;Cantaloupe;Cherries;Grapes;Grapefruit;Honeydew;[place];Melon;Nectarine;Pears;Peaches;Pineapple;Plums;Raspberries;Tangerines;Watermelon       Vegetable Intake:  11/8/2022   How many times per day do you typically consume vegetables? 1   What types of vegetables do you currently eat? Artichoke;Asparagus;Beans;Broccoli;Cabbage;Carrots;Cauliflower;Celery;Corn;Cucumber;Green onions/scallions;Greens (jluis, kale etc);Peas;Potato (baked, boiled, mashed, French fries);Radish;Soy beans;Spinach;Squash (summer, zucchini);Sugar snap peas;Tomato;Yam, sweet potato      Grain Intake:  11/8/2022   How many times per day do you typically consume grains? 3   What types of grains do currently eat? Select all that apply:  Bagel (non-gluten free);Breads (non-gluten free);Cereals (non-gluten free);Chips (non-gluten free);Crackers (non-gluten free);Muffins (non-gluten free);Pancakes/waffles (non-gluten free);Pasta (non-gluten free);Pretzels (non-gluten free);Other (non-gluten free)       Dairy Intake:  11/8/2022   How many times per day do you typically consume dairy? 2   What types of dairy do currently eat? Select all that apply:  Milk;Cheese;Yogurt;Ice cream       Non-Dairy Alternative Intake:  11/8/2022   How many times per day do you typically consume non-dairy alternatives? 1    What types of non-dairy alternatives do currently eat? Select all that apply:  Oat milk       Sweets Intake:  11/8/2022   How many times per day do you typically consume sweets? 3      Beverage Intake:  11/8/2022   How many 8 oz cups of water do you typically consume per day?  4 to 5   How many 8 oz cups of caffeine do you typically consume per day?  3 to 4   How many drinks of alcohol do you typically consume per week (1 drink = 5 oz wine, 12 oz beer, 1.5 oz spirits)?   1 to 3       Lifestyle Recall:  11/8/2022   What time did you wake up? 7 AM   What time did you go to sleep? 12 AM   What time did you have breakfast? 9-10 AM   Where did you have breakfast?  Work   What time did you have a morning snack? No snack   What time did you have lunch? 11AM-12 PM   Where did you have lunch?  Work   What time did you have an afternoon snack? No snack   What time did you have dinner? 6-7 PM   Where did you have dinner?  Home   What time did you have an evening snack? No Snack   What time of day did you exercise? 6 PM   Where did you exercise? Gym/Fitness Center          Additional concerns:    MEDICATIONS:  Current Outpatient Medications   Medication Sig Dispense Refill     albuterol (PROAIR HFA/PROVENTIL HFA/VENTOLIN HFA) 108 (90 Base) MCG/ACT inhaler USE 2 INHALATIONS EVERY 6 HOURS MCG/ACT 17 g 1     busPIRone (BUSPAR) 15 MG tablet Take 1 tablet (15 mg) by mouth 2 times daily 180 tablet 0     fexofenadine (ALLEGRA) 180 MG tablet Take 180 mg by mouth daily       levonorgestrel (KYLEENA) 19.5 MG IUD 1 each (19.5 mg) by Intrauterine route once for 1 dose 1 each 0     mometasone-formoterol (DULERA) 200-5 MCG/ACT inhaler USE 2 INHALATIONS TWICE A DAY Strength: 200-5 MCG/ACT 39 g 0     montelukast (SINGULAIR) 10 MG tablet Take 1 tablet (10 mg) by mouth At Bedtime 90 tablet 2     traZODone (DESYREL) 50 MG tablet TAKE 1 TABLET AT BEDTIME AS NEEDED (NEED  TO BE SEEN FOR FURTHER REFILLS) 30 tablet 11     triamcinolone (KENALOG)  0.1 % external ointment Apply topically 2 times daily (Patient not taking: Reported on 10/13/2022) 30 g 0     venlafaxine (EFFEXOR XR) 150 MG 24 hr capsule Take 1 capsule (150 mg) by mouth daily 90 capsule 1       Dietary Supplements 11/8/2022   Individual Vitamin Supplements General multivitamin         ALLERGIES:   Allergies   Allergen Reactions     Seasonal Allergies         .na  LABS:  Last Basic Metabolic Panel:  Lab Results   Component Value Date     10/08/2020     05/05/2011     02/08/2011      Lab Results   Component Value Date    POTASSIUM 4.0 10/08/2020    POTASSIUM 3.9 05/05/2011    POTASSIUM 3.5 02/08/2011     Lab Results   Component Value Date    CHLORIDE 107 10/08/2020    CHLORIDE 104 05/05/2011    CHLORIDE 100 02/08/2011     Lab Results   Component Value Date    MARYSE 8.9 10/08/2020    MARYSE 8.6 05/05/2011    MARYSE 8.6 02/08/2011     Lab Results   Component Value Date    CO2 31 10/08/2020    CO2 25 05/05/2011    CO2 22 02/08/2011     Lab Results   Component Value Date    BUN 8 10/08/2020    BUN 7 05/05/2011    BUN 5 02/08/2011     Lab Results   Component Value Date    CR 0.76 10/08/2020    CR 0.48 05/05/2011    CR 0.64 02/08/2011     Lab Results   Component Value Date    GLC 91 10/08/2020    GLC 95 10/18/2019    GLC 94 09/21/2018       Last Glucose Profile:   Hemoglobin A1C   Date Value Ref Range Status   10/13/2022 5.5 0.0 - 5.6 % Final     Comment:     Normal <5.7%   Prediabetes 5.7-6.4%    Diabetes 6.5% or higher     Note: Adopted from ADA consensus guidelines.   09/20/2021 5.6 0.0 - 5.6 % Final     Comment:     Normal <5.7%   Prediabetes 5.7-6.4%    Diabetes 6.5% or higher     Note: Adopted from ADA consensus guidelines.   10/08/2020 5.2 0 - 5.6 % Final     Comment:     Normal <5.7% Prediabetes 5.7-6.4%  Diabetes 6.5% or higher - adopted from ADA   consensus guidelines.         Last Lipid Profile:   Cholesterol   Date Value Ref Range Status   10/13/2022 155 <200 mg/dL Final    09/20/2021 171 <200 mg/dL Final   10/08/2020 171 <200 mg/dL Final   10/18/2019 147 <200 mg/dL Final   09/21/2018 147 <200 mg/dL Final     HDL Cholesterol   Date Value Ref Range Status   10/08/2020 49 (L) >49 mg/dL Final   10/18/2019 53 >49 mg/dL Final   09/21/2018 53 >49 mg/dL Final     Direct Measure HDL   Date Value Ref Range Status   10/13/2022 54 >=50 mg/dL Final   09/20/2021 51 >=50 mg/dL Final     LDL Cholesterol Calculated   Date Value Ref Range Status   10/13/2022 90 <=100 mg/dL Final   09/20/2021 101 (H) <=100 mg/dL Final   10/08/2020 99 <100 mg/dL Final     Comment:     Desirable:       <100 mg/dl   10/18/2019 76 <100 mg/dL Final     Comment:     Desirable:       <100 mg/dl   09/21/2018 81 <100 mg/dL Final     Comment:     Desirable:       <100 mg/dl     Triglycerides   Date Value Ref Range Status   10/13/2022 55 <150 mg/dL Final   09/20/2021 97 <150 mg/dL Final   10/08/2020 113 <150 mg/dL Final   10/18/2019 90 <150 mg/dL Final   09/21/2018 63 <150 mg/dL Final     Comment:     Fasting specimen     Cholesterol/HDL Ratio   Date Value Ref Range Status   06/18/2015 2.6 0.0 - 5.0 Final   06/17/2009 2.4 0.0 - 5.0 Final       Most recent CBC:  Recent Labs   Lab Test 10/13/22  1205 09/20/21  1316 10/08/20  0901   WBC 6.7 8.0 6.5   HGB 12.0 12.1 12.0   HCT 36.1 36.5 37.4    270 281     Most recent hepatic panel:  No lab results found.    Invalid input(s): TAM, ALP  Most recent creatinine:  Recent Labs   Lab Test 10/08/20  0901   CR 0.76       No components found for: GFRESETIMATEDLASTLAB(gfrestblack:1@  Lab Results   Component Value Date    ALBUMIN 4.5 06/17/2009       Last Thyroid Profile:   TSH   Date Value Ref Range Status   10/13/2022 1.01 0.40 - 4.00 mU/L Final   09/20/2021 1.50 0.40 - 4.00 mU/L Final   10/08/2020 1.27 0.40 - 4.00 mU/L Final   10/18/2019 1.26 0.40 - 4.00 mU/L Final   09/21/2018 1.43 0.40 - 4.00 mU/L Final       Last Mineral Profile:   Ferritin   Date Value Ref Range Status  "  10/08/2020 38 12 - 150 ng/mL Final     Iron   Date Value Ref Range Status   10/08/2020 85 35 - 180 ug/dL Final     Iron Binding Cap   Date Value Ref Range Status   10/08/2020 294 240 - 430 ug/dL Final       Autoimmune & Inflammatory   No results found for: CRP      Last Vitamin Profile:   No results found for: SJK631, IUBQ693, CUHV15LPMWU, VITD3, D2VIT, D3VIT, DTOT, [id number], [id number], [id number], [id number], [id number], [id number]    ANTHROPOMETRICS:  Vitals:   BP Readings from Last 1 Encounters:   10/13/22 116/78     Pulse Readings from Last 1 Encounters:   10/13/22 77     Estimated body mass index is 25.25 kg/m  as calculated from the following:    Height as of 10/13/22: 1.595 m (5' 2.8\").    Weight as of 10/13/22: 64.2 kg (141 lb 9.6 oz).    Wt Readings from Last 5 Encounters:   10/13/22 64.2 kg (141 lb 9.6 oz)   07/30/22 61.9 kg (136 lb 6 oz)   03/21/22 61.4 kg (135 lb 7 oz)   12/09/21 62.1 kg (137 lb)   10/28/21 62.8 kg (138 lb 6.4 oz)       NUTRITION DIAGNOSIS:   1. Altered nutrition-related laboratory values related to endocrine dysfunction as evidenced by elevated BMI, HbA1c     2. Altered nutrition-related laboratory values related to cardiac as evidenced by low HDL.          NUTRITION INTERVENTION:         Long Term Goals:   Goal: Lipid profile; Increase HDL > 49mg/dL within 6 months   Goal: Increase healthy relationship with food   Goal: Lose 20-24lbs in 6 months, recommend average 1-2lbs per week of weight loss.           Short Term Goals:  Goal 1: Focus on smoothie for breakfast, ---add in unsweetened flax, hemp or coconut milk, 1 tbsp of healthy fat such as flax seed ground or juanita seed ground. serving of fruit 1 cup of berries. Veggie (optional), 1 scoop of plant based protein powder or collagen powder, 1 serving of fruit and veggie such as kale, spinach, or veggie powder even spirulina - see recipes below or details)      Also, check out some of the other breakfast ideas provided such as " oatmeal (naturally gluten free) with dairy free alternative milk, flax seed, nuts such as walnuts, fruit such as berries and protein like eggs or chicken/turkey sausage.      Goal 2:  Make sure to eat consistent meals daily with focus on balanced lunch and at least 1-2 snacks daily around 10am and 3pm.  Eating every three to four hours will help keep your insulin and blood sugar normal to help with weight loss  - see meal plan and recipe ideas in the cardi metabolic guides provided.       2 hard boiled eggs.     Greek Yogurt with Blackberries    Fresh Yellow Pear or veggie (radishes, bell peppers, baby cucumbers, carrots or sugar snap peas) with Hummus - try the mini to go packs to help with portion control     Marinated Olives* and Kefir     Purple Plum with Mixed Nuts    Celery with [place] Butter    Dark Chocolate, 70% or higher Cocoa and Pistachio Nuts    Banana with almond butter or sunflower seed butter     Apple with peanut butter topped with cinnamon     Baby cucumber or carrots, bell pepper, radishes or sugar snap peas with individual servings of guac     Low sodium cottage with peaches or mandarin oranges     Sunflower seeds with strawberries     Turkey jerky or 100% grass fed beef jerky     Plant based protein bar     Simple [place] Flour Crackers or Shirlene Gone Cracker dip with hummus or guac     Avocado on slice of gluten free bread recommend base culture brand or 100% whole grain bread    Goal 4: Monitor portion sizes   - consider just having 1 serving of grains per day and swap for lower carb/sugar options such as non-starchy veggies. Read labels for low sugar and carbs.    - consider having alcohol in moderation and or avoiding. Alcohol can increase triglycerides and fat in the liver and create blood sugar imbalances leading to weight gain.      Goal 5: Recommend the following supplements to start     One Multivitamin by Pure encapsulations - 1 capsule per day with food     N[place]ic Naturals Plus  vitamin D - 1-2 capsules daily with food ----[email or web address]://[email or web address]     Look on amazon to find the supplement recommendations or talk with the [facility] to special order. They may be able to offer a discount as well.        Smoothie Recipes to Kickoff Your Success!!     Pineapple Coconut Smoothie      Makes 1-2 serving     1-2 cup unsweetened coconut milk   1 scoop mckeon vanilla plant based protein powder or collagen protein powder by vital proteins (great for just having no flavor - plain)   1 cup frozen pineapple chunks   1 handful spinach (optional)     banana (optional - if added use less pineapple so not as high of sugar content)   1 tbsp shredded unsweetend coconut to top (optional)  1 tbsp ground flax seed (optional)     Method:     In  (recommend vitamix or blend tech) add dairy free alternative milk or filtered water. Add in the rest of ingredients blend on high for 2 mins. If desire thinner consistency add in the water or dairy alternative. Enjoy :)    Kiwi Spirulina Smoothie      Makes 1 servings     1 cup unsweetened macadamia nut milk or filtered water   1 scoop mckeon vanilla plant based protein powder or 1-2 scoops collagen powder from vital proteins (plain)   1-2 Kiwi s peeled   1 handful cilantro   1 lime juiced    - 1 avocado   2 tsp spirulina   1 inch eduard fresh pilled      Method:     In  (recommend vitamix or blend tech) add dairy free alternative milk or filtered water. Add in the rest of ingredients blend on high for 2 mins. If desire thinner consistency add in the water or dairy alternative. Enjoy :)     Green Detox Smoothie      Makes 1 servings     1 cup unsweetened macadamia nut milk or filtered water (I like filtered water more for this recipe)  1-2 scoops collagen powder from vital proteins (plain)   1 small granny del castillo apple (prefer organic - lower pesticides and endocrine disruptors)   1 handful cilantro   1 lime  juiced    - 1 avocado (optional)   1 cucumber      Method:     In  (recommend vitamix or blend tech) add dairy free alternative milk or filtered water. Add in the rest of ingredients blend on high for 2 mins. If desire thinner consistency add in the water or dairy alternative. Enjoy :)        Spirulina Banana Smoothie      Makes 1 serving     1-2 cup unsweetened macadamia nut milk or filtered water (I like filtered water more for this recipe)  1 scoop mckeon vanilla plant based protein powder     banana   1 handful spinach    - 1 avocado  1-2 tsp spirulina    1 tbsp ground flax seed      Method:     In  (recommend vitamix or blend tech) add dairy free alternative milk or filtered water. Add in the rest of ingredients blend on high for 2 mins. If desire thinner consistency add in the water or dairy alternative. Enjoy :)     Berry Blast Smoothie      Makes 1 serving     1-2 cup unsweetened macadamia nut milk or filtered water (I like filtered water more for this recipe)  1 scoop mckeon vanilla plant based protein powder or collagen protein powder by vital proteins (plain)   1 cup mixed berries    1 handful spinach    - 1 avocado  1-2 tsp raw organic maqui berry powder by Sunfood superfoods (optional but very immune boosting and jammed with phytonutrients)   1 tbsp ground flax seed      Method:     In  (recommend vitamix or blend tech) add dairy free alternative milk or filtered water. Add in the rest of ingredients blend on high for 2 mins. If desire thinner consistency add in the water or dairy alternative. Enjoy :)     Superfood + Immune Boosting Powders:  Maqui Berry Powder   Turmeric Powder   Spirulina Powder   Glutamine Powder   Matcha Powder   Sammie fresh or powdered   Kale, dandelion greens or spinach         Omega 3 and Protein Desired Toppings:   Add some healthy protein and omega 3 packed seeds for an extra special nutrient packed topping and a little extra crunch!   1 tsp -1 tbps ground  flax seed   1 tsp -1 tbsp hemp seeds   1 tsp-1 tbsp juanita seeds      Mediterranean Approach: Eat whole, unprocessed real foods in their unprocessed forms such as fruits, vegetables, whole grains (prefer gluten free), nuts, legumes, extra virgin olive oil, spices, modest amounts of poultry, and fish.      Avoid inflammatory foods: Eliminate gluten found in wheat, barley, rye, oats, kamut, and spelt for at least 3 weeks to identify any hidden reaction. Gluten sensitivity or allergy can cause many different types of symptoms form migraines to fatigue to weight gain.  If symptoms go away this is a clue you may be reactive to gluten.  Dairy can also be inflammatory consider eliminating for 3 weeks as well. The proteins like casein and whey in dairy can irritate and inflame your gut. Also the sugar lactase in dairy can cause digestive issues in addition to blood sugar spikes.     Cardiometabolic Food plan - 1,800-2,200 calories per day     Protein 10-12 servings per day - include at each meal to stabilize blood sugars   (Choose 3oz or 21g per meal and aim for 1oz of 7g for snacks)   - Strive for 1-2 servings of fish per week especially of higher omega-3 fatty acid containing fish such as salmon.     Legumes <2 serving per day     Dairy alternatives 2-3 serving per day     Nuts and seeds 3-4 servings per day - great to incorporate as snacks    Fats and oils 4 servings per day     Non starchy vegetables 8-10 servings per day     Starchy vegetable limit 1 serving per day as they tend to impact blood sugar (they are moderate-GI).     Fruits 2 servings per day - best to couple with a little bit of protein or fat to offset a rise in blood sugars (they are low-moderate-GI foods).     Whole grains <2 serving per day - try gluten free whole grains instead        Incorporate protein powder daily:    Plant based hemp (recommended brands: [place], Nutiva, Just Hemp Protein, TouchOfModern Red Mill)      Plant based pea (recommended  brands: Naked Pea, Now Sports). If you want to try a combo of pea and hemp the brand Poe in vanilla or chocolate is a great option.     Try Bone broth protein powder or collagen peptides in liquid bone broth, vegetable broth or 12 oz of water as snack. The bone broth powder and collagen can be used for soups as well. This can help provide essential amino acids and minerals that heal your gut as well as balance blood sugars. A great option if you have a hard time tolerating solids.     Can also consider pre made shakes if unable to make smoothies.      Brand examples that are gluten and dairy free to try:   1) Orgain Vegan Protein Shakes, 20g of Plant Based Protein, Creamy Chocolate - Gluten Free, No Dairy, Soy, or Preservatives, No Added Sugar  2) Pirq, Vegan Protein Shake, Turmeric Curcumin, Huma, Plant-Based Protein Drink, Gluten-Free, Dairy-Free, Soy-Free, Non-GMO, Vegetarian, Kosher, Keto, Low Carb, Low Calorie  3) 1calendarYN Pro Elite Vegan Plant-Based High Protein Shake, 35g Protein, 9 Amino Acids, Omega-3, Prebiotics, Superfoods Greens for Workout and Recovery, 0g Net Carbs, Zero Sugar, Keto  4) Ripple Vegan Protein Shake  Chocolate  20g Nutritious Plant Based Pea Protein  Shelf Stable  No GMOs, Soy, Nut, Gluten, Lactose  5) Swapsee Glucose Support 1.2 Plant based, dairy free, low glycemic index  https://[employer]/products/glucose-support-1-2-vanilla    Choose Low Glycemic (GI) foods: Regulate your sugar levels by eating foods that do not spike blood sugars.  Eat low -GI foods so only small fluctuations in blood glucose and insulin levels are produced.      Examples of low-GI foods: nuts, seeds, GF oats, most vegetables especially non-starchy and fruits.     Medium or high-GI foods should be eaten with a protein or fat, both of which blunt the glycemic effect of these foods. This reduces the overall glycemic impact of a meal.   Ex: Most grains and starchy veggies are medium/high GI.     Avoid foods  containing refined sugars, artificial sweeteners, and refined grains they are considered high-GI because they lead to sharp increases in blood sugars levels, which increase insulin sensitivity causing increased TG, and low good cholesterol (HDL).   Ex: cakes, cookies, pies, bread, sodas, fruit drinks, presweetened tea, coffee drinks, energy or sport drinks, flavored milk and other processed foods.     Choose foods high in fiber: Aim for at least 5 grams of fiber per serving of food or a total of 25-35 grams fiber per day. Remember, when looking at the label, you can take the fiber away from the total carbs. Ex:15g of total carbs - 4g of fiber = 11g net carbs     Insoluble fiber acts like a bulky  inner broom,  sweeping out debris from the intestine and creating more motility and movement.      Soluble fiber attracts water and swells, creating a gel that slows digestion.  Also, slows the release of glucose from foods into the blood which stops spikes in blood sugar levels.  Soluble fiber traps toxins in the gut, helping to carry them to excretion and provides healthy bacteria in the digestive tract.     Choose High Quality Fats: Adding anti-inflammatory fats into your diet such as fish (salmon, herring, mackerel, and sardines), omega 3 eggs, juanita seeds, ground flax seeds/milk, hemp seeds/milk, walnuts and some other certain leafy greens will increase omega-3 fats to omeaga-6 fats ratio.     Therapeutic fats both monounsaturated and polyunsaturated to include daily: ground flaxseeds, unsalted mixed nuts, avocados, olives, extra-virgin olive oil.     Emphasize high-quality oils and fats in the diet daily such as avocado oil, coconut oil, flaxseed, olive, sesame. Ex: 1 tsp to 1 tbsp of MCT oil from coconuts can be added into coffee, smoothies, and salad dressings per day.     Avoid trans fats found in processed foods     Drink more water. Hydration is critical, so drink at least six to eight glasses of water a day.  Drink more water between meals and less at meals.     Try adding herbal teas (sugar free) or lemon/lime/cucumber/fruit to water for flavor. Avoid artificial sweetener packets to flavor your water.     Cut back on coffee switch to green tea. Avoid adding sugar and milk to coffee instead use dairy alternatives such as almond, flax, coconut milk.     Try another coffee substitute such as   -https://NaHere/  -https://[employer]    Focus on high quality micro-nutrients.     One Multivitamin by Pure encapsulations - 1 capsule per day with food     Nordic Naturals Plus vitamin D - 1-2 capsules daily with food    Rebuild the friendly bacteria in your microbime. Start by taking a probiotic supplement, they will help rebuild the healthy bacteria so essential to good gut health.     Recommend one of the following high quality probiotics:   1. BIOHM health (30 billion cfus per capsule) from Vaprema to help break down digestive plaque and get in amylase and variety of good bacteria.  Does not need to be refrigerated great for traveling. Take 1 capsule daily anytime with our without food   2. VISBIOME (112.5 billion bacteria per capsule needs to be refrigerated).  Take 1 capsule daily anytime with our without food.     In addition you can help establish healthy gut microflora by consuming foods that contain live active cultures ( probiotics ) such as kimchi,     Increase physical activity. Moving our body helps move our bowels and speeds up your metabolism.     Exercise 15-60 minutes daily--whether that looks like burst training, yoga, or vigorous walking.    Manage your stress. Stress can negatively impact the way you digest foods and absorb nutrients leading to more digestive issues (constipation, diarrhea, indigestion, nausea etc), imbalanced blood sugars and weight imbalances. Try to focus on the following relaxation techniques:     Regular exercise such as walking     Yoga    Meditation     Breathing  techniques     Time management     Work on mindset shifts to help with motivation - check out the daily affirmations abel and motivation abel to provide inspiration and accountability on your phone. The following are two really good applications to add to phone:      1. I am - Daily Affirmations   Manifest motivation reminders by Monkey Taps     2.   Motivation - Daily quotes  Reminders to think positive by Monkey Taps     Look into dailygreatness.co [employer] wellness journal.   [facility] includes a daily food and exercise journal, weekly and quarterly goal planners, training and clean eating tips, weekly shopping lists, healthy habit reminders & mindset coaching.     If you're looking for a journal to help you focus on holistic health, wellness, and vitality, this wellness journal is for you.    Track what you eat. Writing down or tracking through an abel what you eat as well as how you feel acn help you identify patterns in your symptoms. This can help you become more aware and creat a diet that is right for you.     Pick a food tracking abel:     Through the mysymptoms abel, you can track symptoms, bowl movements, medications, stress, exercise, sleep and foods as well as beverages to become more mindful. [email or web address]      Through the myfitnesspal abel, you can focus more on calories and macronutrient's of foods to balance blood sugars or monitor weight. You can track blood sugars in the notes section along with symptoms, bowl movements, medications, stress, exercise, water intake and sleep.   Note: You don't have to journal forever and it is more important that you have consistent meals as well as snacks while focusing on adding in healthy foods.    NUTRITION RESOURCES:  1. Purchase Eat Fat Get Thin by Manolo Rosenberg Book/Cookbook   2. IFM Cardiometabolic Packet     Functional Nutrition Fundamentals     Comprehensive Guide    Meal plan with recipes             PATIENT'S BEHAVIOR CHANGE GOALS:    See nutrition intervention for patient stated behavior change goals. AVS was printed and given to patient at today's appointment.    MONITOR / EVALUATE:  Registered Dietitian will monitor/evaluate the following:     Beliefs and attitudes related to food    Food and nutrition knowledge / skills    Food / Beverage / Nutrient intake     Pertinent Labs    Progress toward meeting stated nutrition-related goals    Readiness to change nutrition-related behaviors    Weight change    Digestion     COORDINATION OF CARE:  Follow up with primary care provider       FOLLOW-UP:  Follow-up appointment scheduled on Dec 28th at 11am video visit    Time spent in minutes: 60 minutes 4 units   Encounter: Individual    Rand Regalado RD, CLT, LD  Integrative Registered Dietitian       Again, thank you for allowing me to participate in the care of your patient.        Sincerely,        Rand Regalado RD

## 2022-11-08 NOTE — LETTER
November 9, 2022      Barbara Yates  [address]  [facility] [address]        Dear MsJessicaMilan,    We are writing to inform you of your test results.    {results letter list:[id number]}    No results found from the In Basket message.    If you have any questions or concerns, please call the clinic at the number listed above.       Sincerely,

## 2022-11-09 NOTE — PATIENT INSTRUCTIONS
NUTRITION INTERVENTION:         Long Term Goals:   Goal: Lipid profile; Increase HDL > 49mg/dL within 6 months   Goal: Increase healthy relationship with food   Goal: Lose 20-24lbs in 6 months, recommend average 1-2lbs per week of weight loss.           Short Term Goals:  Goal 1: Focus on smoothie for breakfast, ---add in unsweetened flax, hemp or coconut milk, 1 tbsp of healthy fat such as flax seed ground or juanita seed ground. serving of fruit 1 cup of berries. Veggie (optional), 1 scoop of plant based protein powder or collagen powder, 1 serving of fruit and veggie such as kale, spinach, or veggie powder even spirulina - see recipes below or details)      Also, check out some of the other breakfast ideas provided such as oatmeal (naturally gluten free) with dairy free alternative milk, flax seed, nuts such as walnuts, fruit such as berries and protein like eggs or chicken/turkey sausage.      Goal 2:  Make sure to eat consistent meals daily with focus on balanced lunch and at least 1-2 snacks daily around 10am and 3pm.  Eating every three to four hours will help keep your insulin and blood sugar normal to help with weight loss  - see meal plan and recipe ideas in the cardi metabolic guides provided.     2 hard boiled eggs.   Greek Yogurt with Blackberries  Fresh Yellow Pear or veggie (radishes, bell peppers, baby cucumbers, carrots or sugar snap peas) with Hummus - try the mini to go packs to help with portion control   Marinated Olives* and Kefir   Purple Plum with Mixed Nuts  Celery with [place] Butter  Dark Chocolate, 70% or higher Cocoa and Pistachio Nuts  Banana with almond butter or sunflower seed butter   Apple with peanut butter topped with cinnamon   Baby cucumber or carrots, bell pepper, radishes or sugar snap peas with individual servings of guac   Low sodium cottage with peaches or mandarin oranges   Sunflower seeds with strawberries   Turkey jerky or 100% grass fed beef jerky   Plant based protein bar    Simple Mills Crawford Flour Crackers or Shirlene Gone Cracker dip with hummus or guac   Avocado on slice of gluten free bread recommend base culture brand or 100% whole grain bread    Goal 4: Monitor portion sizes   - consider just having 1 serving of grains per day and swap for lower carb/sugar options such as non-starchy veggies. Read labels for low sugar and carbs.    - consider having alcohol in moderation and or avoiding. Alcohol can increase triglycerides and fat in the liver and create blood sugar imbalances leading to weight gain.      Goal 5: Recommend the following supplements to start   One Multivitamin by Pure encapsulations - 1 capsule per day with food   Nordic Naturals Plus vitamin D - 1-2 capsules daily with food ----[email or web address]     Look on amazon to find the supplement recommendations or talk with the [facility] to special order. They may be able to offer a discount as well.        Smoothie Recipes to Kickoff Your Success!!     Pineapple Coconut Smoothie      Makes 1-2 serving     1-2 cup unsweetened coconut milk   1 scoop mckeon vanilla plant based protein powder or collagen protein powder by vital proteins (great for just having no flavor - plain)   1 cup frozen pineapple chunks   1 handful spinach (optional)     banana (optional - if added use less pineapple so not as high of sugar content)   1 tbsp shredded unsweetend coconut to top (optional)  1 tbsp ground flax seed (optional)     Method:     In  (recommend vitamix or blend tech) add dairy free alternative milk or filtered water. Add in the rest of ingredients blend on high for 2 mins. If desire thinner consistency add in the water or dairy alternative. Enjoy :)    Kiwi Spirulina Smoothie      Makes 1 servings     1 cup unsweetened macadamia nut milk or filtered water   1 scoop mckeon vanilla plant based protein powder or 1-2 scoops collagen powder from vital proteins (plain)    1-2 Kiwi s peeled   1 handful cilantro   1 lime juiced    - 1 avocado   2 tsp spirulina   1 inch eduard fresh pilled      Method:     In  (recommend vitamix or blend tech) add dairy free alternative milk or filtered water. Add in the rest of ingredients blend on high for 2 mins. If desire thinner consistency add in the water or dairy alternative. Enjoy :)     Green Detox Smoothie      Makes 1 servings     1 cup unsweetened macadamia nut milk or filtered water (I like filtered water more for this recipe)  1-2 scoops collagen powder from vital proteins (plain)   1 small granny [place] apple (prefer organic - lower pesticides and endocrine disruptors)   1 handful cilantro   1 lime juiced    - 1 avocado (optional)   1 cucumber      Method:     In  (recommend vitamix or blend tech) add dairy free alternative milk or filtered water. Add in the rest of ingredients blend on high for 2 mins. If desire thinner consistency add in the water or dairy alternative. Enjoy :)        Spirulina Banana Smoothie      Makes 1 serving     1-2 cup unsweetened macadamia nut milk or filtered water (I like filtered water more for this recipe)  1 scoop mckeon vanilla plant based protein powder     banana   1 handful spinach    - 1 avocado  1-2 tsp spirulina    1 tbsp ground flax seed      Method:     In  (recommend vitamix or blend tech) add dairy free alternative milk or filtered water. Add in the rest of ingredients blend on high for 2 mins. If desire thinner consistency add in the water or dairy alternative. Enjoy :)     Berry Blast Smoothie      Makes 1 serving     1-2 cup unsweetened macadamia nut milk or filtered water (I like filtered water more for this recipe)  1 scoop mckeon vanilla plant based protein powder or collagen protein powder by vital proteins (plain)   1 cup mixed berries    1 handful spinach    - 1 avocado  1-2 tsp raw organic maqui berry powder by Sunfood superfoods (optional but very immune boosting and  jammed with phytonutrients)   1 tbsp ground flax seed      Method:     In  (recommend vitamix or blend tech) add dairy free alternative milk or filtered water. Add in the rest of ingredients blend on high for 2 mins. If desire thinner consistency add in the water or dairy alternative. Enjoy :)     Superfood + Immune Boosting Powders:  Maqui Berry Powder   Turmeric Powder   Spirulina Powder   Glutamine Powder   Matcha Powder   Sammie fresh or powdered   Kale, dandelion greens or spinach         Omega 3 and Protein Desired Toppings:   Add some healthy protein and omega 3 packed seeds for an extra special nutrient packed topping and a little extra crunch!   1 tsp -1 tbps ground flax seed   1 tsp -1 tbsp hemp seeds   1 tsp-1 tbsp juanita seeds      Mediterranean Approach: Eat whole, unprocessed real foods in their unprocessed forms such as fruits, vegetables, whole grains (prefer gluten free), nuts, legumes, extra virgin olive oil, spices, modest amounts of poultry, and fish.      Avoid inflammatory foods: Eliminate gluten found in wheat, barley, rye, oats, kamut, and spelt for at least 3 weeks to identify any hidden reaction. Gluten sensitivity or allergy can cause many different types of symptoms form migraines to fatigue to weight gain.  If symptoms go away this is a clue you may be reactive to gluten.  Dairy can also be inflammatory consider eliminating for 3 weeks as well. The proteins like casein and whey in dairy can irritate and inflame your gut. Also the sugar lactase in dairy can cause digestive issues in addition to blood sugar spikes.     Cardiometabolic Food plan - 1,800-2,200 calories per day   Protein 10-12 servings per day - include at each meal to stabilize blood sugars   (Choose 3oz or 21g per meal and aim for 1oz of 7g for snacks)   Strive for 1-2 servings of fish per week especially of higher omega-3 fatty acid containing fish such as salmon.   Legumes <2 serving per day   Dairy alternatives 2-3  serving per day   Nuts and seeds 3-4 servings per day - great to incorporate as snacks  Fats and oils 4 servings per day   Non starchy vegetables 8-10 servings per day   Starchy vegetable limit 1 serving per day as they tend to impact blood sugar (they are moderate-GI).   Fruits 2 servings per day - best to couple with a little bit of protein or fat to offset a rise in blood sugars (they are low-moderate-GI foods).   Whole grains <2 serving per day - try gluten free whole grains instead        Incorporate protein powder daily:  Plant based hemp (recommended brands: [place] Baker, Nutiva, Just Hemp Protein, Xavi's Red Mill)    Plant based pea (recommended brands: Naked Pea, Now Sports). If you want to try a combo of pea and hemp the brand Poe in vanilla or chocolate is a great option.   Try Bone broth protein powder or collagen peptides in liquid bone broth, vegetable broth or 12 oz of water as snack. The bone broth powder and collagen can be used for soups as well. This can help provide essential amino acids and minerals that heal your gut as well as balance blood sugars. A great option if you have a hard time tolerating solids.     Can also consider pre made shakes if unable to make smoothies.      Brand examples that are gluten and dairy free to try:   1) Orgain Vegan Protein Shakes, 20g of Plant Based Protein, Creamy Chocolate - Gluten Free, No Dairy, Soy, or Preservatives, No Added Sugar  2) Pirq, Vegan Protein Shake, Turmeric Curcumin, Huma, Plant-Based Protein Drink, Gluten-Free, Dairy-Free, Soy-Free, Non-GMO, Vegetarian, Kosher, Keto, Low Carb, Low Calorie  3) OWYN Pro Elite Vegan Plant-Based High Protein Shake, 35g Protein, 9 Amino Acids, Omega-3, Prebiotics, Superfoods Greens for Workout and Recovery, 0g Net Carbs, Zero Sugar, Keto  4) Ripple Vegan Protein Shake  Chocolate  20g Nutritious Plant Based Pea Protein  Shelf Stable  No GMOs, Soy, Nut, Gluten, Lactose  5) Sookbox Glucose Support 1.2  Plant based, dairy free, low glycemic index  [email or web address]-vanilla    Choose Low Glycemic (GI) foods: Regulate your sugar levels by eating foods that do not spike blood sugars.  Eat low -GI foods so only small fluctuations in blood glucose and insulin levels are produced.      Examples of low-GI foods: nuts, seeds, GF oats, most vegetables especially non-starchy and fruits.     Medium or high-GI foods should be eaten with a protein or fat, both of which blunt the glycemic effect of these foods. This reduces the overall glycemic impact of a meal.   Ex: Most grains and starchy veggies are medium/high GI.     Avoid foods containing refined sugars, artificial sweeteners, and refined grains they are considered high-GI because they lead to sharp increases in blood sugars levels, which increase insulin sensitivity causing increased TG, and low good cholesterol (HDL).   Ex: cakes, cookies, pies, bread, sodas, fruit drinks, presweetened tea, coffee drinks, energy or sport drinks, flavored milk and other processed foods.     Choose foods high in fiber: Aim for at least 5 grams of fiber per serving of food or a total of 25-35 grams fiber per day. Remember, when looking at the label, you can take the fiber away from the total carbs. Ex:15g of total carbs - 4g of fiber = 11g net carbs     Insoluble fiber acts like a bulky  inner broom,  sweeping out debris from the intestine and creating more motility and movement.      Soluble fiber attracts water and swells, creating a gel that slows digestion.  Also, slows the release of glucose from foods into the blood which stops spikes in blood sugar levels.  Soluble fiber traps toxins in the gut, helping to carry them to excretion and provides healthy bacteria in the digestive tract.     Choose High Quality Fats: Adding anti-inflammatory fats into your diet such as fish (salmon, herring, mackerel, and sardines), omega 3 eggs, juanita seeds, ground  flax seeds/milk, hemp seeds/milk, walnuts and some other certain leafy greens will increase omega-3 fats to omeaga-6 fats ratio.     Therapeutic fats both monounsaturated and polyunsaturated to include daily: ground flaxseeds, unsalted mixed nuts, avocados, olives, extra-virgin olive oil.     Emphasize high-quality oils and fats in the diet daily such as avocado oil, coconut oil, flaxseed, olive, sesame. Ex: 1 tsp to 1 tbsp of MCT oil from coconuts can be added into coffee, smoothies, and salad dressings per day.   Avoid trans fats found in processed foods     Drink more water. Hydration is critical, so drink at least six to eight glasses of water a day. Drink more water between meals and less at meals.   Try adding herbal teas (sugar free) or lemon/lime/cucumber/fruit to water for flavor. Avoid artificial sweetener packets to flavor your water.   Cut back on coffee switch to green tea. Avoid adding sugar and milk to coffee instead use dairy alternatives such as almond, flax, coconut milk.   Try another coffee substitute such as   -[email or web address]  -[email or web address]    Focus on high quality micro-nutrients.   One Multivitamin by Pure encapsulations - 1 capsule per day with food   Nordic Naturals Plus vitamin D - 1-2 capsules daily with food    Rebuild the friendly bacteria in your microbime. Start by taking a probiotic supplement, they will help rebuild the healthy bacteria so essential to good gut health.   Recommend one of the following high quality probiotics:   BIOInitMe health (30 billion cfus per capsule) from [employer] to help break down digestive plaque and get in amylase and variety of good bacteria.  Does not need to be refrigerated great for traveling. Take 1 capsule daily anytime with our without food   2. VISBIOME (112.5 billion bacteria per capsule needs to be refrigerated).  Take 1 capsule daily anytime with our without food.   In addition you can help establish healthy gut microflora  by consuming foods that contain live active cultures ( probiotics ) such as kimchi,     Increase physical activity. Moving our body helps move our bowels and speeds up your metabolism.   Exercise 15-60 minutes daily--whether that looks like burst training, yoga, or vigorous walking.    Manage your stress. Stress can negatively impact the way you digest foods and absorb nutrients leading to more digestive issues (constipation, diarrhea, indigestion, nausea etc), imbalanced blood sugars and weight imbalances. Try to focus on the following relaxation techniques:   Regular exercise such as walking   Yoga  Meditation   Breathing techniques   Time management     Work on mindset shifts to help with motivation - check out the daily affirmations abel and motivation abel to provide inspiration and accountability on your phone. The following are two really good applications to add to phone:      I am - Daily Affirmations   Manifest motivation reminders by Monkey Taps     2.   Motivation - Daily quotes  Reminders to think positive by Monkey Taps     Look into [email or web address] [employer] wellness journal.   [facility] includes a daily food and exercise journal, weekly and quarterly goal planners, training and clean eating tips, weekly shopping lists, healthy habit reminders & mindset coaching.     If you're looking for a journal to help you focus on holistic health, wellness, and vitality, this wellness journal is for you.    Track what you eat. Writing down or tracking through an abel what you eat as well as how you feel acn help you identify patterns in your symptoms. This can help you become more aware and creat a diet that is right for you.     Pick a food tracking abel:   Through the mysymptoms abel, you can track symptoms, bowl movements, medications, stress, exercise, sleep and foods as well as beverages to become more mindful. [email or web address]    Through the myfitnesspal abel, you can focus more on  calories and macronutrient's of foods to balance blood sugars or monitor weight. You can track blood sugars in the notes section along with symptoms, bowl movements, medications, stress, exercise, water intake and sleep.   Note: You don't have to journal forever and it is more important that you have consistent meals as well as snacks while focusing on adding in healthy foods.    NUTRITION RESOURCES:  Purchase Eat Fat Get Thin by Manolo Rosenberg Book/Cookbook   IFM Cardiometabolic Packet   Functional Nutrition Fundamentals   Comprehensive Guide  Meal plan with recipes

## 2022-11-14 DIAGNOSIS — F41.1 GENERALIZED ANXIETY DISORDER: ICD-10-CM

## 2022-11-15 RX ORDER — BUSPIRONE HYDROCHLORIDE 15 MG/1
TABLET ORAL
Qty: 180 TABLET | Refills: 3 | Status: SHIPPED | OUTPATIENT
Start: 2022-11-15 | End: 2023-10-16

## 2022-12-28 ENCOUNTER — VIRTUAL VISIT (OUTPATIENT)
Dept: NUTRITION | Facility: CLINIC | Age: 42
End: 2022-12-28
Payer: COMMERCIAL

## 2022-12-28 DIAGNOSIS — R63.5 WEIGHT GAIN: Primary | ICD-10-CM

## 2022-12-28 PROCEDURE — 97803 MED NUTRITION INDIV SUBSEQ: CPT | Mod: 95 | Performed by: DIETITIAN, REGISTERED

## 2022-12-28 NOTE — PATIENT INSTRUCTIONS
NUTRITION INTERVENTION:   Long Term Goals:   Goal: Lipid profile; Increase HDL > 49mg/dL within 6 months   Goal: Increase healthy relationship with food   Goal: Lose 20-24lbs in 6 months, recommend average 1-2lbs per week of weight loss.           Short Term Goals:  Goal 1:Improvement Shown: Focus on smoothie for breakfast, ---add in unsweetened flax, hemp or coconut milk, 1 tbsp of healthy fat such as flax seed ground or juanita seed ground. serving of fruit 1 cup of berries. Veggie (optional), 1 scoop of plant based protein powder or collagen powder, 1 serving of fruit and veggie such as kale, spinach, or veggie powder even spirulina - see recipes below or details)      Also, check out some of the other breakfast ideas provided such as oatmeal (naturally gluten free) with dairy free alternative milk, flax seed, nuts such as walnuts, fruit such as berries and protein like eggs or chicken/turkey sausage.      Goal 2: Needs Improvement:  Make sure to eat consistent meals daily with focus on balanced lunch and at least 1-2 snacks daily around 10am and 3pm. Set some reminders on calender to help follow through.   Eating every three to four hours will help keep your insulin and blood sugar normal to help with weight loss  - see meal plan and recipe ideas in the cardi metabolic guides provided.     2 hard boiled eggs.   Greek Yogurt with Blackberries  Fresh Yellow Pear or veggie (radishes, bell peppers, baby cucumbers, carrots or sugar snap peas) with Hummus - try the mini to go packs to help with portion control   Marinated Olives* and Kefir   Purple Plum with Mixed Nuts  Celery with [place] Butter  Dark Chocolate, 70% or higher Cocoa and Pistachio Nuts  Banana with almond butter or sunflower seed butter   Apple with peanut butter topped with cinnamon   Baby cucumber or carrots, bell pepper, radishes or sugar snap peas with individual servings of guac   Low sodium cottage with peaches or mandarin oranges   Sunflower seeds  with strawberries   Turkey jerky or 100% grass fed beef jerky   Plant based protein bar   Simple Mills Gila Flour Crackers or Shirelne Gone Cracker dip with hummus or guac   Avocado on slice of gluten free bread recommend base culture brand or 100% whole grain bread    Goal 3: Needs Improvement: Monitor portion sizes specifically carbs/sugar and reading labels   - consider just having 1 serving of grains per day and swap for lower carb/sugar options such as non-starchy veggies. Read labels for low sugar and carbs.    - consider having alcohol in moderation and or avoiding. Alcohol can increase triglycerides and fat in the liver and create blood sugar imbalances leading to weight gain.      Goal 4: Improvement Shown: Recommend the following supplements to start   One Multivitamin by Pure encapsulations - 1 capsule per day with food   Nordic Naturals Plus vitamin D - 1-2 capsules daily with food ----https://[email or web address]/consumers/ultimate-omega-[email or web address]     Look on amazon to find the supplement recommendations or talk with the [facility] to special order. They may be able to offer a discount as well.     Goal 5: Focus on having some quick healthy lunches - ex chicken salad or tuna salad and set some reminders in calender to help prep and eat throughout.     Goal 6: Work on mindset shifts to help with motivation - check out the daily affirmations abel and motivation abel to provide inspiration and accountability on your phone. The following are two really good applications to add to phone:      I am - Daily Affirmations   Manifest motivation reminders by Monkey Taps     2.   Motivation - Daily quotes  Reminders to think positive by Monkey Taps     Look into [email or web address] green wellness journal.   Dailygreatness Wellness includes a daily food and exercise journal, weekly and quarterly goal planners, training and clean eating tips, weekly shopping lists, healthy habit reminders & mindset  coaching.     If you're looking for a journal to help you focus on holistic health, wellness, and vitality, this wellness journal is for you.  https://Comixology/products/IP Ghoster-wellness-journal-a-holistic-guide-for-health-wellness-vitality     Smoothie Recipes to Kickoff Your Success!!     Pineapple Coconut Smoothie      Makes 1-2 serving     1-2 cup unsweetened coconut milk   1 scoop mckeon vanilla plant based protein powder or collagen protein powder by vital proteins (great for just having no flavor - plain)   1 cup frozen pineapple chunks   1 handful spinach (optional)     banana (optional - if added use less pineapple so not as high of sugar content)   1 tbsp shredded unsweetend coconut to top (optional)  1 tbsp ground flax seed (optional)     Method:     In  (recommend vitamix or blend tech) add dairy free alternative milk or filtered water. Add in the rest of ingredients blend on high for 2 mins. If desire thinner consistency add in the water or dairy alternative. Enjoy :)    Kiwi Spirulina Smoothie      Makes 1 servings     1 cup unsweetened macadamia nut milk or filtered water   1 scoop mckeon vanilla plant based protein powder or 1-2 scoops collagen powder from vital proteins (plain)   1-2 Kiwi s peeled   1 handful cilantro   1 lime juiced    - 1 avocado   2 tsp spirulina   1 inch eduard fresh pilled      Method:     In  (recommend vitamix or blend tech) add dairy free alternative milk or filtered water. Add in the rest of ingredients blend on high for 2 mins. If desire thinner consistency add in the water or dairy alternative. Enjoy :)     Green Detox Smoothie      Makes 1 servings     1 cup unsweetened macadamia nut milk or filtered water (I like filtered water more for this recipe)  1-2 scoops collagen powder from vital proteins (plain)   1 small gr[place] apple (prefer organic - lower pesticides and endocrine disruptors)   1 handful cilantro   1 lime juiced    - 1 avocado  (optional)   1 cucumber      Method:     In  (recommend vitamix or blend tech) add dairy free alternative milk or filtered water. Add in the rest of ingredients blend on high for 2 mins. If desire thinner consistency add in the water or dairy alternative. Enjoy :)        Spirulina Banana Smoothie      Makes 1 serving     1-2 cup unsweetened macadamia nut milk or filtered water (I like filtered water more for this recipe)  1 scoop mckeon vanilla plant based protein powder     banana   1 handful spinach    - 1 avocado  1-2 tsp spirulina    1 tbsp ground flax seed      Method:     In  (recommend vitamix or blend tech) add dairy free alternative milk or filtered water. Add in the rest of ingredients blend on high for 2 mins. If desire thinner consistency add in the water or dairy alternative. Enjoy :)     Berry Blast Smoothie      Makes 1 serving     1-2 cup unsweetened macadamia nut milk or filtered water (I like filtered water more for this recipe)  1 scoop mckeon vanilla plant based protein powder or collagen protein powder by vital proteins (plain)   1 cup mixed berries    1 handful spinach    - 1 avocado  1-2 tsp raw organic maqui berry powder by Sunfood superfoods (optional but very immune boosting and jammed with phytonutrients)   1 tbsp ground flax seed      Method:     In  (recommend vitamix or blend tech) add dairy free alternative milk or filtered water. Add in the rest of ingredients blend on high for 2 mins. If desire thinner consistency add in the water or dairy alternative. Enjoy :)     Superfood + Immune Boosting Powders:  Maqui Berry Powder   Turmeric Powder   Spirulina Powder   Glutamine Powder   Matcha Powder   Sammie fresh or powdered   Kale, dandelion greens or spinach         Omega 3 and Protein Desired Toppings:   Add some healthy protein and omega 3 packed seeds for an extra special nutrient packed topping and a little extra crunch!   1 tsp -1 tbps ground flax seed   1 tsp -1  tbsp hemp seeds   1 tsp-1 tbsp juanita seeds      Mediterranean Approach: Eat whole, unprocessed real foods in their unprocessed forms such as fruits, vegetables, whole grains (prefer gluten free), nuts, legumes, extra virgin olive oil, spices, modest amounts of poultry, and fish.      Avoid inflammatory foods: Eliminate gluten found in wheat, barley, rye, oats, kamut, and spelt for at least 3 weeks to identify any hidden reaction. Gluten sensitivity or allergy can cause many different types of symptoms form migraines to fatigue to weight gain.  If symptoms go away this is a clue you may be reactive to gluten.  Dairy can also be inflammatory consider eliminating for 3 weeks as well. The proteins like casein and whey in dairy can irritate and inflame your gut. Also the sugar lactase in dairy can cause digestive issues in addition to blood sugar spikes.     Cardiometabolic Food plan - 1,800-2,200 calories per day   Protein 10-12 servings per day - include at each meal to stabilize blood sugars   (Choose 3oz or 21g per meal and aim for 1oz of 7g for snacks)   Strive for 1-2 servings of fish per week especially of higher omega-3 fatty acid containing fish such as salmon.   Legumes <2 serving per day   Dairy alternatives 2-3 serving per day   Nuts and seeds 3-4 servings per day - great to incorporate as snacks  Fats and oils 4 servings per day   Non starchy vegetables 8-10 servings per day   Starchy vegetable limit 1 serving per day as they tend to impact blood sugar (they are moderate-GI).   Fruits 2 servings per day - best to couple with a little bit of protein or fat to offset a rise in blood sugars (they are low-moderate-GI foods).   Whole grains <2 serving per day - try gluten free whole grains instead        Incorporate protein powder daily:  Plant based hemp (recommended brands: [place], Nutiva, Just Hemp Protein, [employer] [place])    Plant based pea (recommended brands: Naked Pea, Now Sports). If you want to  try a combo of pea and hemp the brand Poe in vanilla or chocolate is a great option.   Try Bone broth protein powder or collagen peptides in liquid bone broth, vegetable broth or 12 oz of water as snack. The bone broth powder and collagen can be used for soups as well. This can help provide essential amino acids and minerals that heal your gut as well as balance blood sugars. A great option if you have a hard time tolerating solids.     Can also consider pre made shakes if unable to make smoothies.      Brand examples that are gluten and dairy free to try:   1) Orgain Vegan Protein Shakes, 20g of Plant Based Protein, Creamy Chocolate - Gluten Free, No Dairy, Soy, or Preservatives, No Added Sugar  2) Pirq, Vegan Protein Shake, Turmeric Curcumin, Huma, Plant-Based Protein Drink, Gluten-Free, Dairy-Free, Soy-Free, Non-GMO, Vegetarian, Kosher, Keto, Low Carb, Low Calorie  3) OWYN Pro Elite Vegan Plant-Based High Protein Shake, 35g Protein, 9 Amino Acids, Omega-3, Prebiotics, Superfoods Greens for Workout and Recovery, 0g Net Carbs, Zero Sugar, Keto  4) Ripple Vegan Protein Shake  Chocolate  20g Nutritious Plant Based Pea Protein  Shelf Stable  No GMOs, Soy, Nut, Gluten, Lactose  5) TRADE TO REBATE Glucose Support 1.2 Plant based, dairy free, low glycemic index  https://Social Fabrics.Xtium/products/glucose-support-1-2-vanilla    Choose Low Glycemic (GI) foods: Regulate your sugar levels by eating foods that do not spike blood sugars.  Eat low -GI foods so only small fluctuations in blood glucose and insulin levels are produced.      Examples of low-GI foods: nuts, seeds, GF oats, most vegetables especially non-starchy and fruits.     Medium or high-GI foods should be eaten with a protein or fat, both of which blunt the glycemic effect of these foods. This reduces the overall glycemic impact of a meal.   Ex: Most grains and starchy veggies are medium/high GI.     Avoid foods containing refined sugars, artificial  sweeteners, and refined grains they are considered high-GI because they lead to sharp increases in blood sugars levels, which increase insulin sensitivity causing increased TG, and low good cholesterol (HDL).   Ex: cakes, cookies, pies, bread, sodas, fruit drinks, presweetened tea, coffee drinks, energy or sport drinks, flavored milk and other processed foods.     Choose foods high in fiber: Aim for at least 5 grams of fiber per serving of food or a total of 25-35 grams fiber per day. Remember, when looking at the label, you can take the fiber away from the total carbs. Ex:15g of total carbs - 4g of fiber = 11g net carbs     Insoluble fiber acts like a bulky  inner broom,  sweeping out debris from the intestine and creating more motility and movement.      Soluble fiber attracts water and swells, creating a gel that slows digestion.  Also, slows the release of glucose from foods into the blood which stops spikes in blood sugar levels.  Soluble fiber traps toxins in the gut, helping to carry them to excretion and provides healthy bacteria in the digestive tract.     Choose High Quality Fats: Adding anti-inflammatory fats into your diet such as fish (salmon, herring, mackerel, and sardines), omega 3 eggs, juanita seeds, ground flax seeds/milk, hemp seeds/milk, walnuts and some other certain leafy greens will increase omega-3 fats to omeaga-6 fats ratio.     Therapeutic fats both monounsaturated and polyunsaturated to include daily: ground flaxseeds, unsalted mixed nuts, avocados, olives, extra-virgin olive oil.     Emphasize high-quality oils and fats in the diet daily such as avocado oil, coconut oil, flaxseed, olive, sesame. Ex: 1 tsp to 1 tbsp of MCT oil from coconuts can be added into coffee, smoothies, and salad dressings per day.   Avoid trans fats found in processed foods     Drink more water. Hydration is critical, so drink at least six to eight glasses of water a day. Drink more water between meals and less at  meals.   Try adding herbal teas (sugar free) or lemon/lime/cucumber/fruit to water for flavor. Avoid artificial sweetener packets to flavor your water.   Cut back on coffee switch to green tea. Avoid adding sugar and milk to coffee instead use dairy alternatives such as almond, flax, coconut milk.   Try another coffee substitute such as   -https://BioData/  -https://Xylo.[employer]          NUTRITION RESOURCES:  Purchase Eat Fat Get Thin by Manolo Rosenberg Book/Cookbook   IFM Cardiometabolic Packet   Functional Nutrition Fundamentals   Comprehensive Guide  Meal plan with recipes

## 2022-12-28 NOTE — PROGRESS NOTES
Medical Nutrition Therapy  Visit Type: Reassessment and intervention    Visit Details    How would you like to obtain your AVS? MyChart  If the correspondence for visit is dropped, how would you like your dietitian to reconnect with you:   call back by phone? Yes    Text to cell phone: [phone]  Will anyone else be joining your video visit or telephone call? No  {If patient encounters technical issues they should call [phone] :08    Type of service:  Video Visit     Start Time: 11:01 AM    End Time: 11:38 AM    Originating Location (pt. Location): Home    Distant Location (provider location):  [facility] [place] WORKING VIRTUAL FROM HOME     Platform used for Video Visit: Macie      Referring Provider: Nicole Soto  [facility]     REASON FOR REFERRAL:   Barbara Yates is a 42 year old female who is interested in Medical Nutrition Therapy (MNT) and education related to weight management.   She is accompanied by self.    Changes since previous consult Yes        Behavior Status:Improvement shown  Barriers Include:lack of consistency and motivation, planning     Goals: What would you like to work on that will help you most over the next several weeks? Meal planning and prep for snacks and lunches     She got the protein in the morning as recommended and even tried the bullet proof diet. She has been trying to navigate having lower amounts of sugar. She got some MCT oil and coconut creamer has sugar too and collagen peptides which she has been putting coffee. She is doing this about 5x per week. She hasn't much of a difference, she has to keep the MCT oil at tsp.She also adds about 1 tbsp of sugar. Wants to be more mindful of cutting back on sugar intake. She started the multivitamin but fell off. The holidays have made it harder to be consistent and some days are harder than others because of the weather. It really resonated with her to set a reminder for snacks and  lunch. Dicussed putting foods into a bowl vs eating a whole bag of chips. She did get a chance to print the resources provided but didn't get a chance read through and wants to look back on recipes to see what to have for more variety. We discussed planning out some days to go grocery shopping and prep so that when the lunch alarm goes off she has something ready. She will often get lost in the day working from home and forget to eat until really hungry at 2pm. She started tracking in [facility] and fell off.        NUTRITION ASSESSMENT:   How to incorporate more protein. She is trying to get more nutrients from the food that she eats instead of having to just take supplements. She tried taurus and more keto based. She tried the meal replacements. She got the keto flu and don't having naturals sugars and more artificial sugars didn't fit well. Her background is  and its harder to go lower carb and cant cut certain things out of her diet. She doesn't know how to read labels. Just doesn't know and needs to find the balance. Less likely to fall of the strict and ridged plan. Worry about the holidays coming up with baking this and that. Needs to look into the moderation and what exactly and understand nutrition better. Does she need to be concerned about macros vs calories and all these different things ways of foods, how to read them and what is better for weight loss. Toss out all the things you can't have an diets start focusing on what you can have. No subscription, strict foods you have to use. More flexibility an recipes to try out and tweak to her own liking. Got sick after cutting everything cold turkey. She shocked her system and got shocked. Quitting the every day foods used to and going to something else didn't work. Being more aware of foods now and pay attention to the label and macro or carb counts ex for oat milk.     You know for her I feel like getting down to 120lbs would be easier for her. She  isn't sure of her current weight but constantly having low energy and never used to have that before. She has a lot of joint problems. Her stamina just isn't the same. She just wants to exercise and eat healthier and see where it takes her. Having a more healthy lifestyle. She is borderline diabetic.     Has coffee for breakfast skipping food usually or goes out every other day to so something quick and easy will do caribou mocha with oatmilk or milk cinnamon     First meal around lunch hour 11am - usually has salad with a protein in the office and if crunched on time has ramen noodles with egg if she gets a chance to add.     Sometimes does carrots with dressing as snack    Dinner depends she will get a salad or soup from chick faley or sweets or fried egg with rice or stir ramírez with rice or chili just depends on the eveing more often home cooked meals than running out and getting something    Neurological 11/8/2022   Tension Headache Current       No flowsheet data found.   No flowsheet data found.   No flowsheet data found.    No flowsheet data found.   No flowsheet data found.   Skin 11/8/2022   Acne Current   Dry Skin Current      No flowsheet data found.   No flowsheet data found.   Psychological 11/8/2022   Depression/Anxiety Past;Current      Genitourinary 11/8/2022   PMS Past;Current      Womens Health Assessment 11/8/2022   Hysterectomy No   I am pregnant.  No   I am breastfeeding. No   I want to become pregnant within the next year . No   What do you use for contraception?  IUD   Any problems with current birth control method?   No   Date of last menstrual period 72167   Are your periods irregular? No   Days from the start of one period to the next. 45   Days of Menstral Flow 1   Associated with menstral periods. Headaches;Mood swings;Bloating;Breast tenderness   Are you officially in menopause? (no period for one year or longer)  No        Past Medical History:  Past Medical History:   Diagnosis Date      Anxiety      Attention deficit disorder with hyperactivity(314.01)      Cervical high risk HPV (human papillomavirus) test positive     11/28/05, 6/14/06, 9/20/21     Depression      Depressive disorder Unknown     H/O colposcopy with cervical biopsy 06/07/2005    ECC- within normal limits. no dysplasia     H/O colposcopy with cervical biopsy 07/13/2006    biopsies and ECC- within normal limits. no dysplasia noted.     Infectious mononucleosis 01/01/2004     LSIL (low grade squamous intraepithelial lesion) on Pap smear 05/06/2005 9/20/21       Previous Surgeries:   Past Surgical History:   Procedure Laterality Date     ARTHROSCOPY KNEE RT/LT Left 09/08/2016     AS CLOSED TX NASAL BONE FRACTURE W/ STABILIZATION  03/01/2016     EXAM OF VAGINA,COLPOSCOPY  2006, 2005     EYE SURGERY  01/01/2008    Lasik eye surgery     ORTHOPEDIC SURGERY  9/8/16 and 12/12/16    knee/tibia        Family History:  Family History   Problem Relation Age of Onset     Hypertension Mother      Diabetes Mother      Unknown/Adopted Father      Diabetes Father      Family History Negative Other         Lifestyle History:  Lifestyle 11/8/2022   Do you feel your life is stressful right now?  No   Are you currently implementing any strategies to help manage stress? Yes   What are you doing to manage stress?  Meditation;Breathing exercises;Affirmations;Aromatherapy;Counseling;Journaling;Indulge in food;Listening to music;Sleep;Gratitude        Exercise History:  Exercise 11/8/2022   Does your occupation require extended periods of sitting?  Yes   Does your occupation require extended periods of repetitive movements (ex: walking or lifting)?  Yes   Do you currently participate in any forms of exercise? No   Rate your level of motivation for including exercise in your routine (0=none, 10=high): 8   Do you have any medical conditions, pain, injuries, surgeries etc. restricting you from exercise? No        Sleep History:  Sleep 11/8/2022   How many  hours (on average) do you sleep per night? 7-9   What time do you turn off the lights? 10 PM   How long does it take for you to fall asleep? 25-30 mins   What time do you stop using electronic devices? 11 PM   What time do you wake up? 6 AM   When do you eat your first meal?  9 AM   Do you feel well-rested during the day?  No   Do you take naps?  Yes   Do you have a comfortable bedroom environment (cool, quiet, dark, etc)? Yes   Do you have a sleep routine/ ritual that you do before bed?  Yes   How many hours do you spend per day looking at a screen (TV, computer, tablet and phone)? 10 +   Select all factors that apply to your current sleep habits: Difficulty falling asleep;Have difficulty waking up in the morning;Not hungry in the morning;Take medication for sleep on most night of the week;Snore loudly or have been told you stop breathing;Night sweats;Drink coffee, soda or energy drinks after noon;Have tried supplements (ie: melatonin) for sleep;Grinding teeth        Nutrition History:  Nutrition 11/8/2022   Have you ever had a nutrition consultation? Yes   Do you currently follow a special diet or nutritional program? No   What do you feel are the biggest barriers getting in the way of achieving you nutritional goals? Motivation/Readiness to change;Financial concerns;Lack of social support;Lack of time;Lack of prep/cooking skills;Work (such as lack of time to eat, unhealthy choices, etc.);Lack of nutrition knowledge;TV/computer/social media   Do you have any food allergies, sensitivities or intolerances?  No       Digestion 11/8/2022   Do you experience stomach pains/cramping? Monthly   Do you experience bloating?  Weekly   Do you experience gas?  Daily   Do you experience heartburn/acid reflux/indigestion? Rarely   How often do you have a bowel movement? Every other day   What is a typical bowel movement like for you? Select all that apply: Varies a lot      Food Access:  11/8/2022   Who does the grocery  shopping? Self;Mother;Spouse/Partner;Family member   How often is grocery shopping done? 2 times per week   Where do you usually receive your groceries from? Select all that apply: Target;[employer]co;[employer]   Do you read food labels? No   Who does the cooking? Select all that apply: Self;Mother;Spouse/Partner;Family Member   How many meals do you eat out per week?  3 to 5   What restaurants do you typically choose? Fast Food (Taco Bell, Shell's, Subway, etc.)      Daily Patterns: 11/8/2022   How many days per week do you have breakfast? 1   How many days per week do you have lunch? 6   How many days per week do you have dinner? 7   How many days per week do you have snacks? 5      Protein Intake: 11/8/2022   How many times per day do you typically consume a protein source(s)? 2   What types of protein do you currently eat?  Ground Beef;Beef Ribs;Steak;Hamburgers;Beef Roast;Pork Chops;Pork Ribs;Mckeon/Jamaican Mckeon;Deli Ham;Sausage;Other Pork;[place];Tuna;Shrimp;Lobster;Other Fish;Chicken Breast;Chicken Thighs/Legs;Ground Chicken;Turkey Breast;Deli Turkey;Eggs;Tofu       Fat Intake:  11/8/2022   How many times per day do you typically consume healthy fat(s)? 1   What types of health fats do you currently eat? Select all that apply:  Almonds;Walnuts;Sunflower seeds;Peanut butter;Velazquez;Margarine;Butter;Vegetable oil;Salad dressings;Avocado oil;Olive oil;Avocado       Fruit Intake:  11/8/2022   How many times per day do you typically consume fruits? 1   What types of fruit do currently eat? Apple;Banana;Blackberries;Blueberries;Cantaloupe;Cherries;Grapes;Grapefruit;Honeydew;[place];Melon;Nectarine;Pears;Peaches;Pineapple;Plums;Raspberries;Tangerines;Watermelon       Vegetable Intake:  11/8/2022   How many times per day do you typically consume vegetables? 1   What types of vegetables do you currently eat?  Artichoke;Asparagus;Beans;Broccoli;Cabbage;Carrots;Cauliflower;Celery;Corn;Cucumber;Green onions/scallions;Greens (jluis, kale etc);Peas;Potato (baked, boiled, mashed, French fries);Radish;Soy beans;Spinach;Squash (summer, zucchini);Sugar snap peas;Tomato;Yam, sweet potato      Grain Intake:  11/8/2022   How many times per day do you typically consume grains? 3   What types of grains do currently eat? Select all that apply:  Bagel (non-gluten free);Breads (non-gluten free);Cereals (non-gluten free);Chips (non-gluten free);Crackers (non-gluten free);Muffins (non-gluten free);Pancakes/waffles (non-gluten free);Pasta (non-gluten free);Pretzels (non-gluten free);Other (non-gluten free)       Dairy Intake:  11/8/2022   How many times per day do you typically consume dairy? 2   What types of dairy do currently eat? Select all that apply:  Milk;Cheese;Yogurt;Ice cream       Non-Dairy Alternative Intake:  11/8/2022   How many times per day do you typically consume non-dairy alternatives? 1   What types of non-dairy alternatives do currently eat? Select all that apply:  Oat milk       Sweets Intake:  11/8/2022   How many times per day do you typically consume sweets? 3      Beverage Intake:  11/8/2022   How many 8 oz cups of water do you typically consume per day?  4 to 5   How many 8 oz cups of caffeine do you typically consume per day?  3 to 4   How many drinks of alcohol do you typically consume per week (1 drink = 5 oz wine, 12 oz beer, 1.5 oz spirits)?   1 to 3       Lifestyle Recall:  11/8/2022   What time did you wake up? 7 AM   What time did you go to sleep? 12 AM   What time did you have breakfast? 9-10 AM   Where did you have breakfast?  Work   What time did you have a morning snack? No snack   What time did you have lunch? 11AM-12 PM   Where did you have lunch?  Work   What time did you have an afternoon snack? No snack   What time did you have dinner? 6-7 PM   Where did you have dinner?  Home   What time did  you have an evening snack? No Snack   What time of day did you exercise? 6 PM   Where did you exercise? Gym/Fitness Center          Additional concerns:    MEDICATIONS:  Current Outpatient Medications   Medication Sig Dispense Refill     albuterol (PROAIR HFA/PROVENTIL HFA/VENTOLIN HFA) 108 (90 Base) MCG/ACT inhaler USE 2 INHALATIONS EVERY 6 HOURS MCG/ACT 17 g 1     busPIRone (BUSPAR) 15 MG tablet TAKE 1 TABLET TWICE A  tablet 3     fexofenadine (ALLEGRA) 180 MG tablet Take 180 mg by mouth daily       levonorgestrel (KYLEENA) 19.5 MG IUD 1 each (19.5 mg) by Intrauterine route once for 1 dose 1 each 0     mometasone-formoterol (DULERA) 200-5 MCG/ACT inhaler USE 2 INHALATIONS TWICE A DAY Strength: 200-5 MCG/ACT 39 g 0     montelukast (SINGULAIR) 10 MG tablet Take 1 tablet (10 mg) by mouth At Bedtime 90 tablet 2     traZODone (DESYREL) 50 MG tablet TAKE 1 TABLET AT BEDTIME AS NEEDED (NEED  TO BE SEEN FOR FURTHER REFILLS) 30 tablet 11     triamcinolone (KENALOG) 0.1 % external ointment Apply topically 2 times daily (Patient not taking: Reported on 10/13/2022) 30 g 0     venlafaxine (EFFEXOR XR) 150 MG 24 hr capsule Take 1 capsule (150 mg) by mouth daily 90 capsule 1       Dietary Supplements 11/8/2022   Individual Vitamin Supplements General multivitamin         ALLERGIES:   Allergies   Allergen Reactions     Seasonal Allergies         .na  LABS:  Last Basic Metabolic Panel:  Lab Results   Component Value Date     10/08/2020     05/05/2011     02/08/2011      Lab Results   Component Value Date    POTASSIUM 4.0 10/08/2020    POTASSIUM 3.9 05/05/2011    POTASSIUM 3.5 02/08/2011     Lab Results   Component Value Date    CHLORIDE 107 10/08/2020    CHLORIDE 104 05/05/2011    CHLORIDE 100 02/08/2011     Lab Results   Component Value Date    MARYSE 8.9 10/08/2020    MARYSE 8.6 05/05/2011    MARYSE 8.6 02/08/2011     Lab Results   Component Value Date    CO2 31 10/08/2020    CO2 25 05/05/2011    CO2 22  02/08/2011     Lab Results   Component Value Date    BUN 8 10/08/2020    BUN 7 05/05/2011    BUN 5 02/08/2011     Lab Results   Component Value Date    CR 0.76 10/08/2020    CR 0.48 05/05/2011    CR 0.64 02/08/2011     Lab Results   Component Value Date    GLC 91 10/08/2020    GLC 95 10/18/2019    GLC 94 09/21/2018       Last Glucose Profile:   Hemoglobin A1C   Date Value Ref Range Status   10/13/2022 5.5 0.0 - 5.6 % Final     Comment:     Normal <5.7%   Prediabetes 5.7-6.4%    Diabetes 6.5% or higher     Note: Adopted from ADA consensus guidelines.   09/20/2021 5.6 0.0 - 5.6 % Final     Comment:     Normal <5.7%   Prediabetes 5.7-6.4%    Diabetes 6.5% or higher     Note: Adopted from ADA consensus guidelines.   10/08/2020 5.2 0 - 5.6 % Final     Comment:     Normal <5.7% Prediabetes 5.7-6.4%  Diabetes 6.5% or higher - adopted from ADA   consensus guidelines.         Last Lipid Profile:   Cholesterol   Date Value Ref Range Status   10/13/2022 155 <200 mg/dL Final   09/20/2021 171 <200 mg/dL Final   10/08/2020 171 <200 mg/dL Final   10/18/2019 147 <200 mg/dL Final   09/21/2018 147 <200 mg/dL Final     HDL Cholesterol   Date Value Ref Range Status   10/08/2020 49 (L) >49 mg/dL Final   10/18/2019 53 >49 mg/dL Final   09/21/2018 53 >49 mg/dL Final     Direct Measure HDL   Date Value Ref Range Status   10/13/2022 54 >=50 mg/dL Final   09/20/2021 51 >=50 mg/dL Final     LDL Cholesterol Calculated   Date Value Ref Range Status   10/13/2022 90 <=100 mg/dL Final   09/20/2021 101 (H) <=100 mg/dL Final   10/08/2020 99 <100 mg/dL Final     Comment:     Desirable:       <100 mg/dl   10/18/2019 76 <100 mg/dL Final     Comment:     Desirable:       <100 mg/dl   09/21/2018 81 <100 mg/dL Final     Comment:     Desirable:       <100 mg/dl     Triglycerides   Date Value Ref Range Status   10/13/2022 55 <150 mg/dL Final   09/20/2021 97 <150 mg/dL Final   10/08/2020 113 <150 mg/dL Final   10/18/2019 90 <150 mg/dL Final   09/21/2018 63  "<150 mg/dL Final     Comment:     Fasting specimen     Cholesterol/HDL Ratio   Date Value Ref Range Status   06/18/2015 2.6 0.0 - 5.0 Final   06/17/2009 2.4 0.0 - 5.0 Final       Most recent CBC:  Recent Labs   Lab Test 10/13/22  1205 09/20/21  1316 10/08/20  0901   WBC 6.7 8.0 6.5   HGB 12.0 12.1 12.0   HCT 36.1 36.5 37.4    270 281     Most recent hepatic panel:  No lab results found.    Invalid input(s): TAM, ALP  Most recent creatinine:  Recent Labs   Lab Test 10/08/20  0901   CR 0.76       No components found for: GFRESETIMATEDLASTLAB(gfrestblack:1@  Lab Results   Component Value Date    ALBUMIN 4.5 06/17/2009       Last Thyroid Profile:   TSH   Date Value Ref Range Status   10/13/2022 1.01 0.40 - 4.00 mU/L Final   09/20/2021 1.50 0.40 - 4.00 mU/L Final   10/08/2020 1.27 0.40 - 4.00 mU/L Final   10/18/2019 1.26 0.40 - 4.00 mU/L Final   09/21/2018 1.43 0.40 - 4.00 mU/L Final       Last Mineral Profile:   Ferritin   Date Value Ref Range Status   10/08/2020 38 12 - 150 ng/mL Final     Iron   Date Value Ref Range Status   10/08/2020 85 35 - 180 ug/dL Final     Iron Binding Cap   Date Value Ref Range Status   10/08/2020 294 240 - 430 ug/dL Final       Autoimmune & Inflammatory   No results found for: CRP      Last Vitamin Profile:   No results found for: TDH712, YSWC207, [id number], VITD3, D2VIT, D3VIT, DTOT, [id number], [id number], [id number], [id number], [id number], [id number]    ANTHROPOMETRICS:  Vitals:   BP Readings from Last 1 Encounters:   10/13/22 116/78     Pulse Readings from Last 1 Encounters:   10/13/22 77     Estimated body mass index is 25.25 kg/m  as calculated from the following:    Height as of 10/13/22: 1.595 m (5' 2.8\").    Weight as of 10/13/22: 64.2 kg (141 lb 9.6 oz).    Wt Readings from Last 5 Encounters:   10/13/22 64.2 kg (141 lb 9.6 oz)   07/30/22 61.9 kg (136 lb 6 oz)   03/21/22 61.4 kg (135 lb 7 oz)   12/09/21 62.1 kg (137 lb)   10/28/21 62.8 kg (138 lb 6.4 oz)       NUTRITION " DIAGNOSIS:   1. Altered nutrition-related laboratory values related to endocrine dysfunction as evidenced by elevated BMI, HbA1c     2. Altered nutrition-related laboratory values related to cardiac as evidenced by low HDL.          NUTRITION INTERVENTION:   Long Term Goals:   Goal: Lipid profile; Increase HDL > 49mg/dL within 6 months   Goal: Increase healthy relationship with food   Goal: Lose 20-24lbs in 6 months, recommend average 1-2lbs per week of weight loss.           Short Term Goals:  Goal 1:Improvement Shown: Focus on smoothie for breakfast, ---add in unsweetened flax, hemp or coconut milk, 1 tbsp of healthy fat such as flax seed ground or juanita seed ground. serving of fruit 1 cup of berries. Veggie (optional), 1 scoop of plant based protein powder or collagen powder, 1 serving of fruit and veggie such as kale, spinach, or veggie powder even spirulina - see recipes below or details)      Also, check out some of the other breakfast ideas provided such as oatmeal (naturally gluten free) with dairy free alternative milk, flax seed, nuts such as walnuts, fruit such as berries and protein like eggs or chicken/turkey sausage.      Goal 2: Needs Improvement:  Make sure to eat consistent meals daily with focus on balanced lunch and at least 1-2 snacks daily around 10am and 3pm. Set some reminders on calender to help follow through.   Eating every three to four hours will help keep your insulin and blood sugar normal to help with weight loss  - see meal plan and recipe ideas in the cardi metabolic guides provided.       2 hard boiled eggs.     Greek Yogurt with Blackberries    Fresh Yellow Pear or veggie (radishes, bell peppers, baby cucumbers, carrots or sugar snap peas) with Hummus - try the mini to go packs to help with portion control     Marinated Olives* and Kefir     Purple Plum with Mixed Nuts    Celery with [place] Butter    Dark Chocolate, 70% or higher Cocoa and Pistachio Nuts    Banana with almond butter  or sunflower seed butter     Apple with peanut butter topped with cinnamon     Baby cucumber or carrots, bell pepper, radishes or sugar snap peas with individual servings of guac     Low sodium cottage with peaches or mandarin oranges     Sunflower seeds with strawberries     Turkey jerky or 100% grass fed beef jerky     Plant based protein bar     Simple Mills Humble Flour Crackers or Shirlene Gone Cracker dip with hummus or guac     Avocado on slice of gluten free bread recommend base culture brand or 100% whole grain bread    Goal 3: Needs Improvement: Monitor portion sizes specifically carbs/sugar and reading labels   - consider just having 1 serving of grains per day and swap for lower carb/sugar options such as non-starchy veggies. Read labels for low sugar and carbs.    - consider having alcohol in moderation and or avoiding. Alcohol can increase triglycerides and fat in the liver and create blood sugar imbalances leading to weight gain.      Goal 4: Improvement Shown: Recommend the following supplements to start     One Multivitamin by Pure encapsulations - 1 capsule per day with food     Nordic Naturals Plus vitamin D - 1-2 capsules daily with food ----[email or web address]     Look on amazon to find the supplement recommendations or talk with the [facility] to special order. They may be able to offer a discount as well.     Goal 5: Focus on having some quick healthy lunches - ex chicken salad or tuna salad and set some reminders in calender to help prep and eat throughout.     Goal 6: Work on mindset shifts to help with motivation - check out the daily affirmations abel and motivation abel to provide inspiration and accountability on your phone. The following are two really good applications to add to phone:      1. I am - Daily Affirmations   Manifest motivation reminders by Monkey Taps     2.   Motivation - Daily quotes  Reminders to think positive by  Monkey Taps     Look into [employer] wellness journal.   Daily[employer]unch [employer] Wellness includes a daily food and exercise journal, weekly and quarterly goal planners, training and clean eating tips, weekly shopping lists, healthy habit reminders & mindset coaching.     If you're looking for a journal to help you focus on holistic health, wellness, and vitality, this wellness journal is for you.  [email or web address]-journal-[email or web address]-[email or web address]-guide-for-health-wellness-vitality     Smoothie Recipes to Kickoff Your Success!!     Pineapple Coconut Smoothie      Makes 1-2 serving     1-2 cup unsweetened coconut milk   1 scoop mckeon vanilla plant based protein powder or collagen protein powder by vital proteins (great for just having no flavor - plain)   1 cup frozen pineapple chunks   1 handful spinach (optional)     banana (optional - if added use less pineapple so not as high of sugar content)   1 tbsp shredded unsweetend coconut to top (optional)  1 tbsp ground flax seed (optional)     Method:     In  (recommend vitamix or blend tech) add dairy free alternative milk or filtered water. Add in the rest of ingredients blend on high for 2 mins. If desire thinner consistency add in the water or dairy alternative. Enjoy :)    Kiwi Spirulina Smoothie      Makes 1 servings     1 cup unsweetened macadamia nut milk or filtered water   1 scoop mckeon vanilla plant based protein powder or 1-2 scoops collagen powder from vital proteins (plain)   1-2 Kiwi s peeled   1 handful cilantro   1 lime juiced    - 1 avocado   2 tsp spirulina   1 inch eduard fresh pilled      Method:     In  (recommend vitamix or blend tech) add dairy free alternative milk or filtered water. Add in the rest of ingredients blend on high for 2 mins. If desire thinner consistency add in the water or dairy alternative. Enjoy :)     Green Detox Smoothie      Makes 1 servings     1 cup unsweetened macadamia nut milk  or filtered water (I like filtered water more for this recipe)  1-2 scoops collagen powder from vital proteins (plain)   1 small granny [place] apple (prefer organic - lower pesticides and endocrine disruptors)   1 handful cilantro   1 lime juiced    - 1 avocado (optional)   1 cucumber      Method:     In  (recommend vitamix or blend tech) add dairy free alternative milk or filtered water. Add in the rest of ingredients blend on high for 2 mins. If desire thinner consistency add in the water or dairy alternative. Enjoy :)        Spirulina Banana Smoothie      Makes 1 serving     1-2 cup unsweetened macadamia nut milk or filtered water (I like filtered water more for this recipe)  1 scoop mckeon vanilla plant based protein powder     banana   1 handful spinach    - 1 avocado  1-2 tsp spirulina    1 tbsp ground flax seed      Method:     In  (recommend vitamix or blend tech) add dairy free alternative milk or filtered water. Add in the rest of ingredients blend on high for 2 mins. If desire thinner consistency add in the water or dairy alternative. Enjoy :)     Berry Blast Smoothie      Makes 1 serving     1-2 cup unsweetened macadamia nut milk or filtered water (I like filtered water more for this recipe)  1 scoop mckeon vanilla plant based protein powder or collagen protein powder by vital proteins (plain)   1 cup mixed berries    1 handful spinach    - 1 avocado  1-2 tsp raw organic maqui berry powder by Sunfood superfoods (optional but very immune boosting and jammed with phytonutrients)   1 tbsp ground flax seed      Method:     In  (recommend vitamix or blend tech) add dairy free alternative milk or filtered water. Add in the rest of ingredients blend on high for 2 mins. If desire thinner consistency add in the water or dairy alternative. Enjoy :)     Superfood + Immune Boosting Powders:  Maqui Berry Powder   Turmeric Powder   Spirulina Powder   Glutamine Powder   Matcha Powder   Sammie fresh or  powdered   Kale, dandelion greens or spinach         Omega 3 and Protein Desired Toppings:   Add some healthy protein and omega 3 packed seeds for an extra special nutrient packed topping and a little extra crunch!   1 tsp -1 tbps ground flax seed   1 tsp -1 tbsp hemp seeds   1 tsp-1 tbsp juanita seeds      Mediterranean Approach: Eat whole, unprocessed real foods in their unprocessed forms such as fruits, vegetables, whole grains (prefer gluten free), nuts, legumes, extra virgin olive oil, spices, modest amounts of poultry, and fish.      Avoid inflammatory foods: Eliminate gluten found in wheat, barley, rye, oats, kamut, and spelt for at least 3 weeks to identify any hidden reaction. Gluten sensitivity or allergy can cause many different types of symptoms form migraines to fatigue to weight gain.  If symptoms go away this is a clue you may be reactive to gluten.  Dairy can also be inflammatory consider eliminating for 3 weeks as well. The proteins like casein and whey in dairy can irritate and inflame your gut. Also the sugar lactase in dairy can cause digestive issues in addition to blood sugar spikes.     Cardiometabolic Food plan - 1,800-2,200 calories per day     Protein 10-12 servings per day - include at each meal to stabilize blood sugars   (Choose 3oz or 21g per meal and aim for 1oz of 7g for snacks)   - Strive for 1-2 servings of fish per week especially of higher omega-3 fatty acid containing fish such as salmon.     Legumes <2 serving per day     Dairy alternatives 2-3 serving per day     Nuts and seeds 3-4 servings per day - great to incorporate as snacks    Fats and oils 4 servings per day     Non starchy vegetables 8-10 servings per day     Starchy vegetable limit 1 serving per day as they tend to impact blood sugar (they are moderate-GI).     Fruits 2 servings per day - best to couple with a little bit of protein or fat to offset a rise in blood sugars (they are low-moderate-GI foods).     Whole grains  <2 serving per day - try gluten free whole grains instead        Incorporate protein powder daily:    Plant based hemp (recommended brands: [place], Nutiva, Just Hemp Protein, [employer] [place])      Plant based pea (recommended brands: Naked Pea, Now Sports). If you want to try a combo of pea and hemp the brand Poe in vanilla or chocolate is a great option.     Try Bone broth protein powder or collagen peptides in liquid bone broth, vegetable broth or 12 oz of water as snack. The bone broth powder and collagen can be used for soups as well. This can help provide essential amino acids and minerals that heal your gut as well as balance blood sugars. A great option if you have a hard time tolerating solids.     Can also consider pre made shakes if unable to make smoothies.      Brand examples that are gluten and dairy free to try:   1) Orgain Vegan Protein Shakes, 20g of Plant Based Protein, Creamy Chocolate - Gluten Free, No Dairy, Soy, or Preservatives, No Added Sugar  2) Pirq, Vegan Protein Shake, Turmeric Curcumin, Huma, Plant-Based Protein Drink, Gluten-Free, Dairy-Free, Soy-Free, Non-GMO, Vegetarian, Kosher, Keto, Low Carb, Low Calorie  3) OWYN Pro Elite Vegan Plant-Based High Protein Shake, 35g Protein, 9 Amino Acids, Omega-3, Prebiotics, Superfoods Greens for Workout and Recovery, 0g Net Carbs, Zero Sugar, Keto  4) Ripple Vegan Protein Shake  Chocolate  20g Nutritious Plant Based Pea Protein  Shelf Stable  No GMOs, Soy, Nut, Gluten, Lactose  5) Spiceworks Glucose Support 1.2 Plant based, dairy free, low glycemic index  [email or web address].Authentium/products/glucose-support-1-2-vanilla    Choose Low Glycemic (GI) foods: Regulate your sugar levels by eating foods that do not spike blood sugars.  Eat low -GI foods so only small fluctuations in blood glucose and insulin levels are produced.      Examples of low-GI foods: nuts, seeds, GF oats, most vegetables especially non-starchy and fruits.     Medium or  high-GI foods should be eaten with a protein or fat, both of which blunt the glycemic effect of these foods. This reduces the overall glycemic impact of a meal.   Ex: Most grains and starchy veggies are medium/high GI.     Avoid foods containing refined sugars, artificial sweeteners, and refined grains they are considered high-GI because they lead to sharp increases in blood sugars levels, which increase insulin sensitivity causing increased TG, and low good cholesterol (HDL).   Ex: cakes, cookies, pies, bread, sodas, fruit drinks, presweetened tea, coffee drinks, energy or sport drinks, flavored milk and other processed foods.     Choose foods high in fiber: Aim for at least 5 grams of fiber per serving of food or a total of 25-35 grams fiber per day. Remember, when looking at the label, you can take the fiber away from the total carbs. Ex:15g of total carbs - 4g of fiber = 11g net carbs     Insoluble fiber acts like a bulky  inner broom,  sweeping out debris from the intestine and creating more motility and movement.      Soluble fiber attracts water and swells, creating a gel that slows digestion.  Also, slows the release of glucose from foods into the blood which stops spikes in blood sugar levels.  Soluble fiber traps toxins in the gut, helping to carry them to excretion and provides healthy bacteria in the digestive tract.     Choose High Quality Fats: Adding anti-inflammatory fats into your diet such as fish (salmon, herring, mackerel, and sardines), omega 3 eggs, juanita seeds, ground flax seeds/milk, hemp seeds/milk, walnuts and some other certain leafy greens will increase omega-3 fats to omeaga-6 fats ratio.     Therapeutic fats both monounsaturated and polyunsaturated to include daily: ground flaxseeds, unsalted mixed nuts, avocados, olives, extra-virgin olive oil.     Emphasize high-quality oils and fats in the diet daily such as avocado oil, coconut oil, flaxseed, olive, sesame. Ex: 1 tsp to 1 tbsp of MCT  oil from coconuts can be added into coffee, smoothies, and salad dressings per day.     Avoid trans fats found in processed foods     Drink more water. Hydration is critical, so drink at least six to eight glasses of water a day. Drink more water between meals and less at meals.     Try adding herbal teas (sugar free) or lemon/lime/cucumber/fruit to water for flavor. Avoid artificial sweetener packets to flavor your water.     Cut back on coffee switch to green tea. Avoid adding sugar and milk to coffee instead use dairy alternatives such as almond, flax, coconut milk.     Try another coffee substitute such as   -https://Transmode Systems/  -[email or web address].Radian Memory Systems          NUTRITION RESOURCES:  1. Purchase Eat Fat Get Thin by Manolo Rosenberg Book/Cookbook   2. IFM Cardiometabolic Packet     Functional Nutrition Fundamentals     Comprehensive Guide    Meal plan with recipes             PATIENT'S BEHAVIOR CHANGE GOALS:   See nutrition intervention for patient stated behavior change goals. AVS was printed and given to patient at today's appointment.    MONITOR / EVALUATE:  Registered Dietitian will monitor/evaluate the following:     Beliefs and attitudes related to food    Food and nutrition knowledge / skills    Food / Beverage / Nutrient intake     Pertinent Labs    Progress toward meeting stated nutrition-related goals    Readiness to change nutrition-related behaviors    Weight change    Digestion     COORDINATION OF CARE:  Follow up with primary care provider       FOLLOW-UP:  Follow-up appointment scheduled on Feb 8th at 11am video visits    Time spent in minutes: 37 minutes 2 units   Encounter: Individual    Rand Regalado RD, CLT, LD  Integrative Registered Dietitian

## 2023-01-17 ENCOUNTER — VIRTUAL VISIT (OUTPATIENT)
Dept: FAMILY MEDICINE | Facility: CLINIC | Age: 43
End: 2023-01-17
Payer: COMMERCIAL

## 2023-01-17 DIAGNOSIS — F41.1 GENERALIZED ANXIETY DISORDER: Primary | ICD-10-CM

## 2023-01-17 DIAGNOSIS — G47.9 DISTURBANCE IN SLEEP BEHAVIOR: ICD-10-CM

## 2023-01-17 PROCEDURE — 99214 OFFICE O/P EST MOD 30 MIN: CPT | Mod: 95 | Performed by: NURSE PRACTITIONER

## 2023-01-17 RX ORDER — VENLAFAXINE HYDROCHLORIDE 75 MG/1
225 CAPSULE, EXTENDED RELEASE ORAL DAILY
Qty: 270 CAPSULE | Refills: 1 | Status: SHIPPED | OUTPATIENT
Start: 2023-01-17 | End: 2023-09-07

## 2023-01-17 RX ORDER — TRAZODONE HYDROCHLORIDE 50 MG/1
TABLET, FILM COATED ORAL
Qty: 90 TABLET | Refills: 3 | Status: SHIPPED | OUTPATIENT
Start: 2023-01-17 | End: 2023-10-16

## 2023-01-17 RX ORDER — VENLAFAXINE HYDROCHLORIDE 75 MG/1
75 CAPSULE, EXTENDED RELEASE ORAL DAILY
Qty: 30 CAPSULE | Refills: 1 | Status: SHIPPED | OUTPATIENT
Start: 2023-01-17 | End: 2023-08-04

## 2023-01-17 ASSESSMENT — ANXIETY QUESTIONNAIRES
GAD7 TOTAL SCORE: 10
1. FEELING NERVOUS, ANXIOUS, OR ON EDGE: SEVERAL DAYS
5. BEING SO RESTLESS THAT IT IS HARD TO SIT STILL: SEVERAL DAYS
GAD7 TOTAL SCORE: 10
IF YOU CHECKED OFF ANY PROBLEMS ON THIS QUESTIONNAIRE, HOW DIFFICULT HAVE THESE PROBLEMS MADE IT FOR YOU TO DO YOUR WORK, TAKE CARE OF THINGS AT HOME, OR GET ALONG WITH OTHER PEOPLE: VERY DIFFICULT
7. FEELING AFRAID AS IF SOMETHING AWFUL MIGHT HAPPEN: NOT AT ALL
3. WORRYING TOO MUCH ABOUT DIFFERENT THINGS: MORE THAN HALF THE DAYS
7. FEELING AFRAID AS IF SOMETHING AWFUL MIGHT HAPPEN: NOT AT ALL
GAD7 TOTAL SCORE: 10
2. NOT BEING ABLE TO STOP OR CONTROL WORRYING: MORE THAN HALF THE DAYS
4. TROUBLE RELAXING: MORE THAN HALF THE DAYS
6. BECOMING EASILY ANNOYED OR IRRITABLE: MORE THAN HALF THE DAYS
8. IF YOU CHECKED OFF ANY PROBLEMS, HOW DIFFICULT HAVE THESE MADE IT FOR YOU TO DO YOUR WORK, TAKE CARE OF THINGS AT HOME, OR GET ALONG WITH OTHER PEOPLE?: VERY DIFFICULT

## 2023-01-17 ASSESSMENT — PATIENT HEALTH QUESTIONNAIRE - PHQ9
SUM OF ALL RESPONSES TO PHQ QUESTIONS 1-9: 3
10. IF YOU CHECKED OFF ANY PROBLEMS, HOW DIFFICULT HAVE THESE PROBLEMS MADE IT FOR YOU TO DO YOUR WORK, TAKE CARE OF THINGS AT HOME, OR GET ALONG WITH OTHER PEOPLE: NOT DIFFICULT AT ALL
SUM OF ALL RESPONSES TO PHQ QUESTIONS 1-9: 3

## 2023-01-17 NOTE — PROGRESS NOTES
Barbara is a 42 year old who is being evaluated via a billable video visit.      How would you like to obtain your AVS? MyChart  If the video visit is dropped, the invitation should be resent by: Text to cell phone: [phone]  Will anyone else be joining your video visit? No          Assessment & Plan     Generalized anxiety disorder  Slightly worse, increasing dose to 225 mg.   Healthy habits. Exercise encouraged. Advised light box therapy- tanning with UV risk discussed.   Counseling if needed, prn.   - venlafaxine (EFFEXOR XR) 75 MG 24 hr capsule; Take 1 capsule (75 mg) by mouth daily With 150 mg tab for total dose of 225 mg  - venlafaxine (EFFEXOR XR) 75 MG 24 hr capsule; Take 3 capsules (225 mg) by mouth daily    Disturbance in sleep behavior  Refille.   - traZODone (DESYREL) 50 MG tablet; TAKE 1 TABLET AT BEDTIME AS NEEDED  Strength: 50 mg                 Return in about 6 months (around 7/17/2023), or if symptoms worsen or fail to improve, for Virtual Visit.    MANJULA [facility] LAURIE Jimenez is a 42 year old, presenting for the following health issues:  Recheck Medication      History of Present Illness       Mental Health Follow-up:  Patient presents to follow-up on Anxiety.    Patient's anxiety since last visit has been:  No change  The patient is not having other symptoms associated with anxiety.  Any significant life events: No  Patient is not feeling anxious or having panic attacks.  Patient has no concerns about alcohol or drug use.    She eats 0-1 servings of fruits and vegetables daily.She consumes 2 sweetened beverage(s) daily.She exercises with enough effort to increase her heart rate 20 to 29 minutes per day.  She exercises with enough effort to increase her heart rate 3 or less days per week.   She is taking medications regularly.    Today's PHQ-9         PHQ-9 Total Score: 3    PHQ-9 Q9 Thoughts of better off dead/self-harm past 2 weeks :    Not at all    How difficult have these problems made it for you to do your work, take care of things at home, or get along with other people: Not difficult at all  Today's NELL-7 Score: 10     Having some hot flashing at night.   Feeling more anxious.   Boyfriend is noticing. Relationships are good.   Sleeping well.   No formal exercise.   Is using tanning for light therapy.   Work is busy.   Pt. Would like to go up on Effexor back to 225 mg.           Review of Systems   Constitutional, HEENT, cardiovascular, pulmonary, gi and gu systems are negative, except as otherwise noted.      Objective           Vitals:  No vitals were obtained today due to virtual visit.    Physical Exam   GENERAL: Healthy, alert and no distress  EYES: Eyes grossly normal to inspection.  No discharge or erythema, or obvious scleral/conjunctival abnormalities.  RESP: No audible wheeze, cough, or visible cyanosis.  No visible retractions or increased work of breathing.    SKIN: Visible skin clear. No significant rash, abnormal pigmentation or lesions.  NEURO: Cranial nerves grossly intact.  Mentation and speech appropriate for age.  PSYCH: Mentation appears normal, affect normal/bright, judgement and insight intact, normal speech and appearance well-groomed.                Video-Visit Details    Type of service:  Video Visit     Originating Location (pt. Location): Home  Distant Location (provider location):  Off-site  Platform used for Video Visit: Dary

## 2023-04-08 DIAGNOSIS — J45.30 MILD PERSISTENT ASTHMA WITHOUT COMPLICATION: ICD-10-CM

## 2023-04-08 DIAGNOSIS — G47.9 DISTURBANCE IN SLEEP BEHAVIOR: ICD-10-CM

## 2023-04-08 DIAGNOSIS — F41.1 GENERALIZED ANXIETY DISORDER: ICD-10-CM

## 2023-04-10 RX ORDER — MOMETASONE FUROATE AND FORMOTEROL FUMARATE DIHYDRATE 200; 5 UG/1; UG/1
AEROSOL RESPIRATORY (INHALATION)
Qty: 13 G | Refills: 11 | Status: SHIPPED | OUTPATIENT
Start: 2023-04-10 | End: 2024-05-15

## 2023-04-10 RX ORDER — VENLAFAXINE HYDROCHLORIDE 150 MG/1
150 CAPSULE, EXTENDED RELEASE ORAL DAILY
Qty: 90 CAPSULE | Refills: 1 | Status: SHIPPED | OUTPATIENT
Start: 2023-04-10 | End: 2023-08-04

## 2023-04-10 RX ORDER — ALBUTEROL SULFATE 90 UG/1
AEROSOL, METERED RESPIRATORY (INHALATION)
Qty: 17 G | Refills: 6 | Status: SHIPPED | OUTPATIENT
Start: 2023-04-10 | End: 2023-08-04

## 2023-04-10 NOTE — TELEPHONE ENCOUNTER
Pending Prescriptions:                       Disp   Refills    venlafaxine (EFFEXOR XR) 150 MG 24 hr caps*90 cap*1        Sig: Take 1 capsule (150 mg) by mouth daily    Signed Prescriptions:                        Disp   Refills    albuterol (PROAIR HFA/PROVENTIL HFA/VENTOL*17 g   6        Sig: USE 2 INHALATIONS EVERY 6 HOURS  Authorizing Provider: EDWIN BYRD  Ordering User: CAREN JENSEN    mometasone-formoterol (DULERA) 200-5 MCG/A*13 g   11       Sig: USE 2 INHALATIONS TWICE A DAY (NEED TO BE SEEN FOR           FURTHER REFILLS)  Authorizing Provider: EDWIN BYRD  Ordering User: CAREN JENSEN        Routing refill request to provider for review/approval because:  Drug not on the [facility] refill protocol

## 2023-04-10 NOTE — TELEPHONE ENCOUNTER
Prescription approved per [facility] Refill Protocol.  MANOJ GongN, RN, PHN  [place]  April 10, 2023

## 2023-07-07 ENCOUNTER — E-VISIT (OUTPATIENT)
Dept: URGENT CARE | Facility: CLINIC | Age: 43
End: 2023-07-07
Payer: COMMERCIAL

## 2023-07-07 ENCOUNTER — LAB (OUTPATIENT)
Dept: LAB | Facility: CLINIC | Age: 43
End: 2023-07-07
Payer: COMMERCIAL

## 2023-07-07 DIAGNOSIS — R30.0 DYSURIA: Primary | ICD-10-CM

## 2023-07-07 DIAGNOSIS — N30.01 ACUTE CYSTITIS WITH HEMATURIA: Primary | ICD-10-CM

## 2023-07-07 DIAGNOSIS — R30.0 DYSURIA: ICD-10-CM

## 2023-07-07 LAB
ALBUMIN UR-MCNC: NEGATIVE MG/DL
APPEARANCE UR: CLEAR
BACTERIA #/AREA URNS HPF: ABNORMAL /HPF
BILIRUB UR QL STRIP: NEGATIVE
COLOR UR AUTO: YELLOW
GLUCOSE UR STRIP-MCNC: NEGATIVE MG/DL
HGB UR QL STRIP: ABNORMAL
KETONES UR STRIP-MCNC: NEGATIVE MG/DL
LEUKOCYTE ESTERASE UR QL STRIP: ABNORMAL
NITRATE UR QL: NEGATIVE
PH UR STRIP: 6 [PH] (ref 5–7)
RBC #/AREA URNS AUTO: ABNORMAL /HPF
SP GR UR STRIP: <=1.005 (ref 1–1.03)
SQUAMOUS #/AREA URNS AUTO: ABNORMAL /LPF
UROBILINOGEN UR STRIP-ACNC: 0.2 E.U./DL
WBC #/AREA URNS AUTO: ABNORMAL /HPF
WBC CLUMPS #/AREA URNS HPF: PRESENT /HPF

## 2023-07-07 PROCEDURE — 99207 PR NO CHARGE LOS: CPT | Performed by: PREVENTIVE MEDICINE

## 2023-07-07 PROCEDURE — 81001 URINALYSIS AUTO W/SCOPE: CPT

## 2023-07-07 PROCEDURE — 87086 URINE CULTURE/COLONY COUNT: CPT

## 2023-07-07 RX ORDER — NITROFURANTOIN 25; 75 MG/1; MG/1
100 CAPSULE ORAL 2 TIMES DAILY
Qty: 10 CAPSULE | Refills: 0 | Status: SHIPPED | OUTPATIENT
Start: 2023-07-07 | End: 2023-07-12

## 2023-07-07 NOTE — PATIENT INSTRUCTIONS
Dear Barbara Yates,     After reviewing your responses, I would like you to come in for a urine test to make sure we treat you correctly. This urine test is to evaluate you for a possible urinary tract infection, and should be scheduled for today or tomorrow. Schedule a Lab Only appointment here.     Lab appointments are not available at most locations on the weekends. If no Lab Only appointment is available, you should be seen in any of our convenient Walk-in or Urgent Care Centers, which can be found on our website here.     You will receive instructions with your results in [employer] once they are available.     If your symptoms worsen, you develop pain in your back or stomach, develop fevers, or are not improving in 5 days, please contact your primary care provider for an appointment or visit a Walk-in or Urgent Care Center to be seen.     Thanks again for choosing us as your health care partner,     Giorgi Perry MD, MD    Dysuria with Uncertain Cause (Adult)    The urethra is the tube that allows urine to pass out of the body. In a woman, the urethra is the opening above the vagina. In men, the urethra is the opening on the tip of the penis. Dysuria is the feeling of pain or burning in the urethra when passing urine.   Dysuria can be caused by anything that irritates or inflames the urethra. An infection or chemical irritation can cause this reaction. A bladder infection is the most common cause of dysuria in adults. A urine test can diagnose this. A bladder infection needs antibiotic treatment.   Soaps, lotions, colognes, and feminine hygiene products can cause dysuria. So can birth control jellies, creams, and foams. It will go away 1 to 3 days after discontinuing the use of these irritants.   Sexually transmitted infections (STIs), such as chlamydia or gonorrhea, can cause dysuria. Your healthcare provider may take a culture sample. Your provider may start you on antibiotic medicine  before the culture test returns.   In women who have gone through menopause, dysuria can be from dryness in the lining of the urethra. This can be treated with hormones. Dysuria becomes long-term (chronic) when it lasts for weeks or months. You may need to see a specialist (urologist) to diagnose and treat chronic dysuria.   Home care  These home care tips may help:    Don't use any chemicals or products that you think may be causing your symptoms.    If you were given a prescription medicine, take as directed. Take it until it's all used up.    If a culture was taken, don't have sex until you have been told that it is negative. A negative culture means you don't have an infection. Then follow your healthcare provider's advice to treat your condition.  If a culture was done and it is positive:     Both you and your sexual partner may need to be treated. This is true even if your partner has no symptoms.    Contact your healthcare provider or go to an urgent care clinic or the public health department to be looked at and treated.    Don't have sex until both you and your partner have finished all antibiotics and your healthcare provider says you are no longer contagious.    Learn about and use safe sex practices. The safest sex is with a partner who has tested negative and only has sex with you. Condoms can prevent STIs from spreading, but they aren't a guarantee.  Follow-up care  Follow up with your healthcare provider, or as advised. If a culture was taken, you may call as directed for the results. If you have an STI, follow up with your provider or the public health department for a complete STI screening, including HIV testing. For more information, contact CDC-INFO at 899-377-3886.   When to call your healthcare provider   Call your healthcare provider right away if any of these occur:    You aren't better after 3 days of treatment    Fever of 100.4 F (38 C) or higher, or as directed by your healthcare  provider    Back or belly pain that gets worse    You can't urinate because of pain    New discharge from the urethra, vagina, or penis    Painful sores on the penis    Rash or joint pain    Painful lumps (lymph nodes) in the groin    Testicle pain or swelling of the scrotum  Germ[place] last reviewed this educational content on 6/1/2022 2000-2022 [employer]. All rights reserved. This information is not intended as a substitute for professional medical care. Always follow your healthcare professional's instructions.

## 2023-07-09 LAB — BACTERIA UR CULT: NORMAL

## 2023-08-01 ENCOUNTER — OFFICE VISIT (OUTPATIENT)
Dept: PEDIATRICS | Facility: CLINIC | Age: 43
End: 2023-08-01
Payer: COMMERCIAL

## 2023-08-01 ENCOUNTER — NURSE TRIAGE (OUTPATIENT)
Dept: FAMILY MEDICINE | Facility: CLINIC | Age: 43
End: 2023-08-01
Payer: COMMERCIAL

## 2023-08-01 VITALS
HEIGHT: 63 IN | HEART RATE: 100 BPM | WEIGHT: 150 LBS | OXYGEN SATURATION: 97 % | TEMPERATURE: 98.6 F | BODY MASS INDEX: 26.58 KG/M2 | RESPIRATION RATE: 14 BRPM | SYSTOLIC BLOOD PRESSURE: 112 MMHG | DIASTOLIC BLOOD PRESSURE: 69 MMHG

## 2023-08-01 DIAGNOSIS — B34.9 VIRAL SYNDROME: Primary | ICD-10-CM

## 2023-08-01 DIAGNOSIS — R11.0 NAUSEA: ICD-10-CM

## 2023-08-01 DIAGNOSIS — R42 DIZZINESS: ICD-10-CM

## 2023-08-01 DIAGNOSIS — E86.0 DEHYDRATION: ICD-10-CM

## 2023-08-01 DIAGNOSIS — R34 LOW URINE OUTPUT: ICD-10-CM

## 2023-08-01 PROCEDURE — 96360 HYDRATION IV INFUSION INIT: CPT | Performed by: INTERNAL MEDICINE

## 2023-08-01 PROCEDURE — 96361 HYDRATE IV INFUSION ADD-ON: CPT | Performed by: INTERNAL MEDICINE

## 2023-08-01 PROCEDURE — 99215 OFFICE O/P EST HI 40 MIN: CPT | Mod: 25 | Performed by: INTERNAL MEDICINE

## 2023-08-01 RX ORDER — ONDANSETRON 8 MG/1
8 TABLET, ORALLY DISINTEGRATING ORAL EVERY 8 HOURS PRN
Qty: 30 TABLET | Refills: 0 | Status: SHIPPED | OUTPATIENT
Start: 2023-08-01

## 2023-08-01 RX ORDER — ONDANSETRON 4 MG/1
4 TABLET, ORALLY DISINTEGRATING ORAL ONCE
Status: COMPLETED | OUTPATIENT
Start: 2023-08-01 | End: 2023-08-01

## 2023-08-01 RX ADMIN — ONDANSETRON 4 MG: 4 TABLET, ORALLY DISINTEGRATING ORAL at 11:05

## 2023-08-01 ASSESSMENT — PAIN SCALES - GENERAL: PAINLEVEL: NO PAIN (0)

## 2023-08-01 ASSESSMENT — ENCOUNTER SYMPTOMS
SORE THROAT: 0
NECK PAIN: 1
DYSURIA: 0
ACTIVITY CHANGE: 1
NAUSEA: 1
RHINORRHEA: 0
DIZZINESS: 1
ABDOMINAL PAIN: 0
CHILLS: 1
FEVER: 1
DIARRHEA: 0
ADENOPATHY: 1
COUGH: 0
SINUS PRESSURE: 1
APPETITE CHANGE: 1
FATIGUE: 1
VOMITING: 0
MYALGIAS: 1
HEMATURIA: 0
HEADACHES: 1

## 2023-08-01 NOTE — TELEPHONE ENCOUNTER
Patient calls regarding symptoms. She took home covid test and it was negative. No one around her has been sick. Went to ED on Sunday. Patient states that she has been having chills since then. She has been using OTC advil and tylenol and still can't get this to go away. She also has whole body aches and there is a lot of pressure in her head. She prefers the dark. When she coughs, her head feels worse. She states that she hasn't eaten much since Saturday. Patient rates her body aches/headache at 8/10. Patient states that yesterday it took 800mg of ibuprofen and 2 hr and 45 min for her to feel better. Urinating at least every 12 hours. States that she does feel lightheaded and her mouth is very dry.     Per protocol, recommended that patient be seen in ED due to signs of dehydration and severe pain. Patient is in agreement. Denies further questions. She will go to a different ED than she did on Sunday.    MANOJ OvalleN, RN, PHN  Registered Nurse-Clinic Triage  [facility]  8/1/2023 at 8:02 AM     Reason for Disposition   Drinking very little and dehydration suspected (e.g., no urine > 12 hours, very dry mouth, very lightheaded)    Additional Information   Negative: Shock suspected (e.g., cold/pale/clammy skin, too weak to stand, low BP, rapid pulse)   Negative: Difficult to awaken or acting confused (e.g., disoriented, slurred speech)   Negative: Sounds like a life-threatening emergency to the triager   Negative: Chest pain   Negative: Arm pains with exertion (e.g., walking)   Negative: Muscle aches from influenza (flu) suspected   Negative: Muscle aches from heat exposure suspected   Negative: Lyme disease suspected (e.g., bull's eye rash or tick bite / exposure in past month)   Negative: Pain only in back   Negative: Pain in one arm OR arm pains caused by recent vigorous activity (e.g., sports, lifting, overuse)   Negative: Pain in one leg OR leg pains caused by recent vigorous  activity (e.g., sports, lifting, overuse)   Negative: Rash over large area or most of the body (widespread or generalized)   Negative: Dark (cola colored) or red-colored urine    Protocols used: Muscle Aches and Body Pain-A-[place]

## 2023-08-01 NOTE — TELEPHONE ENCOUNTER
Patient calls back. She went to the [facility]. They asked her if she wanted fluids but they misunderstood her when she said she didn't want them sitting down and thought she didn't want them at all.       Patient states that she is sweating. She doesn't know if she has a fever now. She has the chills. She doesn't know what to do. She states that her pain is at a 6-7/10.     Discussed [facility]. Patient is in agreement with this. Contacted [facility]. They will accept patient. 10 am appointment. Contacted patient and updated her. Provided her with address.     Cheyanne Ladd, MANOJN, RN, PHN  Registered Nurse-Clinic Triage  [facility]  8/1/2023 at 9:25 AM

## 2023-08-01 NOTE — PATIENT INSTRUCTIONS
You received 1 liter of IV fluids  And zofran    You urinated once & able to drink fluids    Fluids  Rest.  Zofran for nausea   Purchase thermometer & monitor for fever >100  Take temperature prior to tylenol or ibuprofen     Viral syndromes usually resolve in 1-2 weeks  Recheck with primary clinic next week.

## 2023-08-01 NOTE — PROGRESS NOTES
Assessment & Plan   Problem List Items Addressed This Visit    None  Visit Diagnoses       Viral syndrome    -  Primary    Dehydration        Relevant Medications    sodium chloride (PF) 0.9% PF flush 3 mL    lactated ringers BOLUS 1,000 mL (Completed)    Other Relevant Orders    IV access    Dizziness        Relevant Medications    ondansetron (ZOFRAN ODT) 8 MG ODT tab    ondansetron (ZOFRAN ODT) ODT tab 4 mg (Completed)    Low urine output        Nausea        Relevant Medications    ondansetron (ZOFRAN ODT) 8 MG ODT tab    ondansetron (ZOFRAN ODT) ODT tab 4 mg (Completed)           40-year-old to female with viral syndrome who presents to [facility] for IV fluids for dehydration related to her illness.      Patient Instructions   You received 1 liter of IV fluids  And zofran    You urinated once & able to drink fluids    Fluids  Rest.  Zofran for nausea     Viral syndromes usually resolve in 1-2 weeks  Recheck with primary clinic next week.                Return in about 1 week (around 8/8/2023).    40 minutes spent by me on the date of the encounter doing chart review, history and exam, documentation and further activities per the note       Follow-up primary provider 1 week - virtual visit  CC chart to primary provider    Moni Reaves MD  [facility] XIMENA Jimenez is a 42 year old, presenting for the following health issues:  Generalized Body Aches, Fever, and Dehydration      HPI     Acute Illness  Acute illness concerns: Body Aches, Cough, dehydration   Onset/Duration: 7/29  Symptoms:  Fever: YES  Chills/Sweats: YES  Headache (location?): YES  Sinus Pressure: No           Decreased energy level: YES  Conjunctivitis:  No  Ear Pain: no  Rhinorrhea: No  Congestion: No  Sore Throat: No  Cough: YES-non-productive, productive of clear sputum  Wheeze: No           Breathing fast: No  Decreased Appetite/Intake: YES  Nausea: No  Vomiting: No  Diarrhea: No  Genitourinary  symptoms: No  Progression of symptoms: worse constant  Sick/Strep Exposure: No  Therapies tried and outcome: Cold and Flu this morning and has been taking 800 mg of advil and alternating with tylenol starting Sunday afternoon.     Per [facility] provider note:    Referral from Nurse Dorsey from [facility] for [facility] patient at [facility].  Referral for IV fluids for dehydration.  42-year-old female who was seen 2 days ago in the ER for viral URI with cough.  Illness symptoms for 4 days  She has ongoing symptoms of feeling feverish, chills, body aches 8/10 and dizziness.  She was offered IV fluids at the emergency room but misunderstood.  Taking Tylenol and ibuprofen for symptoms.  Patient currently complaining of dry mouth and urinating once a day.  Referral for IV fluids.  Home covid negative today    Outside system ER note and labs reviewed.  Presented with neck pain and chills.  Diagnosed with reactive lymphadenopathy with viral URI.  Patient was strep, COVID and influenza negative.  Metabolic panel notable for sodium 135, CRP 0.6 white count 4.3.  Otherwise labs were completely normal      Review of Systems   Constitutional:  Positive for activity change, appetite change, chills, fatigue and fever (No thermometer.  Feels feverish.  100.9 at ER).   HENT:  Positive for sinus pressure. Negative for dental problem, ear pain, rhinorrhea and sore throat.    Respiratory:  Negative for cough.    Gastrointestinal:  Positive for nausea. Negative for abdominal pain, diarrhea and vomiting.   Genitourinary:  Positive for decreased urine volume (daily). Negative for dysuria and hematuria.   Musculoskeletal:  Positive for myalgias (range 2/10) and neck pain.   Skin:  Negative for rash.   Neurological:  Positive for dizziness and headaches.   Hematological:  Positive for adenopathy (improved).          Patient Active Problem List   Diagnosis    Disturbance in sleep behavior    Urinary frequency    Papanicolaou smear  "of cervix with low grade squamous intraepithelial lesion (LGSIL)    Other abnormal Papanicolaou smear of cervix and cervical HPV(795.09)    CARDIOVASCULAR SCREENING; LDL GOAL LESS THAN 160    Generalized anxiety disorder    Moderate major depression (H)    Anal fissure    Acquired subluxation of left patella, subsequent encounter     Current Outpatient Medications   Medication Sig Dispense Refill    albuterol (PROAIR HFA/PROVENTIL HFA/VENTOLIN HFA) 108 (90 Base) MCG/ACT inhaler USE 2 INHALATIONS EVERY 6 HOURS 17 g 6    busPIRone (BUSPAR) 15 MG tablet TAKE 1 TABLET TWICE A  tablet 3    fexofenadine (ALLEGRA) 180 MG tablet Take 180 mg by mouth daily      levonorgestrel (KYLEENA) 19.5 MG IUD 1 each (19.5 mg) by Intrauterine route once for 1 dose 1 each 0    mometasone-formoterol (DULERA) 200-5 MCG/ACT inhaler USE 2 INHALATIONS TWICE A DAY (NEED TO BE SEEN FOR FURTHER REFILLS) 13 g 11    montelukast (SINGULAIR) 10 MG tablet Take 1 tablet (10 mg) by mouth At Bedtime 90 tablet 2    traZODone (DESYREL) 50 MG tablet TAKE 1 TABLET AT BEDTIME AS NEEDED  Strength: 50 mg 90 tablet 3    triamcinolone (KENALOG) 0.1 % external ointment Apply topically 2 times daily 30 g 0    venlafaxine (EFFEXOR XR) 150 MG 24 hr capsule Take 1 capsule (150 mg) by mouth daily 90 capsule 1    venlafaxine (EFFEXOR XR) 75 MG 24 hr capsule Take 1 capsule (75 mg) by mouth daily With 150 mg tab for total dose of 225 mg 30 capsule 1    venlafaxine (EFFEXOR XR) 75 MG 24 hr capsule Take 3 capsules (225 mg) by mouth daily 270 capsule 1           Objective    /69 (BP Location: Right arm, Patient Position: Sitting, Cuff Size: Adult Regular)   Pulse 100   Temp 98.6  F (37  C) (Oral)   Resp 14   Ht 1.595 m (5' 2.8\")   Wt 68 kg (150 lb)   SpO2 97%   BMI 26.74 kg/m    Body mass index is 26.74 kg/m .  Physical Exam  Vitals reviewed.   Constitutional:       Appearance: Normal appearance. She is not ill-appearing or toxic-appearing.      Comments: " Appears fatigued   HENT:      Mouth/Throat:      Mouth: Mucous membranes are dry.   Cardiovascular:      Rate and Rhythm: Regular rhythm. Tachycardia present.      Pulses: Normal pulses.      Heart sounds: Normal heart sounds.   Pulmonary:      Effort: Pulmonary effort is normal.      Breath sounds: Normal breath sounds.   Abdominal:      Palpations: Abdomen is soft.      Tenderness: There is no abdominal tenderness. There is no right CVA tenderness or left CVA tenderness.   Skin:     Findings: No rash.   Neurological:      Mental Status: She is alert.

## 2023-08-02 ENCOUNTER — TELEPHONE (OUTPATIENT)
Dept: FAMILY MEDICINE | Facility: CLINIC | Age: 43
End: 2023-08-02
Payer: COMMERCIAL

## 2023-08-02 NOTE — TELEPHONE ENCOUNTER
SITUATION:   UTI    BACKGROUND:   Symptoms started this morning.   Symptoms include after going to the bathroom she saw some blood.     PLAN:   Schedule an e-visit.       MANOJ GongN, RN, DOUGN  [place]  August 2, 2023

## 2023-08-02 NOTE — TELEPHONE ENCOUNTER
Patient calling in asking if provider can prescibe antibiotic for uti.  Patient has had frequent urination since this morning.  Patient was advised of appointment but declined making appointment at time.  Patient wanted message sent first to provider. Patient would like callback with providers response and prefers [facility][id number] [place], [place] - [facility] [address] AT [facility]

## 2023-08-02 NOTE — TELEPHONE ENCOUNTER
Call pt. She had IV fluids yesterday. Needs triage  Needs eval/UA for this, advise Any[facility]   MANJULA Guerrero CNP

## 2023-08-04 ENCOUNTER — TELEPHONE (OUTPATIENT)
Dept: FAMILY MEDICINE | Facility: CLINIC | Age: 43
End: 2023-08-04
Payer: COMMERCIAL

## 2023-08-04 ENCOUNTER — HOSPITAL ENCOUNTER (INPATIENT)
Facility: CLINIC | Age: 43
LOS: 2 days | Discharge: HOME | DRG: 809 | End: 2023-08-06
Attending: EMERGENCY MEDICINE | Admitting: INTERNAL MEDICINE
Payer: COMMERCIAL

## 2023-08-04 ENCOUNTER — APPOINTMENT (OUTPATIENT)
Dept: ULTRASOUND IMAGING | Facility: CLINIC | Age: 43
DRG: 809 | End: 2023-08-04
Attending: EMERGENCY MEDICINE
Payer: COMMERCIAL

## 2023-08-04 ENCOUNTER — APPOINTMENT (OUTPATIENT)
Dept: GENERAL RADIOLOGY | Facility: CLINIC | Age: 43
DRG: 809 | End: 2023-08-04
Attending: EMERGENCY MEDICINE
Payer: COMMERCIAL

## 2023-08-04 DIAGNOSIS — R74.01 TRANSAMINITIS: Primary | ICD-10-CM

## 2023-08-04 DIAGNOSIS — R50.81 FEBRILE NEUTROPENIA (H): ICD-10-CM

## 2023-08-04 DIAGNOSIS — D70.9 FEBRILE NEUTROPENIA (H): ICD-10-CM

## 2023-08-04 DIAGNOSIS — Z20.822 CONTACT WITH AND (SUSPECTED) EXPOSURE TO COVID-19: ICD-10-CM

## 2023-08-04 LAB
ALBUMIN SERPL BCG-MCNC: 4.3 G/DL (ref 3.5–5.2)
ALBUMIN UR-MCNC: NEGATIVE MG/DL
ALP SERPL-CCNC: 283 U/L (ref 35–104)
ALT SERPL W P-5'-P-CCNC: 504 U/L (ref 0–50)
ANION GAP SERPL CALCULATED.3IONS-SCNC: 11 MMOL/L (ref 7–15)
APAP SERPL-MCNC: <5 UG/ML (ref 10–30)
APPEARANCE UR: CLEAR
AST SERPL W P-5'-P-CCNC: 450 U/L (ref 0–45)
BASOPHILS # BLD MANUAL: 0 10E3/UL (ref 0–0.2)
BASOPHILS NFR BLD MANUAL: 0 %
BILIRUB SERPL-MCNC: 0.3 MG/DL
BILIRUB UR QL STRIP: NEGATIVE
BUN SERPL-MCNC: 5.4 MG/DL (ref 6–20)
CALCIUM SERPL-MCNC: 8.6 MG/DL (ref 8.6–10)
CHLORIDE SERPL-SCNC: 101 MMOL/L (ref 98–107)
COLOR UR AUTO: YELLOW
CREAT SERPL-MCNC: 0.83 MG/DL (ref 0.51–0.95)
CRP SERPL-MCNC: 8.93 MG/L
DEPRECATED HCO3 PLAS-SCNC: 24 MMOL/L (ref 22–29)
EOSINOPHIL # BLD MANUAL: 0 10E3/UL (ref 0–0.7)
EOSINOPHIL NFR BLD MANUAL: 1 %
ERYTHROCYTE [DISTWIDTH] IN BLOOD BY AUTOMATED COUNT: 13.7 % (ref 10–15)
ERYTHROCYTE [SEDIMENTATION RATE] IN BLOOD BY WESTERGREN METHOD: 11 MM/HR (ref 0–20)
FERRITIN SERPL-MCNC: 2785 NG/ML (ref 6–175)
FLUAV RNA SPEC QL NAA+PROBE: NEGATIVE
FLUBV RNA RESP QL NAA+PROBE: NEGATIVE
GFR SERPL CREATININE-BSD FRML MDRD: 90 ML/MIN/1.73M2
GLUCOSE SERPL-MCNC: 90 MG/DL (ref 70–99)
GLUCOSE UR STRIP-MCNC: NEGATIVE MG/DL
HCG UR QL: NEGATIVE
HCT VFR BLD AUTO: 37.4 % (ref 35–47)
HGB BLD-MCNC: 12.3 G/DL (ref 11.7–15.7)
HGB UR QL STRIP: NEGATIVE
INR PPP: 0.99 (ref 0.85–1.15)
KETONES UR STRIP-MCNC: NEGATIVE MG/DL
LEUKOCYTE ESTERASE UR QL STRIP: ABNORMAL
LYMPHOCYTES # BLD MANUAL: 1 10E3/UL (ref 0.8–5.3)
LYMPHOCYTES NFR BLD MANUAL: 60 %
MAGNESIUM SERPL-MCNC: 2.1 MG/DL (ref 1.7–2.3)
MCH RBC QN AUTO: 29.3 PG (ref 26.5–33)
MCHC RBC AUTO-ENTMCNC: 32.9 G/DL (ref 31.5–36.5)
MCV RBC AUTO: 89 FL (ref 78–100)
MONOCYTES # BLD MANUAL: 0.2 10E3/UL (ref 0–1.3)
MONOCYTES NFR BLD AUTO: NEGATIVE %
MONOCYTES NFR BLD MANUAL: 12 %
NEUTROPHILS # BLD MANUAL: 0.5 10E3/UL (ref 1.6–8.3)
NEUTROPHILS NFR BLD MANUAL: 27 %
NITRATE UR QL: NEGATIVE
PH UR STRIP: 7 [PH] (ref 5–7)
PLAT MORPH BLD: ABNORMAL
PLATELET # BLD AUTO: 130 10E3/UL (ref 150–450)
POTASSIUM SERPL-SCNC: 3.7 MMOL/L (ref 3.4–5.3)
PROCALCITONIN SERPL IA-MCNC: 0.26 NG/ML
PROT SERPL-MCNC: 7.3 G/DL (ref 6.4–8.3)
RBC # BLD AUTO: 4.2 10E6/UL (ref 3.8–5.2)
RBC MORPH BLD: ABNORMAL
RBC URINE: 1 /HPF
RSV RNA SPEC NAA+PROBE: NEGATIVE
SARS-COV-2 RNA RESP QL NAA+PROBE: NEGATIVE
SODIUM SERPL-SCNC: 136 MMOL/L (ref 136–145)
SP GR UR STRIP: 1.01 (ref 1–1.03)
SQUAMOUS EPITHELIAL: 5 /HPF
UROBILINOGEN UR STRIP-MCNC: 2 MG/DL
WBC # BLD AUTO: 1.7 10E3/UL (ref 4–11)
WBC URINE: 3 /HPF

## 2023-08-04 PROCEDURE — 87340 HEPATITIS B SURFACE AG IA: CPT | Performed by: EMERGENCY MEDICINE

## 2023-08-04 PROCEDURE — 99285 EMERGENCY DEPT VISIT HI MDM: CPT | Mod: 25

## 2023-08-04 PROCEDURE — 87389 HIV-1 AG W/HIV-1&-2 AB AG IA: CPT | Performed by: EMERGENCY MEDICINE

## 2023-08-04 PROCEDURE — 81001 URINALYSIS AUTO W/SCOPE: CPT | Performed by: EMERGENCY MEDICINE

## 2023-08-04 PROCEDURE — 99223 1ST HOSP IP/OBS HIGH 75: CPT | Mod: GC | Performed by: INTERNAL MEDICINE

## 2023-08-04 PROCEDURE — 86706 HEP B SURFACE ANTIBODY: CPT | Performed by: EMERGENCY MEDICINE

## 2023-08-04 PROCEDURE — 87040 BLOOD CULTURE FOR BACTERIA: CPT | Performed by: EMERGENCY MEDICINE

## 2023-08-04 PROCEDURE — 80053 COMPREHEN METABOLIC PANEL: CPT | Performed by: EMERGENCY MEDICINE

## 2023-08-04 PROCEDURE — 86663 EPSTEIN-BARR ANTIBODY: CPT | Performed by: STUDENT IN AN ORGANIZED HEALTH CARE EDUCATION/TRAINING PROGRAM

## 2023-08-04 PROCEDURE — 87637 SARSCOV2&INF A&B&RSV AMP PRB: CPT | Performed by: EMERGENCY MEDICINE

## 2023-08-04 PROCEDURE — 120N000005 HC R&B MS OVERFLOW UMMC

## 2023-08-04 PROCEDURE — 81025 URINE PREGNANCY TEST: CPT | Performed by: EMERGENCY MEDICINE

## 2023-08-04 PROCEDURE — 87086 URINE CULTURE/COLONY COUNT: CPT | Performed by: STUDENT IN AN ORGANIZED HEALTH CARE EDUCATION/TRAINING PROGRAM

## 2023-08-04 PROCEDURE — 84145 PROCALCITONIN (PCT): CPT | Performed by: EMERGENCY MEDICINE

## 2023-08-04 PROCEDURE — 87529 HSV DNA AMP PROBE: CPT | Performed by: STUDENT IN AN ORGANIZED HEALTH CARE EDUCATION/TRAINING PROGRAM

## 2023-08-04 PROCEDURE — 93005 ELECTROCARDIOGRAM TRACING: CPT

## 2023-08-04 PROCEDURE — 86665 EPSTEIN-BARR CAPSID VCA: CPT | Performed by: STUDENT IN AN ORGANIZED HEALTH CARE EDUCATION/TRAINING PROGRAM

## 2023-08-04 PROCEDURE — 80143 DRUG ASSAY ACETAMINOPHEN: CPT | Performed by: EMERGENCY MEDICINE

## 2023-08-04 PROCEDURE — 86803 HEPATITIS C AB TEST: CPT | Performed by: EMERGENCY MEDICINE

## 2023-08-04 PROCEDURE — 85652 RBC SED RATE AUTOMATED: CPT | Performed by: EMERGENCY MEDICINE

## 2023-08-04 PROCEDURE — 86709 HEPATITIS A IGM ANTIBODY: CPT | Performed by: EMERGENCY MEDICINE

## 2023-08-04 PROCEDURE — 85027 COMPLETE CBC AUTOMATED: CPT | Performed by: EMERGENCY MEDICINE

## 2023-08-04 PROCEDURE — 86308 HETEROPHILE ANTIBODY SCREEN: CPT | Performed by: EMERGENCY MEDICINE

## 2023-08-04 PROCEDURE — 250N000011 HC RX IP 250 OP 636: Mod: JZ | Performed by: STUDENT IN AN ORGANIZED HEALTH CARE EDUCATION/TRAINING PROGRAM

## 2023-08-04 PROCEDURE — 36415 COLL VENOUS BLD VENIPUNCTURE: CPT | Performed by: EMERGENCY MEDICINE

## 2023-08-04 PROCEDURE — 85610 PROTHROMBIN TIME: CPT | Performed by: STUDENT IN AN ORGANIZED HEALTH CARE EDUCATION/TRAINING PROGRAM

## 2023-08-04 PROCEDURE — 99284 EMERGENCY DEPT VISIT MOD MDM: CPT | Performed by: EMERGENCY MEDICINE

## 2023-08-04 PROCEDURE — 86704 HEP B CORE ANTIBODY TOTAL: CPT | Performed by: EMERGENCY MEDICINE

## 2023-08-04 PROCEDURE — 96374 THER/PROPH/DIAG INJ IV PUSH: CPT

## 2023-08-04 PROCEDURE — 71046 X-RAY EXAM CHEST 2 VIEWS: CPT | Mod: 26 | Performed by: RADIOLOGY

## 2023-08-04 PROCEDURE — 82728 ASSAY OF FERRITIN: CPT | Performed by: STUDENT IN AN ORGANIZED HEALTH CARE EDUCATION/TRAINING PROGRAM

## 2023-08-04 PROCEDURE — 83735 ASSAY OF MAGNESIUM: CPT | Performed by: EMERGENCY MEDICINE

## 2023-08-04 PROCEDURE — 36415 COLL VENOUS BLD VENIPUNCTURE: CPT | Performed by: STUDENT IN AN ORGANIZED HEALTH CARE EDUCATION/TRAINING PROGRAM

## 2023-08-04 PROCEDURE — 85007 BL SMEAR W/DIFF WBC COUNT: CPT | Performed by: EMERGENCY MEDICINE

## 2023-08-04 PROCEDURE — 76705 ECHO EXAM OF ABDOMEN: CPT | Mod: 26 | Performed by: STUDENT IN AN ORGANIZED HEALTH CARE EDUCATION/TRAINING PROGRAM

## 2023-08-04 PROCEDURE — 71046 X-RAY EXAM CHEST 2 VIEWS: CPT

## 2023-08-04 PROCEDURE — 76705 ECHO EXAM OF ABDOMEN: CPT

## 2023-08-04 PROCEDURE — 86140 C-REACTIVE PROTEIN: CPT | Performed by: EMERGENCY MEDICINE

## 2023-08-04 RX ORDER — BUSPIRONE HYDROCHLORIDE 15 MG/1
15 TABLET ORAL 2 TIMES DAILY
Status: DISCONTINUED | OUTPATIENT
Start: 2023-08-04 | End: 2023-08-06 | Stop reason: HOSPADM

## 2023-08-04 RX ORDER — PIPERACILLIN SODIUM, TAZOBACTAM SODIUM 3; .375 G/15ML; G/15ML
3.38 INJECTION, POWDER, LYOPHILIZED, FOR SOLUTION INTRAVENOUS ONCE
Status: COMPLETED | OUTPATIENT
Start: 2023-08-04 | End: 2023-08-04

## 2023-08-04 RX ORDER — ACETAMINOPHEN 650 MG/1
650 SUPPOSITORY RECTAL EVERY 6 HOURS PRN
Status: DISCONTINUED | OUTPATIENT
Start: 2023-08-04 | End: 2023-08-05

## 2023-08-04 RX ORDER — POLYETHYLENE GLYCOL 3350 17 G/17G
17 POWDER, FOR SOLUTION ORAL DAILY PRN
Status: DISCONTINUED | OUTPATIENT
Start: 2023-08-04 | End: 2023-08-06 | Stop reason: HOSPADM

## 2023-08-04 RX ORDER — SULFAMETHOXAZOLE/TRIMETHOPRIM 800-160 MG
1 TABLET ORAL EVERY 12 HOURS
Status: ON HOLD | COMMUNITY
Start: 2023-08-04 | End: 2023-08-06

## 2023-08-04 RX ORDER — CLINDAMYCIN PHOSPHATE 10 MG/G
GEL TOPICAL 2 TIMES DAILY PRN
COMMUNITY
Start: 2023-06-21

## 2023-08-04 RX ORDER — TRAZODONE HYDROCHLORIDE 50 MG/1
50 TABLET, FILM COATED ORAL
Status: DISCONTINUED | OUTPATIENT
Start: 2023-08-04 | End: 2023-08-06 | Stop reason: HOSPADM

## 2023-08-04 RX ORDER — VENLAFAXINE HYDROCHLORIDE 75 MG/1
225 CAPSULE, EXTENDED RELEASE ORAL DAILY
Status: DISCONTINUED | OUTPATIENT
Start: 2023-08-05 | End: 2023-08-06 | Stop reason: HOSPADM

## 2023-08-04 RX ORDER — FLUTICASONE FUROATE AND VILANTEROL 200; 25 UG/1; UG/1
1 POWDER RESPIRATORY (INHALATION) DAILY
Status: DISCONTINUED | OUTPATIENT
Start: 2023-08-05 | End: 2023-08-06 | Stop reason: HOSPADM

## 2023-08-04 RX ORDER — ONDANSETRON 4 MG/1
4 TABLET, ORALLY DISINTEGRATING ORAL EVERY 6 HOURS PRN
Status: DISCONTINUED | OUTPATIENT
Start: 2023-08-04 | End: 2023-08-06 | Stop reason: HOSPADM

## 2023-08-04 RX ORDER — LIDOCAINE 40 MG/G
CREAM TOPICAL
Status: DISCONTINUED | OUTPATIENT
Start: 2023-08-04 | End: 2023-08-06 | Stop reason: HOSPADM

## 2023-08-04 RX ORDER — TRETINOIN 1 MG/G
CREAM TOPICAL AT BEDTIME
Status: DISCONTINUED | OUTPATIENT
Start: 2023-08-04 | End: 2023-08-05

## 2023-08-04 RX ORDER — ONDANSETRON 2 MG/ML
4 INJECTION INTRAMUSCULAR; INTRAVENOUS EVERY 6 HOURS PRN
Status: DISCONTINUED | OUTPATIENT
Start: 2023-08-04 | End: 2023-08-06 | Stop reason: HOSPADM

## 2023-08-04 RX ORDER — ACETAMINOPHEN 325 MG/1
650 TABLET ORAL EVERY 6 HOURS PRN
Status: DISCONTINUED | OUTPATIENT
Start: 2023-08-04 | End: 2023-08-06 | Stop reason: HOSPADM

## 2023-08-04 RX ORDER — ENOXAPARIN SODIUM 100 MG/ML
40 INJECTION SUBCUTANEOUS EVERY 24 HOURS
Status: DISCONTINUED | OUTPATIENT
Start: 2023-08-05 | End: 2023-08-05

## 2023-08-04 RX ORDER — MONTELUKAST SODIUM 10 MG/1
10 TABLET ORAL AT BEDTIME
Status: DISCONTINUED | OUTPATIENT
Start: 2023-08-04 | End: 2023-08-06 | Stop reason: HOSPADM

## 2023-08-04 RX ORDER — TRETINOIN 1 MG/G
CREAM TOPICAL AT BEDTIME
COMMUNITY
Start: 2023-06-22

## 2023-08-04 RX ORDER — ALBUTEROL SULFATE 90 UG/1
2 AEROSOL, METERED RESPIRATORY (INHALATION) EVERY 6 HOURS PRN
COMMUNITY

## 2023-08-04 RX ORDER — KETOROLAC TROMETHAMINE 15 MG/ML
15 INJECTION, SOLUTION INTRAMUSCULAR; INTRAVENOUS ONCE
Status: COMPLETED | OUTPATIENT
Start: 2023-08-04 | End: 2023-08-04

## 2023-08-04 RX ADMIN — PIPERACILLIN AND TAZOBACTAM 3.38 G: 3; .375 INJECTION, POWDER, LYOPHILIZED, FOR SOLUTION INTRAVENOUS at 22:54

## 2023-08-04 RX ADMIN — KETOROLAC TROMETHAMINE 15 MG: 15 INJECTION, SOLUTION INTRAMUSCULAR; INTRAVENOUS at 19:42

## 2023-08-04 ASSESSMENT — ACTIVITIES OF DAILY LIVING (ADL)
ADLS_ACUITY_SCORE: 35
ADLS_ACUITY_SCORE: 33

## 2023-08-04 NOTE — ED PROVIDER NOTES
ED Provider Note  [facility]      History     Chief Complaint   Patient presents with     Abnormal Labs     HPI  Barbara Yates is a 42 year old female who presents for abnormal labs.  She has been having fevers every day from 7/30/2023 up until yesterday.  She also noticed that she began to have enlargement of a left anterior cervical lymph node that was tender.  She notes chills, fatigue, generalized body aches.  She took ibuprofen yesterday.  A couple hours later her fever broke.      She was seen in outside emergency department called [facility] 5 days ago and they obtained blood work.  She was called by her primary care to repeat blood work yesterday and then a phone call today told her to come to the emergency department for evaluation of low platelets and low white blood cells.  She was called to come to the ED right away.    She had negative strep test.  She took a COVID test 3 days ago which was negative.  Also notes intermittent lightheadedness over the past week which she has not experienced currently.  She denies any chest pain, shortness of breath, abdominal pain.  She states that she has been positive for mono in the past and this does not feel the same.  She was also diagnosed with urinary tract infection and has been taking Bactrim since yesterday.    Past Medical History  Past Medical History:   Diagnosis Date     Anxiety      Attention deficit disorder with hyperactivity(314.01)      Cervical high risk HPV (human papillomavirus) test positive     11/28/05, 6/14/06, 9/20/21     Depression      Depressive disorder Unknown     H/O colposcopy with cervical biopsy 06/07/2005    ECC- within normal limits. no dysplasia     H/O colposcopy with cervical biopsy 07/13/2006    biopsies and ECC- within normal limits. no dysplasia noted.     Infectious mononucleosis 01/01/2004     LSIL (low grade squamous intraepithelial lesion) on Pap smear 05/06/2005 9/20/21     Past  Surgical History:   Procedure Laterality Date     ARTHROSCOPY KNEE RT/LT Left 09/08/2016     AS CLOSED TX NASAL BONE FRACTURE W/ STABILIZATION  03/01/2016     EXAM OF VAGINA,COLPOSCOPY  2006, 2005     EYE SURGERY  01/01/2008    Lasik eye surgery     ORTHOPEDIC SURGERY  9/8/16 and 12/12/16    knee/tibia     albuterol (PROAIR HFA/PROVENTIL HFA/VENTOLIN HFA) 108 (90 Base) MCG/ACT inhaler  busPIRone (BUSPAR) 15 MG tablet  fexofenadine (ALLEGRA) 180 MG tablet  levonorgestrel (KYLEENA) 19.5 MG IUD  mometasone-formoterol (DULERA) 200-5 MCG/ACT inhaler  montelukast (SINGULAIR) 10 MG tablet  ondansetron (ZOFRAN ODT) 8 MG ODT tab  traZODone (DESYREL) 50 MG tablet  triamcinolone (KENALOG) 0.1 % external ointment  venlafaxine (EFFEXOR XR) 150 MG 24 hr capsule  venlafaxine (EFFEXOR XR) 75 MG 24 hr capsule  venlafaxine (EFFEXOR XR) 75 MG 24 hr capsule      Allergies   Allergen Reactions     Seasonal Allergies      Family History  Family History   Problem Relation Age of Onset     Hypertension Mother      Diabetes Mother      Unknown/Adopted Father      Diabetes Father      Family History Negative Other      Social History   Social History     Tobacco Use     Smoking status: Former     Packs/day: 0.00     Years: 15.00     Pack years: 0.00     Types: Cigarettes     Smokeless tobacco: Never     Tobacco comments:     quit date 8/2010    Vaping Use     Vaping Use: Never used   Substance Use Topics     Alcohol use: Yes     Comment: 0-2 per week      Drug use: No         A medically appropriate review of systems was performed with pertinent positives and negatives noted in the HPI, and all other systems negative.    Physical Exam   BP: 107/74  Pulse: 86  Temp: 98.5  F (36.9  C)  Resp: 17  SpO2: 95 %  Physical Exam  General: no acute distress.  Appears anxious  HENT: Normocephalic and atraumatic. Trachea midline.  Bilateral enlarged anterior cervical lymph nodes, mildly tender on the left.  Normal-appearing posterior oropharynx.  Eyes:  EOMI, conjunctivae normal.  Normal voice.  Cardiovascular:  Normal rate and regular rhythm.   No murmur heard.  Radial pulses 2+ bilaterally.  Pulmonary:  No respiratory distress. Normal breath sounds bilaterally.  Abdominal: no distension.  Abdomen is soft. There is no mass. There is no abdominal tenderness.  Musculoskeletal:    Moving all extremities spontaneously.  No cyanosis, clubbing, or edema.  Skin: Warm, dry, and well perfused. Good turgor.  Neurological:  No focal deficit present.    Psychiatric:   The patient is awake, alert.  Appropriate mood and affect.    ED Course, Procedures, & Data     ED Course as of 08/12/23 1823   Fri Aug 04, 2023   2027 On reevaluation pt is HDS, no current complaints. Denies nausea/vomiting/diarrhea/abdominal pain/rashes/recent travel. Per Heme Onc, GI consulted and pending recs.    2139 Zosyn ordered for febrile neutropenia. GI reporting possible false negative on Monospot test, ordering repeat exam, HIV Ag/Ab testing, INR. Plan to admit for febrile neutropenia to Medicine floors.    Sat Aug 12, 2023   1815 Final Diagnosis: View result by opening the order report     Procedures          No results found for any visits on 08/04/23.  Medications - No data to display  Labs Ordered and Resulted from Time of ED Arrival to Time of ED Departure - No data to display  No orders to display          Critical care was not performed.     Medical Decision Making  The patient's presentation was of moderate complexity (an acute illness with systemic symptoms).    The patient's evaluation involved:  review of external note(s) from 3+ sources (see separate area of note for details)  ordering and/or review of 3+ test(s) in this encounter (see separate area of note for details)  review of 3+ test result(s) ordered prior to this encounter (see separate area of note for details)    The patient's management necessitated further care after sign-out to Dr. Odonnell (see their note for further  management).    Assessment & Plan    Patient presents emergency department for leukopenia seen on outside labs with recurrent fevers after being diagnosed with a viral URI and reactive lymphadenopathy.  On exam, patient is well-appearing and afebrile.  Outside labs were reviewed, revealing leukopenia and neutropenia.      Blood cultures otained.  COVID/influenza, RSV negative.  UA with small leukesterase however 5 squamousu cells.  Mono pending. Chest xray unremarkable for acute infection.  She had new transaminitis, RUQ US pending along with tylenol and hepatitis labs added on.      Patient care signed out to oncoming physician Pending blood peripheral smear, RUQ US, and likely admission for neutropenic fever and transaminitis.    I have reviewed the nursing notes. I have reviewed the findings, diagnosis, plan and need for follow up with the patient.    New Prescriptions    No medications on file       Final diagnoses:   None        Josi Jean MD  08/12/23 2470

## 2023-08-04 NOTE — TELEPHONE ENCOUNTER
Patient advised to be seen in ED today. She will make transportation arrangements, and look at her hospitalization.     Report called to [facility]- [facility]- MANJULA Santana CNP

## 2023-08-04 NOTE — TELEPHONE ENCOUNTER
Patient was seen at [facility] at [facility] in [place] 08/03/2023.  Wondering if you would take a look at her labs and any follow up you would want her to do?    WBC low, platelets low, ferritin high    She is feeling better today than earlier this week, but still not a 100%    Seeing NP on 08/08/2023    Abdiel Taylor, MANOJN, RN, PHN  [facility] ~ Registered Nurse  Clinic Triage ~ [place] [facility] [place]  August 4, 2023

## 2023-08-04 NOTE — CONSULTS
Hematology Consult / Initial Consult Progress Note    Date of Service 08/04/2023  Admit Date 8/4/2023   Initial Consult 08/04/23   Hospital Day: 1     Reason for consult: leukopenia/neutropenia  Consult requested by: Dr. Ted MD (from the ED)    History of Present Illness  Barbara Yates is a 42 year old woman with a history of depression, who was admitted 8/4/2023 for fever for the past 5-6 days and L cervical LN enlargement, found to have leukopenia/neutropenia, thrombocytopenia.     The pt went to OSH ED 4 days ago which showed (7/31/2023 OSH labs):  -covid-19 neg, strep A neg, flu A and B neg  -CRP 0.6  -WBC 4.3, Hb 12.6, plt 189, ANC 3.3    She had a repeat labs at OSH which showed (8/3/2023):  -WBC 1.5, ANC 0.9, Hb 11.8, plt 112  -, , alk phos 270, Tbili 0.4  -ferritin 2081  -Scr 0.77  -U/A WBC 1-2    Due to the neutropenia and thrombocytopenia noted on OSH labs, she was advised to come to the ED.    The pt states that she had had fever until yesterday for the past 5-6 days, Tmax of over 101 at home. Her sxs are a/w generalized body aches, chills. The patient does not report any SOB, CP, abd pain/n/v/d, dysuria, joint pain, rash, no sore throat. She has used APAP and ibuprofen prn, used APAP 3000mg/day and ibuprofen 1600mg/day at the most. Otherwise she only take venlafaxine, trazodone, and prn buspar . No cat at home. She thinks that her neck swelling got better after starting APAP/ibuprofen.     She denies having any major PMH. No cardiac problems, no DM, no cancer, no blood disease.     PMH: none significant  PSH: none  FamHx: No FHx of blood disease or cancer  SocHx: 5 pack-yr smoking hx in the past, social Etoh use, no illicits    Meds reviewed in Epic.  Allergies reviewed in Epic  Comprehensive review of systems performed and otherwise negative unless mentioned above.    Physical Exam  /77   Pulse 77   Temp 98.5  F (36.9  C) (Oral)   Resp 14   SpO2 96%      Constitutional:  Awake, alert, cooperative, in NAD.  Eyes: PERRL  ENT: Normocephalic, mildly enlarged submadibular LAD but no tenderness, no LAD in supraclavicular, axillar or inguinal area.   Respiratory: Non-labored breathing, good air exchange, clear to auscultation bilaterally, no crackles or wheezing.  Cardiovascular: RRR, no murmur noted.  GI: + bowel sounds, soft, non-distended, non-tender, no hepatosplenomegaly.  Skin: No concerning lesions or rash on exposed areas.  Musculoskeletal: No edema viviana LEs.  Neurologic: Awake, alert & oriented.  Psych: appropriate affect        Recent Labs   Lab 08/04/23  1248   WBC 1.7*   HGB 12.3   *   MCV 89   RDW 13.7   ANEU 0.5*   ALYM 1.0   LIZ 0.2   AEOS 0.0     Recent Labs   Lab 08/04/23  1248      POTASSIUM 3.7   CHLORIDE 101   CO2 24   BUN 5.4*   CR 0.83   MARYSE 8.6   MAG 2.1     Recent Labs   Lab 08/04/23  1248   *   *   ALKPHOS 283*   ALBUMIN 4.3   PROTTOTAL 7.3   BILITOTAL 0.3     Today:   Procalcitonin 0.26  ESR 11  CRP 8.9  Mononucleosis screen neg  HCG neg  Flu A and B neg    Pending labs: Hepatitis A, B, C, U/A    Peripheral smear (from our review):   -NL looking plt and RBC  -no schistocytes  -giant plt noted  -large lymphocytes noted    CXR 8/4/2023  IMPRESSION:   Negative chest.    US abd 8/4/2023  IMPRESSION:   1.  Possible tiny gallbladder polyps. Otherwise unremarkable right  upper quadrant ultrasound.  -> NL echotexture of liver, no comment on spleen    Impression:    #Acute fever  #Leukopenia/neutropenia  #Thrombocytopenia  #Cervical LAD    1. The patient presented after having had five days of fever, who was found to have leukopenia, thrombocytopenia and worsening LFTS elevation. In the setting of acute febrile illness it is suspicious that the patient may have a viral illness like EBV, CMV, or other viral a etiologies. Therefore, we suspect that her leukopenia and thrombocytopenia are secondary to her primary illness. Her neck swelling, possible  LAD could be part of this process. Other differentials include but not limited to hematologic disorders (like HLH, given the high ferritin level of 2000s) but we feel it is less likely at this moment. Ferritin can be elevated in the setting of acute inflammation/infection or liver injury as well, and would repeat to trend it.    2. Given the acute and worsening LFTs elevation, we would recommend a GI consult or at least talking to GI.    Recommendations:  -we suspect that her leukopenia/neutropenia and thrombocytopenia are secondary to a primary process (like a viral infection)  -please check CBC/diff daily   -please send/add EBV and CMV PCR  -please send/add ferritin level  -recommend to talk to or to consult GI/hepatology for the LFTs elevation.    We will continue to follow.    The above plan was discussed with Dr. Sorto, who agrees with the assessment and plan.     Thank you for allowing me to participate in the care of this patient. Please do not hesitate to contact me if there are any concerns or questions.     Go MD Jeffrey  Hem/onc fellow  Division of Hematology, Oncology, and Transplantation  [facility]  Pager: [phone]    Attending Note:  I have reviewed the patient chart, and interviewed and examined the patient.  I agree with the assessment and plan. I suspect the neutropenia is secondary to some infectious process, not a primary bone marrow problem. Her complaints of tender neck lymph nodes make them less likely to be lymphoma. Small chance of hemophagocytic lymphohistiocytosis (HLH) with the high ferritin, but most patients with HLH have ferritin > 10,000. More likely the ferritin is elevated related to inflammation, acute liver injury. This might all be a shirley-limiting process, but recommend checking additional labs as mentioned above and opinion from liver specialist.   Monica Sorto MD  Hematology

## 2023-08-04 NOTE — ED TRIAGE NOTES
Patient presents to triage for evaluation of abnormal labs and ongoing fever. Patient has been feeling unwell for a week, has been seen in ED and  and has WBC of 1.5, ferritin 2081, and Plt 112. Patient has been feeling dizzy and has had fever for 5 days.      Triage Assessment       Row Name 08/04/23 1237       Triage Assessment (Adult)    Airway WDL WDL       Respiratory WDL    Respiratory WDL WDL       Skin Circulation/Temperature WDL    Skin Circulation/Temperature WDL WDL       Cardiac WDL    Cardiac WDL WDL       Peripheral/Neurovascular WDL    Peripheral Neurovascular WDL WDL       Cognitive/Neuro/Behavioral WDL    Cognitive/Neuro/Behavioral WDL WDL

## 2023-08-05 ENCOUNTER — APPOINTMENT (OUTPATIENT)
Dept: ULTRASOUND IMAGING | Facility: CLINIC | Age: 43
DRG: 809 | End: 2023-08-05
Attending: INTERNAL MEDICINE
Payer: COMMERCIAL

## 2023-08-05 LAB
ALBUMIN SERPL BCG-MCNC: 3.9 G/DL (ref 3.5–5.2)
ALBUMIN SERPL BCG-MCNC: 4 G/DL (ref 3.5–5.2)
ALP SERPL-CCNC: 236 U/L (ref 35–104)
ALP SERPL-CCNC: 238 U/L (ref 35–104)
ALT SERPL W P-5'-P-CCNC: 654 U/L (ref 0–50)
ALT SERPL W P-5'-P-CCNC: 768 U/L (ref 0–50)
ANION GAP SERPL CALCULATED.3IONS-SCNC: 12 MMOL/L (ref 7–15)
AST SERPL W P-5'-P-CCNC: 540 U/L (ref 0–45)
AST SERPL W P-5'-P-CCNC: 580 U/L (ref 0–45)
ATRIAL RATE - MUSE: 83 BPM
BASOPHILS # BLD MANUAL: 0 10E3/UL (ref 0–0.2)
BASOPHILS # BLD MANUAL: 0 10E3/UL (ref 0–0.2)
BASOPHILS NFR BLD MANUAL: 0 %
BASOPHILS NFR BLD MANUAL: 1 %
BILIRUB DIRECT SERPL-MCNC: <0.2 MG/DL (ref 0–0.3)
BILIRUB SERPL-MCNC: 0.3 MG/DL
BILIRUB SERPL-MCNC: 0.3 MG/DL
BUN SERPL-MCNC: 6.7 MG/DL (ref 6–20)
BURR CELLS BLD QL SMEAR: SLIGHT
BURR CELLS BLD QL SMEAR: SLIGHT
C DIFF TOX B STL QL: NEGATIVE
C PNEUM DNA SPEC QL NAA+PROBE: NOT DETECTED
CALCIUM SERPL-MCNC: 8.3 MG/DL (ref 8.6–10)
CHLORIDE SERPL-SCNC: 106 MMOL/L (ref 98–107)
CMV DNA SPEC NAA+PROBE-ACNC: NOT DETECTED IU/ML
CREAT SERPL-MCNC: 0.83 MG/DL (ref 0.51–0.95)
CRP SERPL-MCNC: 4.8 MG/L
DEPRECATED HCO3 PLAS-SCNC: 21 MMOL/L (ref 22–29)
DIASTOLIC BLOOD PRESSURE - MUSE: NORMAL MMHG
EOSINOPHIL # BLD MANUAL: 0 10E3/UL (ref 0–0.7)
EOSINOPHIL # BLD MANUAL: 0 10E3/UL (ref 0–0.7)
EOSINOPHIL NFR BLD MANUAL: 0 %
EOSINOPHIL NFR BLD MANUAL: 1 %
ERYTHROCYTE [DISTWIDTH] IN BLOOD BY AUTOMATED COUNT: 13.5 % (ref 10–15)
ERYTHROCYTE [DISTWIDTH] IN BLOOD BY AUTOMATED COUNT: 13.8 % (ref 10–15)
FERRITIN SERPL-MCNC: 2880 NG/ML (ref 6–175)
FLUAV H1 2009 PAND RNA SPEC QL NAA+PROBE: NOT DETECTED
FLUAV H1 RNA SPEC QL NAA+PROBE: NOT DETECTED
FLUAV H3 RNA SPEC QL NAA+PROBE: NOT DETECTED
FLUAV RNA SPEC QL NAA+PROBE: NOT DETECTED
FLUBV RNA SPEC QL NAA+PROBE: NOT DETECTED
GFR SERPL CREATININE-BSD FRML MDRD: 90 ML/MIN/1.73M2
GLUCOSE SERPL-MCNC: 78 MG/DL (ref 70–99)
HADV DNA SPEC QL NAA+PROBE: NOT DETECTED
HAV IGM SERPL QL IA: NONREACTIVE
HBV CORE AB SERPL QL IA: NONREACTIVE
HBV SURFACE AB SERPL IA-ACNC: 153.08 M[IU]/ML
HBV SURFACE AB SERPL IA-ACNC: REACTIVE M[IU]/ML
HBV SURFACE AG SERPL QL IA: NONREACTIVE
HCOV PNL SPEC NAA+PROBE: NOT DETECTED
HCT VFR BLD AUTO: 35.9 % (ref 35–47)
HCT VFR BLD AUTO: 36.3 % (ref 35–47)
HCV AB SERPL QL IA: NONREACTIVE
HGB BLD-MCNC: 11.6 G/DL (ref 11.7–15.7)
HGB BLD-MCNC: 11.8 G/DL (ref 11.7–15.7)
HIV 1+2 AB+HIV1 P24 AG SERPL QL IA: NONREACTIVE
HMPV RNA SPEC QL NAA+PROBE: NOT DETECTED
HOLD SPECIMEN: NORMAL
HPIV1 RNA SPEC QL NAA+PROBE: NOT DETECTED
HPIV2 RNA SPEC QL NAA+PROBE: NOT DETECTED
HPIV3 RNA SPEC QL NAA+PROBE: NOT DETECTED
HPIV4 RNA SPEC QL NAA+PROBE: NOT DETECTED
INTERPRETATION ECG - MUSE: NORMAL
LDH SERPL L TO P-CCNC: 583 U/L (ref 0–250)
LYMPHOCYTES # BLD MANUAL: 1 10E3/UL (ref 0.8–5.3)
LYMPHOCYTES # BLD MANUAL: 1.4 10E3/UL (ref 0.8–5.3)
LYMPHOCYTES NFR BLD MANUAL: 50 %
LYMPHOCYTES NFR BLD MANUAL: 67 %
M PNEUMO DNA SPEC QL NAA+PROBE: NOT DETECTED
MCH RBC QN AUTO: 29.2 PG (ref 26.5–33)
MCH RBC QN AUTO: 29.9 PG (ref 26.5–33)
MCHC RBC AUTO-ENTMCNC: 32 G/DL (ref 31.5–36.5)
MCHC RBC AUTO-ENTMCNC: 32.9 G/DL (ref 31.5–36.5)
MCV RBC AUTO: 91 FL (ref 78–100)
MCV RBC AUTO: 91 FL (ref 78–100)
MONOCYTES # BLD MANUAL: 0.2 10E3/UL (ref 0–1.3)
MONOCYTES # BLD MANUAL: 0.2 10E3/UL (ref 0–1.3)
MONOCYTES NFR BLD MANUAL: 10 %
MONOCYTES NFR BLD MANUAL: 8 %
NEUTROPHILS # BLD MANUAL: 0.5 10E3/UL (ref 1.6–8.3)
NEUTROPHILS # BLD MANUAL: 0.8 10E3/UL (ref 1.6–8.3)
NEUTROPHILS NFR BLD MANUAL: 22 %
NEUTROPHILS NFR BLD MANUAL: 41 %
P AXIS - MUSE: 40 DEGREES
PLAT MORPH BLD: ABNORMAL
PLAT MORPH BLD: ABNORMAL
PLATELET # BLD AUTO: 134 10E3/UL (ref 150–450)
PLATELET # BLD AUTO: 150 10E3/UL (ref 150–450)
POTASSIUM SERPL-SCNC: 4.4 MMOL/L (ref 3.4–5.3)
PR INTERVAL - MUSE: 140 MS
PROT SERPL-MCNC: 6.6 G/DL (ref 6.4–8.3)
PROT SERPL-MCNC: 6.9 G/DL (ref 6.4–8.3)
QRS DURATION - MUSE: 88 MS
QT - MUSE: 380 MS
QTC - MUSE: 446 MS
R AXIS - MUSE: 68 DEGREES
RBC # BLD AUTO: 3.95 10E6/UL (ref 3.8–5.2)
RBC # BLD AUTO: 3.97 10E6/UL (ref 3.8–5.2)
RBC MORPH BLD: ABNORMAL
RBC MORPH BLD: ABNORMAL
RETICS # AUTO: 0.02 10E6/UL (ref 0.03–0.1)
RETICS # AUTO: 0.02 10E6/UL (ref 0.03–0.1)
RETICS/RBC NFR AUTO: 0.4 % (ref 0.5–2)
RETICS/RBC NFR AUTO: 0.5 % (ref 0.5–2)
RSV RNA SPEC QL NAA+PROBE: NOT DETECTED
RSV RNA SPEC QL NAA+PROBE: NOT DETECTED
RV+EV RNA SPEC QL NAA+PROBE: NOT DETECTED
SODIUM SERPL-SCNC: 139 MMOL/L (ref 136–145)
SYSTOLIC BLOOD PRESSURE - MUSE: NORMAL MMHG
T AXIS - MUSE: 20 DEGREES
VENTRICULAR RATE- MUSE: 83 BPM
WBC # BLD AUTO: 2 10E3/UL (ref 4–11)
WBC # BLD AUTO: 2.1 10E3/UL (ref 4–11)

## 2023-08-05 PROCEDURE — 87015 SPECIMEN INFECT AGNT CONCNTJ: CPT | Performed by: INTERNAL MEDICINE

## 2023-08-05 PROCEDURE — 250N000011 HC RX IP 250 OP 636: Mod: JZ

## 2023-08-05 PROCEDURE — 85007 BL SMEAR W/DIFF WBC COUNT: CPT | Performed by: INTERNAL MEDICINE

## 2023-08-05 PROCEDURE — 93975 VASCULAR STUDY: CPT | Mod: 26 | Performed by: RADIOLOGY

## 2023-08-05 PROCEDURE — 87207 SMEAR SPECIAL STAIN: CPT | Performed by: INTERNAL MEDICINE

## 2023-08-05 PROCEDURE — 85060 BLOOD SMEAR INTERPRETATION: CPT | Performed by: PATHOLOGY

## 2023-08-05 PROCEDURE — 93975 VASCULAR STUDY: CPT

## 2023-08-05 PROCEDURE — 87799 DETECT AGENT NOS DNA QUANT: CPT

## 2023-08-05 PROCEDURE — 86140 C-REACTIVE PROTEIN: CPT

## 2023-08-05 PROCEDURE — 36415 COLL VENOUS BLD VENIPUNCTURE: CPT

## 2023-08-05 PROCEDURE — 258N000003 HC RX IP 258 OP 636

## 2023-08-05 PROCEDURE — 250N000013 HC RX MED GY IP 250 OP 250 PS 637

## 2023-08-05 PROCEDURE — 83615 LACTATE (LD) (LDH) ENZYME: CPT | Performed by: INTERNAL MEDICINE

## 2023-08-05 PROCEDURE — 85045 AUTOMATED RETICULOCYTE COUNT: CPT | Performed by: INTERNAL MEDICINE

## 2023-08-05 PROCEDURE — 99232 SBSQ HOSP IP/OBS MODERATE 35: CPT | Mod: GC | Performed by: INTERNAL MEDICINE

## 2023-08-05 PROCEDURE — 86790 VIRUS ANTIBODY NOS: CPT

## 2023-08-05 PROCEDURE — 99222 1ST HOSP IP/OBS MODERATE 55: CPT | Mod: GC | Performed by: STUDENT IN AN ORGANIZED HEALTH CARE EDUCATION/TRAINING PROGRAM

## 2023-08-05 PROCEDURE — 82248 BILIRUBIN DIRECT: CPT

## 2023-08-05 PROCEDURE — 36415 COLL VENOUS BLD VENIPUNCTURE: CPT | Performed by: INTERNAL MEDICINE

## 2023-08-05 PROCEDURE — 99233 SBSQ HOSP IP/OBS HIGH 50: CPT | Mod: GC | Performed by: INTERNAL MEDICINE

## 2023-08-05 PROCEDURE — 87486 CHLMYD PNEUM DNA AMP PROBE: CPT | Performed by: INTERNAL MEDICINE

## 2023-08-05 PROCEDURE — 85027 COMPLETE CBC AUTOMATED: CPT

## 2023-08-05 PROCEDURE — 85007 BL SMEAR W/DIFF WBC COUNT: CPT

## 2023-08-05 PROCEDURE — 120N000005 HC R&B MS OVERFLOW UMMC

## 2023-08-05 PROCEDURE — 86015 ACTIN ANTIBODY EACH: CPT

## 2023-08-05 PROCEDURE — 87633 RESP VIRUS 12-25 TARGETS: CPT | Performed by: INTERNAL MEDICINE

## 2023-08-05 PROCEDURE — 87493 C DIFF AMPLIFIED PROBE: CPT | Performed by: INTERNAL MEDICINE

## 2023-08-05 PROCEDURE — 86038 ANTINUCLEAR ANTIBODIES: CPT

## 2023-08-05 PROCEDURE — 82728 ASSAY OF FERRITIN: CPT

## 2023-08-05 PROCEDURE — 85027 COMPLETE CBC AUTOMATED: CPT | Performed by: INTERNAL MEDICINE

## 2023-08-05 RX ORDER — LORAZEPAM 0.5 MG/1
0.5 TABLET ORAL ONCE
Status: DISCONTINUED | OUTPATIENT
Start: 2023-08-05 | End: 2023-08-06

## 2023-08-05 RX ORDER — PIPERACILLIN SODIUM, TAZOBACTAM SODIUM 3; .375 G/15ML; G/15ML
3.38 INJECTION, POWDER, LYOPHILIZED, FOR SOLUTION INTRAVENOUS EVERY 6 HOURS
Status: DISCONTINUED | OUTPATIENT
Start: 2023-08-05 | End: 2023-08-06

## 2023-08-05 RX ORDER — IBUPROFEN 200 MG
200 TABLET ORAL EVERY 6 HOURS PRN
Status: DISCONTINUED | OUTPATIENT
Start: 2023-08-05 | End: 2023-08-06 | Stop reason: HOSPADM

## 2023-08-05 RX ADMIN — IBUPROFEN 200 MG: 200 TABLET, FILM COATED ORAL at 21:34

## 2023-08-05 RX ADMIN — MONTELUKAST 10 MG: 10 TABLET, FILM COATED ORAL at 00:27

## 2023-08-05 RX ADMIN — BUSPIRONE HYDROCHLORIDE 15 MG: 15 TABLET ORAL at 11:51

## 2023-08-05 RX ADMIN — PIPERACILLIN AND TAZOBACTAM 3.38 G: 3; .375 INJECTION, POWDER, LYOPHILIZED, FOR SOLUTION INTRAVENOUS at 04:55

## 2023-08-05 RX ADMIN — BUSPIRONE HYDROCHLORIDE 15 MG: 15 TABLET ORAL at 00:27

## 2023-08-05 RX ADMIN — PIPERACILLIN AND TAZOBACTAM 3.38 G: 3; .375 INJECTION, POWDER, LYOPHILIZED, FOR SOLUTION INTRAVENOUS at 17:49

## 2023-08-05 RX ADMIN — MONTELUKAST 10 MG: 10 TABLET, FILM COATED ORAL at 22:55

## 2023-08-05 RX ADMIN — FLUTICASONE FUROATE AND VILANTEROL TRIFENATATE 1 PUFF: 200; 25 POWDER RESPIRATORY (INHALATION) at 11:52

## 2023-08-05 RX ADMIN — ACETYLCYSTEINE 12880 MG: 200 INJECTION, SOLUTION INTRAVENOUS at 20:20

## 2023-08-05 RX ADMIN — TRAZODONE HYDROCHLORIDE 50 MG: 50 TABLET ORAL at 20:19

## 2023-08-05 RX ADMIN — PIPERACILLIN AND TAZOBACTAM 3.38 G: 3; .375 INJECTION, POWDER, LYOPHILIZED, FOR SOLUTION INTRAVENOUS at 11:51

## 2023-08-05 RX ADMIN — BUSPIRONE HYDROCHLORIDE 15 MG: 15 TABLET ORAL at 20:19

## 2023-08-05 ASSESSMENT — ACTIVITIES OF DAILY LIVING (ADL)
CHANGE_IN_FUNCTIONAL_STATUS_SINCE_ONSET_OF_CURRENT_ILLNESS/INJURY: NO
ADLS_ACUITY_SCORE: 19
ADLS_ACUITY_SCORE: 35
ADLS_ACUITY_SCORE: 19
HEARING_DIFFICULTY_OR_DEAF: NO
ADLS_ACUITY_SCORE: 19
ADLS_ACUITY_SCORE: 35
DIFFICULTY_COMMUNICATING: NO
ADLS_ACUITY_SCORE: 35
FALL_HISTORY_WITHIN_LAST_SIX_MONTHS: NO
WEAR_GLASSES_OR_BLIND: YES
BATHING: 0-->INDEPENDENT
ADLS_ACUITY_SCORE: 35
ADLS_ACUITY_SCORE: 35
CONCENTRATING,_REMEMBERING_OR_MAKING_DECISIONS_DIFFICULTY: NO
DRESS: 0-->INDEPENDENT
ADLS_ACUITY_SCORE: 35
DRESSING/BATHING_DIFFICULTY: NO
WALKING_OR_CLIMBING_STAIRS_DIFFICULTY: NO
ADLS_ACUITY_SCORE: 19
ADLS_ACUITY_SCORE: 19
TOILETING_ISSUES: NO
DOING_ERRANDS_INDEPENDENTLY_DIFFICULTY: YES
DRESS: 0-->INDEPENDENT
ADLS_ACUITY_SCORE: 19
DIFFICULTY_EATING/SWALLOWING: NO

## 2023-08-05 NOTE — PROGRESS NOTES
Admitted/transferred from: [facility] ER  2 RN full   skin assessment completed by Taz Graff, RN and Michelle SOLIZ RN  Skin assessment finding: no skin issues found  Interventions/actions:     Will continue to monitor.    A&Ox4. AVSS. Pt with elevated AST, ALT and Ferritin.Pt reported headache relief with ice pack  Respiratory panel PCR came negative. Cdiff came back negative.  Up ad noris to bathroom.Fair appetite. Hard BM x1. Voids spontaneously.  Continue with POC
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
## 2023-08-05 NOTE — H&P
[facility] History and Physical  Barbara Yates MRN# [id number]   Age: 42 year old YOB: 1980     Date of Admission: 8/4/2023    Primary care provider: Nicole Soto          Assessment and Plan:   This is a 42 year old female with PmHx of depression and anxiety presenting for recent fevers and leukopenia.     #Neutropenic Fever   #Transaminitis   #Cervical Lymphadenopathy   Differential diagnosis for neutropenic fever includes infection, medication, hematologic malignancy, or rheumatologic disorder. Hematology consulted and suspect secondary to an infectious process such as viral infection ( EBV, CMV, etc). Felt, not likely due to primary bone marrow problem. Bactrim is associated with agranulocytosis, so will discontinue.   -Discontinue Bactrim   -Zosyn in neutropenic patient. We considered adding Cefepime for complete coverage in neutropenic fever; however, clinical signs and symptoms are not pointing to bacterial infection. Patient is improving. Will monitor and can consider further abx in the morning.   - CMV, EBV, Hepatitis and HIV pending   - GI consulted for transaminitis per Hematology recommendations     #Depression   #Anxiety   Continue PTA Venlafaxine, Buspirone, and Trazodone       FEN: Regular diet   Lines: IV   DVT: Enoxaparin   Code status: Full Code   Dispo: Admission inpatient general medicine     Clinical decision making discussed with Dr. Croft who is in agreement with the above plan.     Elbert Bryant MD  Internal Medicine Resident, PGY-1   [phone]            Chief Complaint:     Recent fevers, abnormal labs      History is obtained from the patient          History of Present Illness:   This patient is a 42 year old female with a history of depression and anxiety who presents with recent fevers and leukopenia. The patient reports that she first became ill about 7 days ago. She felt very fatigued, had body aches, and lost her appetite. She had fevers,  chills, and sweats. She also noticed a tender swollen lymph node on the left side of her neck. She presented to the ED for evaluation. She was told she likely had a viral URI infection with reactive lymphadenopathy and was sent home. She continued to have fevers and feel poorly. She took alternating Tylenol and Advil which helped the fevers. Two days ago she developed urinary frequency, and was seen at urgent care and treated for UTI with Bactrim. Her fever broke yesterday, and she has started to feel better. The LN to her neck is no longer painful. However, repeat lab work showed worsening WBC count and liver enzymes, so she was told to present to the ED.     The patient denies any sick contacts or recent travel. She denies new sexual partners or concern for STI.           Past Medical History:     Past Medical History:   Diagnosis Date    Anxiety     Attention deficit disorder with hyperactivity(314.01)     Cervical high risk HPV (human papillomavirus) test positive     11/28/05, 6/14/06, 9/20/21    Depression     Depressive disorder Unknown    H/O colposcopy with cervical biopsy 06/07/2005    ECC- within normal limits. no dysplasia    H/O colposcopy with cervical biopsy 07/13/2006    biopsies and ECC- within normal limits. no dysplasia noted.    Infectious mononucleosis 01/01/2004    LSIL (low grade squamous intraepithelial lesion) on Pap smear 05/06/2005 9/20/21             Past Surgical History:      Past Surgical History:   Procedure Laterality Date    ARTHROSCOPY KNEE RT/LT Left 09/08/2016    AS CLOSED TX NASAL BONE FRACTURE W/ STABILIZATION  03/01/2016    EXAM OF VAGINA,COLPOSCOPY  2006, 2005    EYE SURGERY  01/01/2008    Lasik eye surgery    ORTHOPEDIC SURGERY  9/8/16 and 12/12/16    knee/tibia             Social History:   The patient has an 11-year-old son.  She works as an .  She has a past history of smoking over 10 years ago.  She drinks alcohol on occasion.  She denies any drug  use.  She is currently sexually active.  No new sexual partners.  No history of STIs.  No recent travel.         Family History:     Family History   Problem Relation Age of Onset    Hypertension Mother     Diabetes Mother     Unknown/Adopted Father     Diabetes Father              No family history of leukemia, lymphoma, or blood disorders.         Allergies:   Seasonal allergies.         Medications:   Current Outpatient Medications:     albuterol (PROAIR HFA/PROVENTIL HFA/VENTOLIN HFA) 108 (90 Base) MCG/ACT inhaler, Inhale 2 puffs into the lungs every 6 hours as needed for shortness of breath, wheezing or cough, Disp: , Rfl:     busPIRone (BUSPAR) 15 MG tablet, TAKE 1 TABLET TWICE A DAY, Disp: 180 tablet, Rfl: 3    clindamycin (CLINDAMAX) 1 % external gel, Apply topically 2 times daily as needed, Disp: , Rfl:     levonorgestrel (KYLEENA) 19.5 MG IUD, 1 each (19.5 mg) by Intrauterine route once for 1 dose, Disp: 1 each, Rfl: 0    mometasone-formoterol (DULERA) 200-5 MCG/ACT inhaler, USE 2 INHALATIONS TWICE A DAY (NEED TO BE SEEN FOR FURTHER REFILLS), Disp: 13 g, Rfl: 11    montelukast (SINGULAIR) 10 MG tablet, Take 1 tablet (10 mg) by mouth At Bedtime, Disp: 90 tablet, Rfl: 2    ondansetron (ZOFRAN ODT) 8 MG ODT tab, Take 1 tablet (8 mg) by mouth every 8 hours as needed for nausea, Disp: 30 tablet, Rfl: 0    sulfamethoxazole-trimethoprim (BACTRIM DS) 800-160 MG tablet, Take 1 tablet by mouth every 12 hours, Disp: , Rfl:     traZODone (DESYREL) 50 MG tablet, TAKE 1 TABLET AT BEDTIME AS NEEDED  Strength: 50 mg, Disp: 90 tablet, Rfl: 3    tretinoin (RETIN-A) 0.1 % external cream, Apply topically At Bedtime, Disp: , Rfl:     venlafaxine (EFFEXOR XR) 75 MG 24 hr capsule, Take 3 capsules (225 mg) by mouth daily, Disp: 270 capsule, Rfl: 1           Review of Systems:   Review Of Systems    General: Weight gain 10-15lbs since beginning of year. Fevers, chills, and sweats.   Skin: No new rashes or concerning lesions.      Eyes: negative for visual blurring, double vision, eye pain  Ears/Nose/Throat: negative for nasal congestion, postnasal drainage, sinus trouble  Respiratory: No shortness of breath, dyspnea on exertion, cough, or hemoptysis  Cardiovascular: No palpitations or chest pain  Gastrointestinal: No nausea, vomiting, constipation, and diarrhea  Genitourinary: Urinary frequency, improving.  No dysuria or hematuria.  Musculoskeletal: Body aches.  No new joint pain.  Hematologic: No history of bleeding or bruise issues.  Neurologic: Mild headache currently.  No dizziness, numbness, weakness         Physical Exam:   Vitals were reviewed  /71 (BP Location: Left arm)   Pulse 78   Temp 98.3  F (36.8  C) (Oral)   Resp 16   SpO2 97%     Exam:  General: the patient is pleasant, resting comfortably, no acute distress.   HEENT: Moist mucous membranes.  No erythema to posterior oropharynx.  No tonsillar hypertrophy.  Small submandibular palpable lymph node on the left.    Lungs: Clear to auscultation, no wheezes or crackles.  Pittore effort  CV: RRR, nl s1 and s2, no m/r/g.   Abdomen: Soft, nondistended, nontender. No rebound or guarding. Bowel sounds active.  Extremities: No lower extremity edema. Peripheral pulses strong and symmetric.   Skin: no appreciable skin lesions/rashes on exposed skin.   Neuro: Alert and oriented x4, grossly normal neurologic exam.  Psych: normal affect, answering questions appropriately. No obvious depression or anxiety.           Data:   Labs:   Results for orders placed or performed during the hospital encounter of 08/04/23 (from the past 24 hour(s))   CBC with platelets differential    Narrative    The following orders were created for panel order CBC with platelets differential.  Procedure                               Abnormality         Status                     ---------                               -----------         ------                     CBC with platelets and d...[[id number]]   Abnormal            Final result               Manual Differential[[id number]]          Abnormal            Final result                 Please view results for these tests on the individual orders.   Comprehensive metabolic panel   Result Value Ref Range    Sodium 136 136 - 145 mmol/L    Potassium 3.7 3.4 - 5.3 mmol/L    Chloride 101 98 - 107 mmol/L    Carbon Dioxide (CO2) 24 22 - 29 mmol/L    Anion Gap 11 7 - 15 mmol/L    Urea Nitrogen 5.4 (L) 6.0 - 20.0 mg/dL    Creatinine 0.83 0.51 - 0.95 mg/dL    Calcium 8.6 8.6 - 10.0 mg/dL    Glucose 90 70 - 99 mg/dL    Alkaline Phosphatase 283 (H) 35 - 104 U/L     (H) 0 - 45 U/L     (HH) 0 - 50 U/L    Protein Total 7.3 6.4 - 8.3 g/dL    Albumin 4.3 3.5 - 5.2 g/dL    Bilirubin Total 0.3 <=1.2 mg/dL    GFR Estimate 90 >60 mL/min/1.73m2   Magnesium   Result Value Ref Range    Magnesium 2.1 1.7 - 2.3 mg/dL   Procalcitonin   Result Value Ref Range    Procalcitonin 0.26 (H) <0.05 ng/mL   Erythrocyte sedimentation rate auto   Result Value Ref Range    Erythrocyte Sedimentation Rate 11 0 - 20 mm/hr   CRP inflammation   Result Value Ref Range    CRP Inflammation 8.93 (H) <5.00 mg/L   Mononucleosis screen   Result Value Ref Range    Mononucleosis Screen Negative Negative   CBC with platelets and differential   Result Value Ref Range    WBC Count 1.7 (L) 4.0 - 11.0 10e3/uL    RBC Count 4.20 3.80 - 5.20 10e6/uL    Hemoglobin 12.3 11.7 - 15.7 g/dL    Hematocrit 37.4 35.0 - 47.0 %    MCV 89 78 - 100 fL    MCH 29.3 26.5 - 33.0 pg    MCHC 32.9 31.5 - 36.5 g/dL    RDW 13.7 10.0 - 15.0 %    Platelet Count 130 (L) 150 - 450 10e3/uL   Manual Differential   Result Value Ref Range    % Neutrophils 27 %    % Lymphocytes 60 %    % Monocytes 12 %    % Eosinophils 1 %    % Basophils 0 %    Absolute Neutrophils 0.5 (L) 1.6 - 8.3 10e3/uL    Absolute Lymphocytes 1.0 0.8 - 5.3 10e3/uL    Absolute Monocytes 0.2 0.0 - 1.3 10e3/uL    Absolute Eosinophils 0.0 0.0 - 0.7 10e3/uL    Absolute  Basophils 0.0 0.0 - 0.2 10e3/uL    RBC Morphology Confirmed RBC Indices     Platelet Assessment  Automated Count Confirmed. Platelet morphology is normal.     Automated Count Confirmed. Platelet morphology is normal.   Symptomatic Influenza A/B, RSV, & SARS-CoV2 PCR (COVID-19) Nasopharyngeal    Specimen: Nasopharyngeal; Swab   Result Value Ref Range    Influenza A PCR Negative Negative    Influenza B PCR Negative Negative    RSV PCR Negative Negative    SARS CoV2 PCR Negative Negative           HCG qualitative urine   Result Value Ref Range    hCG Urine Qualitative Negative Negative   UA with Microscopic reflex to Culture    Specimen: Urine, Clean Catch   Result Value Ref Range    Color Urine Yellow Colorless, Straw, Light Yellow, Yellow    Appearance Urine Clear Clear    Glucose Urine Negative Negative mg/dL    Bilirubin Urine Negative Negative    Ketones Urine Negative Negative mg/dL    Specific Gravity Urine 1.011 1.003 - 1.035    Blood Urine Negative Negative    pH Urine 7.0 5.0 - 7.0    Protein Albumin Urine Negative Negative mg/dL    Urobilinogen Urine 2.0 Normal, 2.0 mg/dL    Nitrite Urine Negative Negative    Leukocyte Esterase Urine Small (A) Negative    RBC Urine 1 <=2 /HPF    WBC Urine 3 <=5 /HPF    Squamous Epithelials Urine 5 (H) <=1 /HPF    Narrative    Urine Culture not indicated   EKG 12-lead, tracing only   Result Value Ref Range    Systolic Blood Pressure  mmHg    Diastolic Blood Pressure  mmHg    Ventricular Rate 83 BPM    Atrial Rate 83 BPM    MI Interval 140 ms    QRS Duration 88 ms     ms    QTc 446 ms    P Axis 40 degrees    R AXIS 68 degrees    T Axis 20 degrees    Interpretation ECG       Sinus rhythm  Possible Left atrial enlargement  Nonspecific T wave abnormality  Abnormal ECG     XR Chest 2 Views    Narrative    EXAM: XR CHEST 2 VIEWS 8/4/2023 1:22 PM    DEMOGRAPHICS: Female of 42 years    INDICATION: fevers x 5 days, chills    COMPARISON: 6/25/2018    TECHNIQUE: Upright PA and  lateral views of the chest.    FINDINGS:   Lines and tubes Trachea is midline. Normal cardiac silhouette.  Mediastinum is within normal limits. Distinct pulmonary vasculature.  No significant pleural effusion. No discernable pneumothorax. No focal  airspace opacities. Unremarkable upper abdomen. No acute or suspicious  osseous abnormalities. Unremarkable soft tissues.      Impression    IMPRESSION:   Negative chest.    I have personally reviewed the examination and initial interpretation  and I agree with the findings.    MARINA SANCHEZ MD         SYSTEM ID:  [id number]   Abdomen US, limited (RUQ only)    Narrative    EXAMINATION: Limited Abdominal Ultrasound, 8/4/2023 3:21 PM     COMPARISON: None.    HISTORY: Transaminitis, chills, neutropenia    FINDINGS:   Fluid: No evidence of ascites or pleural effusions.    Liver: The liver demonstrates normal echotexture, measuring 12.8 cm in  craniocaudal dimension. There is no focal mass.     Gallbladder: There is no wall thickening, pericholecystic fluid, or  evidence for cholelithiasis. Tiny echogenic foci extending from the  wall of the gallbladder may represent small polyps.    Bile Ducts: Both the intra- and extrahepatic biliary system are of  normal caliber.  The common bile duct measures 3.5 mm in diameter.    Pancreas: Visualized portions of the head and body of the pancreas are  unremarkable.     Kidney: The right kidney measures 11.4 cm long. There is no  hydronephrosis or hydroureter, no shadowing renal calculi, cystic  lesion or mass.       Impression    IMPRESSION:   1.  Possible tiny gallbladder polyps. Otherwise unremarkable right  upper quadrant ultrasound.    I have personally reviewed the examination and initial interpretation  and I agree with the findings.    SRI CHO DO         SYSTEM ID:  [id number]   Acetaminophen level   Result Value Ref Range    Acetaminophen <5.0 (L) 10.0 - 30.0 ug/mL   Ferritin   Result Value Ref Range    Ferritin 2,785 (H) 6  - 175 ng/mL   INR   Result Value Ref Range    INR 0.99 0.85 - 1.15

## 2023-08-05 NOTE — MEDICATION SCRIBE - ADMISSION MEDICATION HISTORY
Medication Scribe Admission Medication History    Admission medication history is complete. The information provided in this note is only as accurate as the sources available at the time of the update.    Medication reconciliation/reorder completed by provider prior to medication history? No    Information Source(s): Patient via in-person    Pertinent Information: None    Changes made to PTA medication list:  Added: Clindamycin 1%, Bactrim -160 mg, Tretinoin 0.1%  Deleted: Fexofenadine 180 mg, Triamcinolone 0.1%, Venlafaxine 150 mg, Venlafaxine 75 mg (1 capsule)  Changed: None    Medication Affordability:  Not including over the counter (OTC) medications, was there a time in the past 3 months when you did not take your medications as prescribed because of cost?: No    Allergies reviewed with patient and updates made in EHR: yes    Medication History Completed By: Carina Cono 8/4/2023 9:58 PM    Prior to Admission medications    Medication Sig Last Dose Taking? Auth Provider Long Term End Date   albuterol (PROAIR HFA/PROVENTIL HFA/VENTOLIN HFA) 108 (90 Base) MCG/ACT inhaler Inhale 2 puffs into the lungs every 6 hours as needed for shortness of breath, wheezing or cough Past Month at unknown Yes Reported, Patient No    busPIRone (BUSPAR) 15 MG tablet TAKE 1 TABLET TWICE A DAY Past Week at unknown Yes Nicole Soto APRN CNP Yes    clindamycin (CLINDAMAX) 1 % external gel Apply topically 2 times daily as needed Past Week at unknown Yes Reported, Patient     levonorgestrel (KYLEENA) 19.5 MG IUD 1 each (19.5 mg) by Intrauterine route once for 1 dose  Yes Nicole Soto APRN CNP Yes    mometasone-formoterol (DULERA) 200-5 MCG/ACT inhaler USE 2 INHALATIONS TWICE A DAY (NEED TO BE SEEN FOR FURTHER REFILLS) 8/4/2023 at am Yes Nicole Soto APRN CNP Yes    montelukast (SINGULAIR) 10 MG tablet Take 1 tablet (10 mg) by mouth At Bedtime Past Month at unknown Yes Nicole Soto APRN CNP Yes    ondansetron (ZOFRAN ODT) 8  MG ODT tab Take 1 tablet (8 mg) by mouth every 8 hours as needed for nausea 8/3/2023 at unknown Yes Moni Reaves MD     sulfamethoxazole-trimethoprim (BACTRIM DS) 800-160 MG tablet Take 1 tablet by mouth every 12 hours 8/4/2023 at 2115 Yes Reported, Patient  8/7/23   traZODone (DESYREL) 50 MG tablet TAKE 1 TABLET AT BEDTIME AS NEEDED  Strength: 50 mg 8/3/2023 at pm Yes Nicole Soto APRN CNP Yes    tretinoin (RETIN-A) 0.1 % external cream Apply topically At Bedtime 8/3/2023 at pm Yes Reported, Patient     venlafaxine (EFFEXOR XR) 75 MG 24 hr capsule Take 3 capsules (225 mg) by mouth daily 8/3/2023 at pm Yes Nicole Soto APRN CNP Yes

## 2023-08-05 NOTE — PROGRESS NOTES
Pt arrived to  via a litter direct from  ER.  A&Ox4.AVSS.Pt is oriented to room and how to use call light. [facility] team is notified of pt's arrival to .

## 2023-08-05 NOTE — PLAN OF CARE
/71 (BP Location: Left arm)   Pulse 78   Temp 98.3  F (36.8  C) (Oral)   Resp 16   SpO2 97%    Neuro: A&Ox4.   Cardiac: Afebrile SR. VSS.   Respiratory: Sating >95% on RA.  GI/: Adequate urine output. No BM this shift   Diet/appetite: Tolerating reg diet.   Activity:  Independent   Pain: At acceptable level on current regimen. C/O headache. Ice packs given with moderate relief.  Skin: No new deficits noted.  LDA's:     Plan: Continue with POC. Notify primary team with changes.   Goal Outcome Evaluation:      Plan of Care Reviewed With: patient    Overall Patient Progress: improvingOverall Patient Progress: improving

## 2023-08-05 NOTE — PHARMACY
"The following home medications were NOT continued on inpatient admission per \"Discontinuation of nonessential home medications during hospitalization\" policy: tretinoin (RETIN-A) 0.1 % cream     If a therapeutic holiday is deemed inappropriate per the prescriber, please notify the pharmacist regarding the medication order.    The pharmacist is available to answer any questions and/or concerns the patient may have regarding discontinuation of non-essential medications.    Please ensure that these medications are restarted as needed upon discharge via the medication reconciliation discharge process and included on the discharge medication reconciliation report.    Thank you,  David Wen, [facility]    "

## 2023-08-05 NOTE — PROGRESS NOTES
Hematology Consult Progress Note    Date of Service 08/05/2023  Admit Date 8/4/2023   Initial Consult 08/04/2023  Hospital Day: 2     Barbara Yates is a 42 year old woman with a history of depression, who was admitted 8/4/2023 for fever for the past 5-6 days and L cervical LN enlargement, found to have leukopenia/neutropenia, thrombocytopenia, in the setting of febrile illness and acute liver injury.     Interval Events:   Feeling better. No specific symptoms. Her neck are not painful anymore, may be still a little tender when pushed.     No fever overnight.     Meds reviewed in Epic.  Allergies reviewed in Epic  Comprehensive review of systems performed and otherwise negative unless mentioned above.    Physical Exam  /71 (BP Location: Left arm)   Pulse 71   Temp 98.4  F (36.9  C) (Oral)   Resp 16   SpO2 94%      Constitutional: Awake, alert, cooperative, in NAD.  Eyes: PERRL  ENT: Normocephalic  Respiratory: Non-labored breathing, good air exchange, clear to auscultation bilaterally, no crackles or wheezing.  Cardiovascular: RRR, no murmur noted.  GI: + bowel sounds, soft, non-distended, non-tender, no masses palpated  Skin: No concerning lesions or rash on exposed areas.  Musculoskeletal: No edema viviana LEs.  Neurologic: Awake, alert & oriented x3.   Psych: appropriate affect      Labs reviewed, pertinent labs as follows:   -WBC 2.0, ANC 0.8, Hb 11.6, plt 150  -, , alk phos 236, Tbili 0.3  -  -ferritin 2880 <- 2785  -reticulocyte 0.4%, 0.016    Impression:    #Acute fever  #Leukopenia/neutropenia  #Thrombocytopenia  #Cervical LAD  #Acute liver injury  #Elevated ferritin    1. We are suspicious that she had some kind of infection, like a viral illness that caused her fever for 5-6 days and acute liver injury, that also lead to a secondary cytopenias. Therefore, we are less suspicious that she has any primary hematologic problem and we anticipate her leukopenia and thrombocytopenia  "to improve as her primary cause improves.     2. Her transaminitis cont to worsen today. We recommend w/up for the acute liver injury.     3. One consideration is HLH given the elevated ferritin but we think an acute viral infection is more likely at this moment. HLH is usually a/w a much higher ferritin level than her current ferritin in 2800s. Ferritin elevation can also be seen in acute inflammation/infection and/or liver injury, so an acute febrile viral infection causing acute liver injury along with the ferritin elevation fits her clinical picture.     Recommendations:  -f/up with hepatitis A, B, C panel   -f/up with CMV PCR result  -please send EBV \"PCR\"   -please trend ferritin daily   -please obtain coags, triglycerides (ordered for you)  -we will cont to only monitor her cytopenias for now, please obtain daily CBC/diff    We will continue to follow.    The above plan was discussed with Dr. Sorto, who agrees with the assessment and plan.     Thank you for allowing me to participate in the care of this patient. Please do not hesitate to contact me if there are any concerns or questions.     Go MD Jeffrey  Hem/onc fellow  Division of Hematology, Oncology, and Transplantation  [facility]    "

## 2023-08-05 NOTE — PROGRESS NOTES
[facility]    Progress Note - Medicine Service, MAROON TEAM 2       Date of Admission:  8/4/2023    Assessment & Plan   Barbara Yates is a 42 year old female admitted on 8/4/2023. She presents with recent febrile illness as well as UTI treated with Bactrim, found to have elevated transaminases and neutropenia.     Today:  - Labs as below to investigate etiology further  - Abd US with doppler normal   - following liver panel   - continuing zosyn     #Neutropenic Fever   #Transaminitis   #Cervical Lymphadenopathy   Etiology of neutropenic fever though to be likely due to viral infection (EBV, CMV, etc), though autoimmune hepatitis could be cause and labs ordered as below. Heme on board, felt not likely due to a primary bone marrow problem. Bactrim discontinued given association with agranulocytosis. Lactate dehydrogenase elevated at 583.   - Continuing Zosyn   - CMV, EBV, Hepatitis panel, respiratory panel, blood smear  - Heme/onc on board, appreciate recs  - GI consulted for transaminitis, appreciate recs   - Abd US with doppler showing patent vasculature    - holding venlafaxine as this can cause liver injury   - checking hep E IgM, NISA and F actin EIA with reflex for autoimmune hepatitis    - avoiding acetaminophen    - daily liver lab trends, repeating liver panel this afternoon       #Depression   #Anxiety   - Continue PTA Buspirone, and Trazodone   - holding venlafaxine as above given transaminitis         Diet: Combination Diet Regular Diet Adult    DVT Prophylaxis: ambulation  Henriquez Catheter: Not present  Fluids: None  Lines: None     Cardiac Monitoring: None  Code Status: Full Code      Clinically Significant Risk Factors Present on Admission          # Hypocalcemia: Lowest Ca = 8.3 mg/dL in last 2 days, will monitor and replace as appropriate              # Overweight: Estimated body mass index is 26.74 kg/m  as calculated from the following:    Height  "as of 8/1/23: 1.595 m (5' 2.8\").    Weight as of 8/1/23: 68 kg (150 lb).            Disposition Plan      Expected Discharge Date: 08/06/2023                The patient's care was discussed with the Attending Physician, Dr. Coy .    Debra Singh MD  Medicine Service, [address]  Securely message with Vocera (more info)  Text page via [facility] Paging/Directory   See signed in provider for up to date coverage information  ______________________________________________________________________    Interval History   Patient felt well overnight, no acute complaints reported. No fever overnight. No abdominal pain reported today. Discussed plan, she voiced agreement to staying a bit longer to trend liver numbers.     Physical Exam   Vital Signs: Temp: 98.4  F (36.9  C) Temp src: Oral BP: 100/71 Pulse: 71   Resp: 16 SpO2: 94 % O2 Device: None (Room air)    Weight: 0 lbs 0 oz    Constitutional: awake, alert, cooperative, no apparent distress  Eyes: Lids and lashes normal  ENT: Normocephalic, without obvious abnormality, atraumatic, oral pharynx with moist mucous membranes, tonsils without erythema or exudates, gums normal and good dentition.  Hematologic / Lymphatic: nontender left cervical lymphadenopathy   Respiratory: No increased work of breathing, good air exchange, clear to auscultation bilaterally, no crackles or wheezing  Cardiovascular: Normal apical impulse, regular rate and rhythm  GI: No scars, normal bowel sounds, soft, non-distended, non-tender, no masses palpated, no hepatosplenomegaly  Skin: No rashes  Neurologic: Awake, alert, oriented to name, place and time.  Neuropsychiatric: General: normal, calm, and normal eye contact    Medical Decision Making       Please see A&P for additional details of medical decision making.      Data   ------------------------- PAST 24 HR DATA REVIEWED -----------------------------------------------  "

## 2023-08-06 VITALS
DIASTOLIC BLOOD PRESSURE: 67 MMHG | WEIGHT: 140.8 LBS | OXYGEN SATURATION: 96 % | TEMPERATURE: 98.4 F | SYSTOLIC BLOOD PRESSURE: 105 MMHG | BODY MASS INDEX: 24.95 KG/M2 | HEART RATE: 76 BPM | RESPIRATION RATE: 18 BRPM | HEIGHT: 63 IN

## 2023-08-06 LAB
ALBUMIN SERPL BCG-MCNC: 3.7 G/DL (ref 3.5–5.2)
ALBUMIN SERPL BCG-MCNC: 3.9 G/DL (ref 3.5–5.2)
ALP SERPL-CCNC: 183 U/L (ref 35–104)
ALP SERPL-CCNC: 192 U/L (ref 35–104)
ALT SERPL W P-5'-P-CCNC: 759 U/L (ref 0–50)
ALT SERPL W P-5'-P-CCNC: 764 U/L (ref 0–50)
ANAPLASMA BLD MOD GIEMSA: NEGATIVE
ANION GAP SERPL CALCULATED.3IONS-SCNC: 13 MMOL/L (ref 7–15)
APTT PPP: 28 SECONDS (ref 22–38)
AST SERPL W P-5'-P-CCNC: 464 U/L (ref 0–45)
AST SERPL W P-5'-P-CCNC: 498 U/L (ref 0–45)
B MICROTI BLD SMEAR: NEGATIVE
BACTERIA UR CULT: NO GROWTH
BASOPHILS # BLD AUTO: 0 10E3/UL (ref 0–0.2)
BASOPHILS NFR BLD AUTO: 0 %
BILIRUB DIRECT SERPL-MCNC: <0.2 MG/DL (ref 0–0.3)
BILIRUB SERPL-MCNC: 0.3 MG/DL
BILIRUB SERPL-MCNC: 0.3 MG/DL
BUN SERPL-MCNC: 8.1 MG/DL (ref 6–20)
CALCIUM SERPL-MCNC: 8.3 MG/DL (ref 8.6–10)
CHLORIDE SERPL-SCNC: 105 MMOL/L (ref 98–107)
CREAT SERPL-MCNC: 0.62 MG/DL (ref 0.51–0.95)
DEPRECATED HCO3 PLAS-SCNC: 19 MMOL/L (ref 22–29)
EHRLICHIA SPEC QL MICRO: NEGATIVE
EOSINOPHIL # BLD AUTO: 0 10E3/UL (ref 0–0.7)
EOSINOPHIL NFR BLD AUTO: 1 %
ERYTHROCYTE [DISTWIDTH] IN BLOOD BY AUTOMATED COUNT: 13.6 % (ref 10–15)
FERRITIN SERPL-MCNC: 2700 NG/ML (ref 6–175)
FIBRINOGEN PPP-MCNC: 256 MG/DL (ref 170–490)
GFR SERPL CREATININE-BSD FRML MDRD: >90 ML/MIN/1.73M2
GLUCOSE SERPL-MCNC: 117 MG/DL (ref 70–99)
HCT VFR BLD AUTO: 35.5 % (ref 35–47)
HGB BLD-MCNC: 11.3 G/DL (ref 11.7–15.7)
IMM GRANULOCYTES # BLD: 0 10E3/UL
IMM GRANULOCYTES NFR BLD: 0 %
INR PPP: 1.07 (ref 0.85–1.15)
LYMPHOCYTES # BLD AUTO: 1.7 10E3/UL (ref 0.8–5.3)
LYMPHOCYTES NFR BLD AUTO: 53 %
MCH RBC QN AUTO: 28.7 PG (ref 26.5–33)
MCHC RBC AUTO-ENTMCNC: 31.8 G/DL (ref 31.5–36.5)
MCV RBC AUTO: 90 FL (ref 78–100)
MONOCYTES # BLD AUTO: 0.5 10E3/UL (ref 0–1.3)
MONOCYTES NFR BLD AUTO: 16 %
NEUTROPHILS # BLD AUTO: 0.9 10E3/UL (ref 1.6–8.3)
NEUTROPHILS NFR BLD AUTO: 30 %
NRBC # BLD AUTO: 0 10E3/UL
NRBC BLD AUTO-RTO: 0 /100
PLAT MORPH BLD: NORMAL
PLATELET # BLD AUTO: 155 10E3/UL (ref 150–450)
POTASSIUM SERPL-SCNC: 4.1 MMOL/L (ref 3.4–5.3)
PROT SERPL-MCNC: 6.4 G/DL (ref 6.4–8.3)
PROT SERPL-MCNC: 6.4 G/DL (ref 6.4–8.3)
RBC # BLD AUTO: 3.94 10E6/UL (ref 3.8–5.2)
RBC MORPH BLD: NORMAL
SODIUM SERPL-SCNC: 137 MMOL/L (ref 136–145)
TRIGL SERPL-MCNC: 121 MG/DL
WBC # BLD AUTO: 3.1 10E3/UL (ref 4–11)

## 2023-08-06 PROCEDURE — 84478 ASSAY OF TRIGLYCERIDES: CPT | Performed by: HOSPITALIST

## 2023-08-06 PROCEDURE — 250N000011 HC RX IP 250 OP 636

## 2023-08-06 PROCEDURE — 82248 BILIRUBIN DIRECT: CPT | Performed by: STUDENT IN AN ORGANIZED HEALTH CARE EDUCATION/TRAINING PROGRAM

## 2023-08-06 PROCEDURE — 999N000248 HC STATISTIC IV INSERT WITH US BY RN

## 2023-08-06 PROCEDURE — 82728 ASSAY OF FERRITIN: CPT

## 2023-08-06 PROCEDURE — 85384 FIBRINOGEN ACTIVITY: CPT | Performed by: HOSPITALIST

## 2023-08-06 PROCEDURE — 85014 HEMATOCRIT: CPT | Performed by: STUDENT IN AN ORGANIZED HEALTH CARE EDUCATION/TRAINING PROGRAM

## 2023-08-06 PROCEDURE — 85730 THROMBOPLASTIN TIME PARTIAL: CPT | Performed by: HOSPITALIST

## 2023-08-06 PROCEDURE — 99231 SBSQ HOSP IP/OBS SF/LOW 25: CPT | Mod: GC | Performed by: STUDENT IN AN ORGANIZED HEALTH CARE EDUCATION/TRAINING PROGRAM

## 2023-08-06 PROCEDURE — 250N000013 HC RX MED GY IP 250 OP 250 PS 637

## 2023-08-06 PROCEDURE — 258N000003 HC RX IP 258 OP 636

## 2023-08-06 PROCEDURE — 86790 VIRUS ANTIBODY NOS: CPT

## 2023-08-06 PROCEDURE — 85041 AUTOMATED RBC COUNT: CPT | Performed by: STUDENT IN AN ORGANIZED HEALTH CARE EDUCATION/TRAINING PROGRAM

## 2023-08-06 PROCEDURE — 36415 COLL VENOUS BLD VENIPUNCTURE: CPT | Performed by: STUDENT IN AN ORGANIZED HEALTH CARE EDUCATION/TRAINING PROGRAM

## 2023-08-06 PROCEDURE — 250N000011 HC RX IP 250 OP 636: Mod: JZ

## 2023-08-06 PROCEDURE — 85610 PROTHROMBIN TIME: CPT | Performed by: HOSPITALIST

## 2023-08-06 PROCEDURE — 36415 COLL VENOUS BLD VENIPUNCTURE: CPT

## 2023-08-06 PROCEDURE — 99239 HOSP IP/OBS DSCHRG MGMT >30: CPT | Performed by: INTERNAL MEDICINE

## 2023-08-06 PROCEDURE — 36415 COLL VENOUS BLD VENIPUNCTURE: CPT | Performed by: HOSPITALIST

## 2023-08-06 RX ORDER — LORAZEPAM 2 MG/ML
0.5 INJECTION INTRAMUSCULAR ONCE
Status: COMPLETED | OUTPATIENT
Start: 2023-08-06 | End: 2023-08-06

## 2023-08-06 RX ORDER — LORAZEPAM 0.5 MG/1
1 TABLET ORAL
Status: DISCONTINUED | OUTPATIENT
Start: 2023-08-06 | End: 2023-08-06 | Stop reason: HOSPADM

## 2023-08-06 RX ADMIN — FLUTICASONE FUROATE AND VILANTEROL TRIFENATATE 1 PUFF: 200; 25 POWDER RESPIRATORY (INHALATION) at 08:12

## 2023-08-06 RX ADMIN — LORAZEPAM 0.5 MG: 2 INJECTION INTRAMUSCULAR; INTRAVENOUS at 01:35

## 2023-08-06 RX ADMIN — PIPERACILLIN AND TAZOBACTAM 3.38 G: 3; .375 INJECTION, POWDER, LYOPHILIZED, FOR SOLUTION INTRAVENOUS at 11:34

## 2023-08-06 RX ADMIN — ACETYLCYSTEINE 6.25 MG/KG/HR: 200 INJECTION, SOLUTION INTRAVENOUS at 01:51

## 2023-08-06 RX ADMIN — BUSPIRONE HYDROCHLORIDE 15 MG: 15 TABLET ORAL at 08:12

## 2023-08-06 RX ADMIN — PIPERACILLIN AND TAZOBACTAM 3.38 G: 3; .375 INJECTION, POWDER, LYOPHILIZED, FOR SOLUTION INTRAVENOUS at 00:31

## 2023-08-06 RX ADMIN — PIPERACILLIN AND TAZOBACTAM 3.38 G: 3; .375 INJECTION, POWDER, LYOPHILIZED, FOR SOLUTION INTRAVENOUS at 06:43

## 2023-08-06 RX ADMIN — ONDANSETRON 4 MG: 2 INJECTION INTRAMUSCULAR; INTRAVENOUS at 00:31

## 2023-08-06 ASSESSMENT — ACTIVITIES OF DAILY LIVING (ADL)
ADLS_ACUITY_SCORE: 19

## 2023-08-06 NOTE — PROVIDER NOTIFICATION
Provider notified: Dniorahg Shady Cho    Acetadote not compatible with Zosyn and infusion times interfere with one another. Pt refusing 2nd PIV due to long hx of fear of needles and fainting. MD paged to advise.    MD and pt agreed pt would be okay with another PIV if Ativan given prior. 0.5mg IV ativan administered, 2nd PIV placed by VAT and pt tolerated well.

## 2023-08-06 NOTE — PROVIDER NOTIFICATION
"Provider notified: Junior Piper    \"Pt complaining of headache but cannot get Tylenol. Could we give ibuprofen?\"  "

## 2023-08-06 NOTE — PLAN OF CARE
DISCHARGE   Discharged to: Home  Via: Automobile  Accompanied by: Family  Discharge Instructions: diet, activity, medications, follow up appointments, when to call the MD, and what to watchout for (i.e. s/s of infection, increasing SOB, palpitations, chest pain,)  Prescriptions: medication list reviewed & sent with pt.  Follow Up Appointments: arranged; information given  Belongings: All sent with pt  IV: out  Telemetry: off  Pt exhibits understanding of above discharge instructions; all questions answered.  Discharge Paperwork: copy signed, given to unit  staff to file in chart.

## 2023-08-06 NOTE — DISCHARGE SUMMARY
"[facility]  Discharge Summary - Medicine & Pediatrics       Date of Admission:  8/4/2023  Date of Discharge:  8/6/2023  Discharging Provider: Sisi Coy MD   Discharge Service: Medicine Service, EL TEAM 2    Discharge Diagnoses   #Neutropenic Fever   #Transaminitis   #Cervical Lymphadenopathy   #Depression   #Anxiety     Clinically Significant Risk Factors     # Overweight: Estimated body mass index is 25.14 kg/m  as calculated from the following:    Height as of this encounter: 1.594 m (5' 2.75\").    Weight as of this encounter: 63.9 kg (140 lb 12.8 oz).       Follow-ups Needed After Discharge   Follow-up Appointments     Adult [facility] Follow-up and recommended labs and tests      Follow up with primary care provider, Nicole Soto, within 7 days for   hospital follow- up and repeat liver panel and CBC.    Appointments on [place] and/or [facility] (with [facility] or [facility]   provider or service). Call [phone] if you haven't heard regarding   these appointments within 7 days of discharge.            Unresulted Labs Ordered in the Past 30 Days of this Admission       Date and Time Order Name Status Description    8/5/2023  3:27 PM EBV DNA PCR Quantitative Whole Blood In process     8/5/2023 12:53 PM Anti Nuclear Ashwini IgG by IFA with Reflex In process     8/5/2023 12:53 PM F Actin EIA with reflex In process     8/5/2023 12:53 PM Hepatitis E Antibody IgM In process     8/5/2023  9:45 AM Bld morphology pathology review In process     8/4/2023  9:55 PM Blood Culture Arm, Right Preliminary     8/4/2023  9:55 PM Blood Culture Hand, Left Preliminary     8/4/2023  8:28 PM EBV Antibody to Early Antigen IgG In process     8/4/2023  8:28 PM EBV Capsid Antibody IgG In process     8/4/2023  8:28 PM EBV Capsid Antibody IgM In process     8/4/2023  8:21 PM Herpes Simplex Virus 1&2 by PCR In process         These results will be followed up by the hospitalist pool and PCP "     Discharge Disposition   Discharged to home  Condition at discharge: Stable    Hospital Course   Barbara Yates was admitted on 8/4/2023. She is a 42 year old female with PMHx of MDD/anxiety, who presented with recent febrile illness as well as UTI treated with Bactrim, found to have elevated transaminases and neutropenia.     Detailed hospital course by problem:     #Neutropenic Fever   #Transaminitis   #Cervical Lymphadenopathy   Etiology of neutropenic fever though to be likely due to viral infection (EBV, CMV, etc), though autoimmune hepatitis could be cause and labs ordered as below. Hematology consulted, felt not likely due to a primary bone marrow problem, though Bactrim discontinued given association with agranulocytosis. Lactate dehydrogenase elevated at 583.   She was started on empiric zosyn, but based on her history, cultures, and lab trajectory this was felt less likely and this was discontinued the following day.   GI was consulted for transaminitis, and based on uptrending labs on HOD1 Ms. Yates was started on NAC infusion 8/5. Labs were notable for Abd US with doppler showing patent vasculature, and hepatitis and viral labs thus far unremarkable. Her venlafaxine was briefly held as well. With stability/improvement in her labs and clinical picture the following day NAC was discontinued and GI felt comfortable with discharge.   - At discharge several studies remain pending, including viral testing (EBV in particular) and autoimmune hepatitis. These results will be followed up by the hospitalist pool, PCP and GI.   - recommend follow-up liver studies within 1 week of discharge to ensure stability.   - bactrim discontinued given abx course completion, and venlafaxine resumed at discharge given unlikely  contributing to clinical picture.        Consultations This Hospital Stay   HEMATOLOGY IP CONSULT  GI HEPATOLOGY ADULT IP CONSULT  GI HEPATOLOGY ADULT IP CONSULT  IV TEAM IP CONSULT  NURSING TO  CONSULT FOR VASCULAR ACCESS CARE IP CONSULT    Code Status   Full Code       MD Jaky Zapata[address]  [address] [facility] [address]  Phone: [phone]  Fax: [phone]  ______________________________________________________________________    Physical Exam   Vital Signs: Temp: 97.8  F (36.6  C) Temp src: Oral BP: 105/68 Pulse: 79   Resp: 18 SpO2: 96 % O2 Device: None (Room air)    Weight: 140 lbs 12.8 oz  Constitutional: awake, alert, cooperative, no apparent distress  Eyes: Lids and lashes normal  ENT: Normocephalic, without obvious abnormality, atraumatic, oral pharynx with moist mucous membranes, tonsils without erythema or exudates, gums normal and good dentition.  Hematologic / Lymphatic: nontender left cervical lymphadenopathy   Respiratory: No increased work of breathing, good air exchange, clear to auscultation bilaterally, no crackles or wheezing  Cardiovascular: Normal apical impulse, regular rate and rhythm  GI: No scars, normal bowel sounds, soft, non-distended, non-tender, no masses palpated, no hepatosplenomegaly  Skin: No rashes  Neurologic: Awake, alert, oriented to name, place and time.  Neuropsychiatric: General: normal, calm, and normal eye contact     Primary Care Physician   Nicole Soto    Discharge Orders      Reason for your hospital stay    You were admitted for decreased white blood cell counts as well as elevated liver labs. Your white cell count has improved throughout the hospitalization and your liver labs are stabilized as well. The cause of these abnormalities remains unclear, but most likely related to a viral infection and will continue to improve on its own. In the meantime, you should limit the amount of tylenol and ibuprofen you take. There are no new medications for you, and we have discontinued the bactrim antibiotic. There are still some labs that haven't resulted yet, your primary care doctor or  one of the hospitalist team will follow up on those with you.     Activity    Your activity upon discharge: activity as tolerated     Adult [facility] Follow-up and recommended labs and tests    Follow up with primary care provider, Nicole Soto, within 7 days for hospital follow- up and repeat liver panel and CBC.    Appointments on [place] and/or [facility] (with [facility] or [facility] provider or service). Call [phone] if you haven't heard regarding these appointments within 7 days of discharge.     Diet    Follow this diet upon discharge: Regular       Significant Results and Procedures   Most Recent 3 CBC's:  Recent Labs   Lab Test 08/06/23  0443 08/05/23  0951 08/05/23  0642   WBC 3.1* 2.0* 2.1*   HGB 11.3* 11.6* 11.8   MCV 90 91 91    150 134*     Most Recent 3 BMP's:  Recent Labs   Lab Test 08/06/23  0443 08/05/23  0642 08/04/23  1248    139 136   POTASSIUM 4.1 4.4 3.7   CHLORIDE 105 106 101   CO2 19* 21* 24   BUN 8.1 6.7 5.4*   CR 0.62 0.83 0.83   ANIONGAP 13 12 11   MARYSE 8.3* 8.3* 8.6   * 78 90     Most Recent 2 LFT's:  Recent Labs   Lab Test 08/06/23  1341 08/06/23  0443   * 498*   * 764*   ALKPHOS 183* 192*   BILITOTAL 0.3 0.3     Most Recent 3 INR's:  Recent Labs   Lab Test 08/06/23  0938 08/04/23 2037   INR 1.07 0.99     7-Day Micro Results       Collected Updated Procedure Result Status      08/05/2023 1404 08/05/2023 1639 C. difficile Toxin B PCR with reflex to C. difficile Antigen and Toxins A/B EIA [[id number]]    Stool from Per Rectum    Final result Component Value   C Difficile Toxin B by PCR Negative   A negative result does not exclude actual disease due to C. difficile and may be due to improper collection, handling and storage of the specimen or the number of organisms in the specimen is below the detection limit of the assay.            08/05/2023 1224 08/05/2023 1513 Respiratory Panel PCR [[id number]]    Swab from Nasopharyngeal    Final result  Component Value   Adenovirus Not Detected   Coronavirus Not Detected   This test detects Coronavirus 229E, HKU1, NL63 and OC43 but does not distinguish between them. It does not detect MERS ( Respiratory Syndrome), SARS (Severe Acute Respiratory Syndrome) or 2019-nCoV (Novel 2019) Coronavirus.   Human Metapneumovirus Not Detected   Human Rhin/Enterovirus Not Detected   Influenza A Not Detected   Influenza A, H1 Not Detected   Influenza A 2009 H1N1 Not Detected   Influenza A, H3 Not Detected   Influenza B Not Detected   Parainfluenza Virus 1 Not Detected   Parainfluenza Virus 2 Not Detected   Parainfluenza Virus 3 Not Detected   Parainfluenza Virus 4 Not Detected   Respiratory Syncytial Virus A Not Detected   Respiratory Syncytial Virus B Not Detected   Chlamydia Pneumoniae Not Detected   Mycoplasma Pneumoniae Not Detected            08/05/2023 0951 08/06/2023 1437 Blood parasitology exam [[id number]]    Peripheral Blood    Final result Component Value   Babesia Negative   Anaplasma Negative   If Anaplasma (Ehrlichia) infection is highly suspected, consider obtaining blood PCR specific assay.  Light microscopy does not exclude the diagnosis.     Ehrlichia Negative            08/05/2023 0642 08/05/2023 1313 Cytomegalovirus DNA by PCR, Quantitative [[id number]]    Blood from Hand, Right    Final result Component Value Units   CMV DNA IU/mL Not Detected IU/mL            08/04/2023 2212 08/06/2023 2316 Blood Culture Hand, Left [[id number]]   Blood from Hand, Left    Preliminary result Component Value   Culture No growth after 2 days  [P]                08/04/2023 2207 08/07/2023 1049 Herpes Simplex Virus 1&2 by PCR [[id number]]    Blood from Arm, Left    Final result Component Value   Herpes Simplex Virus 1 DNA Negative   Herpes Simplex Virus 2 DNA Negative            08/04/2023 2207 08/06/2023 2316 Blood Culture Arm, Right [[id number]]   Blood from Arm, Right    Preliminary result Component Value    Culture No growth after 2 days  [P]                08/04/2023 1305 08/06/2023 0616 Urine Culture [[id number]]   Urine, Clean Catch    Final result Component Value   Culture No Growth               08/04/2023 1257 08/04/2023 1413 Symptomatic Influenza A/B, RSV, & SARS-CoV2 PCR (COVID-19) Nasopharyngeal [[id number]]    Swab from Nasopharyngeal    Final result Component Value   Influenza A PCR Negative   Influenza B PCR Negative   RSV PCR Negative   SARS CoV2 PCR Negative   NEGATIVE: SARS-CoV-2 (COVID-19) RNA not detected, presumed negative.                ,   Results for orders placed or performed during the hospital encounter of 08/04/23   XR Chest 2 Views    Narrative    EXAM: XR CHEST 2 VIEWS 8/4/2023 1:22 PM    DEMOGRAPHICS: Female of 42 years    INDICATION: fevers x 5 days, chills    COMPARISON: 6/25/2018    TECHNIQUE: Upright PA and lateral views of the chest.    FINDINGS:   Lines and tubes Trachea is midline. Normal cardiac silhouette.  Mediastinum is within normal limits. Distinct pulmonary vasculature.  No significant pleural effusion. No discernable pneumothorax. No focal  airspace opacities. Unremarkable upper abdomen. No acute or suspicious  osseous abnormalities. Unremarkable soft tissues.      Impression    IMPRESSION:   Negative chest.    I have personally reviewed the examination and initial interpretation  and I agree with the findings.    MARINA SANCHEZ MD         SYSTEM ID:  [id number]   Abdomen US, limited (RUQ only)    Narrative    EXAMINATION: Limited Abdominal Ultrasound, 8/4/2023 3:21 PM     COMPARISON: None.    HISTORY: Transaminitis, chills, neutropenia    FINDINGS:   Fluid: No evidence of ascites or pleural effusions.    Liver: The liver demonstrates normal echotexture, measuring 12.8 cm in  craniocaudal dimension. There is no focal mass.     Gallbladder: There is no wall thickening, pericholecystic fluid, or  evidence for cholelithiasis. Tiny echogenic foci extending from the  wall  of the gallbladder may represent small polyps.    Bile Ducts: Both the intra- and extrahepatic biliary system are of  normal caliber.  The common bile duct measures 3.5 mm in diameter.    Pancreas: Visualized portions of the head and body of the pancreas are  unremarkable.     Kidney: The right kidney measures 11.4 cm long. There is no  hydronephrosis or hydroureter, no shadowing renal calculi, cystic  lesion or mass.       Impression    IMPRESSION:   1.  Possible tiny gallbladder polyps. Otherwise unremarkable right  upper quadrant ultrasound.    I have personally reviewed the examination and initial interpretation  and I agree with the findings.    SRI CHO DO         SYSTEM ID:  [id number]   US Abd Hepatic & Portal Vasculature Cmpl    Narrative    EXAMINATION: US ABD HEPATIC & PORTAL VASCULATURE CMPL, 8/5/2023  10:01 AM     COMPARISON: Abdominal ultrasound 8/4/2023    HISTORY: Evaluate liver blood flow with Doppler, spleen.    TECHNIQUE:  Grey-scale, color Doppler and spectral flow analysis.    FINDINGS:    LIVER DOPPLER:  Splenic vein:  Patent continuous normal antegrade direction flow  towards the liver, 12 cm/sec.  Main portal vein:  Patent continuous antegrade flow, 23 cm/sec..  Right portal vein flow is antegrade, measuring 20 cm/sec.  Left portal vein flow is antegrade, measuring 70 cm/sec.    Inferior vena cava patent with flow toward the heart throughout,  measuring 81 cm/s.    Right, mid, left hepatic veins: Patent with flow towards the inferior  vena cava.    Hepatic artery: Low resistance waveform with antegrade flow, 61 cm/s.  Resistive index: 0.68.    Spleen measures 10.5 cm, normal.      Impression    Impression:   1.  Normal liver Doppler ultrasound.    I have personally reviewed the examination and initial interpretation  and I agree with the findings.    TR FOSTER MD         SYSTEM ID:  [id number]       Discharge Medications   Current Discharge Medication List        CONTINUE these  medications which have NOT CHANGED    Details   albuterol (PROAIR HFA/PROVENTIL HFA/VENTOLIN HFA) 108 (90 Base) MCG/ACT inhaler Inhale 2 puffs into the lungs every 6 hours as needed for shortness of breath, wheezing or cough      busPIRone (BUSPAR) 15 MG tablet TAKE 1 TABLET TWICE A DAY  Qty: 180 tablet, Refills: 3    Associated Diagnoses: Generalized anxiety disorder      clindamycin (CLINDAMAX) 1 % external gel Apply topically 2 times daily as needed      levonorgestrel (KYLEENA) 19.5 MG IUD 1 each (19.5 mg) by Intrauterine route once for 1 dose  Qty: 1 each, Refills: 0      mometasone-formoterol (DULERA) 200-5 MCG/ACT inhaler USE 2 INHALATIONS TWICE A DAY (NEED TO BE SEEN FOR FURTHER REFILLS)  Qty: 13 g, Refills: 11    Associated Diagnoses: Mild persistent asthma without complication      montelukast (SINGULAIR) 10 MG tablet Take 1 tablet (10 mg) by mouth At Bedtime  Qty: 90 tablet, Refills: 2    Associated Diagnoses: Seasonal allergic rhinitis due to pollen      ondansetron (ZOFRAN ODT) 8 MG ODT tab Take 1 tablet (8 mg) by mouth every 8 hours as needed for nausea  Qty: 30 tablet, Refills: 0    Associated Diagnoses: Nausea      traZODone (DESYREL) 50 MG tablet TAKE 1 TABLET AT BEDTIME AS NEEDED  Strength: 50 mg  Qty: 90 tablet, Refills: 3    Associated Diagnoses: Disturbance in sleep behavior      tretinoin (RETIN-A) 0.1 % external cream Apply topically At Bedtime      venlafaxine (EFFEXOR XR) 75 MG 24 hr capsule Take 3 capsules (225 mg) by mouth daily  Qty: 270 capsule, Refills: 1    Associated Diagnoses: Generalized anxiety disorder           STOP taking these medications       sulfamethoxazole-trimethoprim (BACTRIM DS) 800-160 MG tablet Comments:   Reason for Stopping:             Allergies   Allergies   Allergen Reactions    Seasonal Allergies       Physician Attestation   I saw and evaluated this patient prior to discharge.  I discussed the patient with the resident/fellow and agree with plan of care as  documented in the note.      I personally reviewed vital signs, medications, labs, and imaging.    I personally spent 35 minutes on discharge activities.    Sisi Coy MD  Date of Service (when I saw the patient): 8/6/23

## 2023-08-06 NOTE — PROGRESS NOTES
HEPATOLOGY PROGRESS NOTE      Date of Admission:  8/4/2023          ASSESSMENT AND RECOMMENDATIONS:     Barbara Yates is a 42 year old female with history of infectious mononucleosis, depression and anxiety who presents for evaluation of recent febrile illness found to have elevated transaminases .     # Elevated LFTs, hepatocellular pattern  Suspect viral hepatitis etiology most likely given constellation of accompanying symptoms over the past week (Although ferritin is higher than expected for a viral illness). Regarding possible toxin injury, no significant EtOH use but has been taking ~3000mg of acetaminophen daily for 7 days. This amount would not typically cause acute injury on its own however if transaminases continue to rise it may be reasonable to start NAC infusion. Patent hepatic vasculature. Differential also includes autoimmune hepatitis (june in young female) although clinical picture not suggestive of this would recommend checking antibodies.    Patient clinically stable with improving symptoms and no longer febrile.     LFTs continued to rise on 8/5 so NAC infusion was started given history of daily acetaminophen use. Patient continues to feel well and is afebrile.     RECOMMENDATIONS  - Repeat LFTs this afternoon   - If repeat LFTs are improving or stable, okay to stop NAC infusion this afternoon.   - Okay to discharge as long as LFTs are not worse on recheck today. If she does discharge, repeat labs should be done with PCP within 1 week.   - Pending labs   - EBV, HSV, NISA, F actin, hepatitis E IgM     Thank you for involving us in this patient's care. Please do not hesitate to contact the GI service with any questions or concerns.     Patient care plan discussed with Dr. Stacy, GI staff physician.    Guillermo Gordon MD  Gastroenterology Fellow   [facility]                Interval History:   Patient had episode of nausea and vomiting after starting NAC yesterday. Now feeling  "well. No nausea, vomiting, abdominal pain. Feeling well enough to go home if her labs are improved this afternoon.          Physical Exam:   /68 (BP Location: Right arm)   Pulse 79   Temp 97.8  F (36.6  C) (Oral)   Resp 18   Ht 1.594 m (5' 2.75\")   Wt 63.9 kg (140 lb 12.8 oz)   SpO2 96%   BMI 25.14 kg/m    Wt:   Wt Readings from Last 2 Encounters:   08/06/23 63.9 kg (140 lb 12.8 oz)   08/01/23 68 kg (150 lb)      Constitutional: cooperative, pleasant, not dyspneic/diaphoretic, no acute distress  Eyes: Sclera anicteric/injected  Ears/nose/mouth/throat: Normal oropharynx without ulcers or exudate, mucus membranes moist, hearing intact  Neck: supple  CV: No edema  Respiratory: Unlabored breathing  Abdomen:  Nondistended, +bs, no hepatosplenomegaly, nontender, no peritoneal signs  Skin: warm, perfused, no jaundice  Neuro: AAO x 3, No asterixis. Moving all extremities equally.   Psych: Normal affect  MSK: In bed         Data:   Labs and imaging below were independently reviewed and interpreted    Computed MELD 3.0 unavailable. Necessary lab results were not found in the last year.  MELD-Na: 7 at 8/6/2023  9:38 AM  Calculated from:  Serum Creatinine: 0.62 mg/dL (Using min of 1 mg/dL) at 8/6/2023  4:43 AM  Serum Sodium: 137 mmol/L at 8/6/2023  4:43 AM  Total Bilirubin: 0.3 mg/dL (Using min of 1 mg/dL) at 8/6/2023  4:43 AM  INR(ratio): 1.07 at 8/6/2023  9:38 AM       BMP  Recent Labs   Lab 08/06/23  0443 08/05/23  0642 08/04/23  1248    139 136   POTASSIUM 4.1 4.4 3.7   CHLORIDE 105 106 101   MARYSE 8.3* 8.3* 8.6   CO2 19* 21* 24   BUN 8.1 6.7 5.4*   CR 0.62 0.83 0.83   * 78 90     CBC  Recent Labs   Lab 08/06/23 0443 08/05/23  0951 08/05/23  0642 08/04/23  1248   WBC 3.1* 2.0* 2.1* 1.7*   RBC 3.94 3.97 3.95 4.20   HGB 11.3* 11.6* 11.8 12.3   HCT 35.5 36.3 35.9 37.4   MCV 90 91 91 89   MCH 28.7 29.2 29.9 29.3   MCHC 31.8 32.0 32.9 32.9   RDW 13.6 13.8 13.5 13.7    150 134* 130*     INR  Recent " Labs   Lab 08/06/23  0938 08/04/23  2037   INR 1.07 0.99     LFTs  Recent Labs   Lab 08/06/23  0443 08/05/23  1559 08/05/23  0642 08/04/23  1248   ALKPHOS 192* 238* 236* 283*   * 580* 540* 450*   * 768* 654* 504*   BILITOTAL 0.3 0.3 0.3 0.3   PROTTOTAL 6.4 6.9 6.6 7.3   ALBUMIN 3.9 4.0 3.9 4.3      PANCNo lab results found in last 7 days.

## 2023-08-06 NOTE — PLAN OF CARE
"Status 4743-1865    BP 99/63 (BP Location: Right arm)   Pulse 70   Temp 97.8  F (36.6  C) (Oral)   Resp 16   Ht 1.594 m (5' 2.75\")   Wt 64.4 kg (141 lb 14.4 oz)   SpO2 96%   BMI 25.34 kg/m      A&O x4. Independent in room. VSS, afebrile. Acetadote gtt infusing at 33.5mL/hr. Zofran PRN for nausea. Emesis x1 overnight. Continues on IV zosyn q 6hrs. Pt hopeful to discharge soon.    Continue with plan of care, report changes to Mart[facility] 2.    "

## 2023-08-06 NOTE — PROGRESS NOTES
[facility]    Progress Note - Medicine Service, [facility] TEAM 2       Date of Admission:  8/4/2023    Assessment & Plan   Barbara Yates is a 42 year old female admitted on 8/4/2023. She presents with recent febrile illness as well as UTI treated with Bactrim, found to have elevated transaminases and neutropenia. Stable, workup of etiology ongoing.     Today:  - Labs as below to investigate etiology further  - following liver panel - stable since starting NAC  - repeat liver panel this afternoon, if the same or better can stop NAC  - discontinuing zosyn     #Neutropenic Fever   #Transaminitis   #Cervical Lymphadenopathy   Etiology of neutropenic fever though to be likely due to viral infection (EBV, CMV, etc), though autoimmune hepatitis could be cause and labs ordered as below. Heme on board, felt not likely due to a primary bone marrow problem. Bactrim discontinued given association with agranulocytosis. Lactate dehydrogenase elevated at 583.   - Discontinuing zosyn, less likely to be bacterial given stable vitals and negative cultures all around   - CMV, EBV, Hepatitis panel, respiratory panel, blood smear  - Heme/onc on board, appreciate recs  - GI consulted for transaminitis, appreciate recs   - started NAC infusion 8/5 given elevated transaminases that day   - Abd US with doppler showing patent vasculature    - holding venlafaxine as this can cause liver injury   - checking hep E IgM, NISA and F actin EIA with reflex for autoimmune hepatitis    - avoiding acetaminophen    - daily liver lab trends- stable since starting NAC   - INR stable        #Depression   #Anxiety   - Continue PTA Buspirone, and Trazodone   - holding venlafaxine as above given transaminitis         Diet: Combination Diet Regular Diet Adult    DVT Prophylaxis: ambulation  Henriquez Catheter: Not present  Fluids: None  Lines: None     Cardiac Monitoring: None  Code Status: Full Code   "    Clinically Significant Risk Factors          # Hypocalcemia: Lowest Ca = 8.3 mg/dL in last 2 days, will monitor and replace as appropriate                # Overweight: Estimated body mass index is 25.14 kg/m  as calculated from the following:    Height as of this encounter: 1.594 m (5' 2.75\").    Weight as of this encounter: 63.9 kg (140 lb 12.8 oz)., PRESENT ON ADMISSION          Disposition Plan     Expected Discharge Date: 08/06/2023                The patient's care was discussed with the Attending Physician, Dr. Coy .    Debra Singh MD  Medicine Service, [facility] TEAM [address] [place] [address]  Securely message with [employer] (more info)  Text page via [facility] Paging/Directory   See signed in provider for up to date coverage information  ______________________________________________________________________    Interval History   Overnight needed additional IV placed for NAC infusion, patient has fear of needles therefore needed ativan. Also reported feeling \"heavy\" around this time, had one episode of emesis, has felt well since. No abdominal pain, SOB reported this morning. Updated on plan this AM, voiced understanding.     Physical Exam   Vital Signs: Temp: 97.8  F (36.6  C) Temp src: Oral BP: 105/68 Pulse: 79   Resp: 18 SpO2: 96 % O2 Device: None (Room air)    Weight: 140 lbs 12.8 oz    Constitutional: awake, alert, cooperative, no apparent distress  Eyes: Lids and lashes normal  ENT: Normocephalic, without obvious abnormality, atraumatic, oral pharynx with moist mucous membranes, tonsils without erythema or exudates, gums normal and good dentition.  Hematologic / Lymphatic: nontender left cervical lymphadenopathy   Respiratory: No increased work of breathing, good air exchange, clear to auscultation bilaterally, no crackles or wheezing  Cardiovascular: Normal apical impulse, regular rate and rhythm  GI: No scars, normal bowel sounds, soft, non-distended, non-tender, no " masses palpated, no hepatosplenomegaly  Skin: No rashes  Neurologic: Awake, alert, oriented to name, place and time.  Neuropsychiatric: General: normal, calm, and normal eye contact    Medical Decision Making       Please see A&P for additional details of medical decision making.      Data   ------------------------- PAST 24 HR DATA REVIEWED -----------------------------------------------

## 2023-08-07 ENCOUNTER — TELEPHONE (OUTPATIENT)
Dept: FAMILY MEDICINE | Facility: CLINIC | Age: 43
End: 2023-08-07
Payer: COMMERCIAL

## 2023-08-07 LAB
ANA PAT SER IF-IMP: ABNORMAL
ANA SER QL IF: POSITIVE
ANA TITR SER IF: ABNORMAL {TITER}
EBV DNA # SPEC NAA+PROBE: NOT DETECTED COPIES/ML
EBV EA-D IGG SER-ACNC: 21.5 U/ML (ref 0–9)
EBV EA-D IGG SER-ACNC: POSITIVE
EBV VCA IGG SER IA-ACNC: 144 U/ML
EBV VCA IGG SER IA-ACNC: POSITIVE
EBV VCA IGM SER IA-ACNC: <10 U/ML
EBV VCA IGM SER IA-ACNC: NORMAL
HSV1 DNA SPEC QL NAA+PROBE: NEGATIVE
HSV2 DNA SPEC QL NAA+PROBE: NEGATIVE
PATH REPORT.COMMENTS IMP SPEC: NORMAL
PATH REPORT.COMMENTS IMP SPEC: NORMAL
PATH REPORT.FINAL DX SPEC: NORMAL
PATH REPORT.MICROSCOPIC SPEC OTHER STN: NORMAL
PATH REPORT.MICROSCOPIC SPEC OTHER STN: NORMAL
PATH REPORT.RELEVANT HX SPEC: NORMAL
SMA IGG SER-ACNC: 7 UNITS

## 2023-08-07 NOTE — TELEPHONE ENCOUNTER
Spoke with Barbara and let her know that the referral she is asking about will have to wait till her visit tomorrow     Patient agreed with plan     Closing encounter.    Cristine Espinoza RN  [facility] ~ Registered Nurse  Clinic Triage ~ [place] [facility]  August 7, 2023

## 2023-08-07 NOTE — TELEPHONE ENCOUNTER
"Was in hospital and just got discharged yesterday.  She does have a ED/Hosp follow up scheduled for tomorrow with you but is asking if you could send a referral for a rheumatologist.    Did ask her if she could wait until tomorrow at the visit.  She stated, \"I don't know when I am going to be able to get in with Rheumatology that is why I am asking for it now.\"      I did advise that this might have to wait until being seen tomorrow but would route it to you for recommendation.    Cristine Espinoza RN  [facility] ~ Registered Nurse  Clinic Triage ~ [place] [facility]  August 7, 2023    "

## 2023-08-08 ENCOUNTER — OFFICE VISIT (OUTPATIENT)
Dept: FAMILY MEDICINE | Facility: CLINIC | Age: 43
End: 2023-08-08
Payer: COMMERCIAL

## 2023-08-08 ENCOUNTER — PATIENT OUTREACH (OUTPATIENT)
Dept: CARE COORDINATION | Facility: CLINIC | Age: 43
End: 2023-08-08

## 2023-08-08 VITALS
HEART RATE: 82 BPM | SYSTOLIC BLOOD PRESSURE: 116 MMHG | BODY MASS INDEX: 25.16 KG/M2 | TEMPERATURE: 97.9 F | WEIGHT: 142 LBS | RESPIRATION RATE: 20 BRPM | DIASTOLIC BLOOD PRESSURE: 76 MMHG | HEIGHT: 63 IN | OXYGEN SATURATION: 99 %

## 2023-08-08 DIAGNOSIS — R50.81 FEBRILE NEUTROPENIA (H): ICD-10-CM

## 2023-08-08 DIAGNOSIS — R76.8 POSITIVE ANA (ANTINUCLEAR ANTIBODY): ICD-10-CM

## 2023-08-08 DIAGNOSIS — D70.9 FEBRILE NEUTROPENIA (H): ICD-10-CM

## 2023-08-08 DIAGNOSIS — R74.01 TRANSAMINITIS: Primary | ICD-10-CM

## 2023-08-08 LAB
ALBUMIN SERPL BCG-MCNC: 4.1 G/DL (ref 3.5–5.2)
ALP SERPL-CCNC: 164 U/L (ref 35–104)
ALT SERPL W P-5'-P-CCNC: 644 U/L (ref 0–50)
AST SERPL W P-5'-P-CCNC: 212 U/L (ref 0–45)
BILIRUB DIRECT SERPL-MCNC: <0.2 MG/DL (ref 0–0.3)
BILIRUB SERPL-MCNC: 0.4 MG/DL
ERYTHROCYTE [DISTWIDTH] IN BLOOD BY AUTOMATED COUNT: 13 % (ref 10–15)
HCT VFR BLD AUTO: 35 % (ref 35–47)
HEV IGM SER QL: NEGATIVE
HGB BLD-MCNC: 12 G/DL (ref 11.7–15.7)
MCH RBC QN AUTO: 29.8 PG (ref 26.5–33)
MCHC RBC AUTO-ENTMCNC: 34.3 G/DL (ref 31.5–36.5)
MCV RBC AUTO: 87 FL (ref 78–100)
MONOCYTES NFR BLD AUTO: NEGATIVE %
PLAT MORPH BLD: NORMAL
PLATELET # BLD AUTO: 223 10E3/UL (ref 150–450)
PROT SERPL-MCNC: 6.8 G/DL (ref 6.4–8.3)
RBC # BLD AUTO: 4.03 10E6/UL (ref 3.8–5.2)
RBC MORPH BLD: NORMAL
WBC # BLD AUTO: 5.1 10E3/UL (ref 4–11)

## 2023-08-08 PROCEDURE — 86308 HETEROPHILE ANTIBODY SCREEN: CPT | Performed by: PHYSICIAN ASSISTANT

## 2023-08-08 PROCEDURE — 36416 COLLJ CAPILLARY BLOOD SPEC: CPT | Performed by: PHYSICIAN ASSISTANT

## 2023-08-08 PROCEDURE — 85027 COMPLETE CBC AUTOMATED: CPT | Performed by: PHYSICIAN ASSISTANT

## 2023-08-08 PROCEDURE — 80076 HEPATIC FUNCTION PANEL: CPT | Performed by: PHYSICIAN ASSISTANT

## 2023-08-08 PROCEDURE — 99495 TRANSJ CARE MGMT MOD F2F 14D: CPT | Performed by: PHYSICIAN ASSISTANT

## 2023-08-08 ASSESSMENT — PATIENT HEALTH QUESTIONNAIRE - PHQ9
10. IF YOU CHECKED OFF ANY PROBLEMS, HOW DIFFICULT HAVE THESE PROBLEMS MADE IT FOR YOU TO DO YOUR WORK, TAKE CARE OF THINGS AT HOME, OR GET ALONG WITH OTHER PEOPLE: NOT DIFFICULT AT ALL
SUM OF ALL RESPONSES TO PHQ QUESTIONS 1-9: 0
SUM OF ALL RESPONSES TO PHQ QUESTIONS 1-9: 0

## 2023-08-08 ASSESSMENT — PAIN SCALES - GENERAL: PAINLEVEL: SEVERE PAIN (7)

## 2023-08-08 ASSESSMENT — ASTHMA QUESTIONNAIRES: ACT_TOTALSCORE: 22

## 2023-08-08 NOTE — PROGRESS NOTES
"  Assessment & Plan     Transaminitis  Thankfully her hepatic panel continues to improve, we will have her continue to avoid Tylenol and alcohol.  Have her drink plenty of fluids and rest plan to recheck again in 1 week  - Hepatic panel (Albumin, ALT, AST, Bili, Alk Phos, TP); Future  - Mononucleosis screen; Future  - Hepatic panel (Albumin, ALT, AST, Bili, Alk Phos, TP)  - Mononucleosis screen  I spoke to Dr. Lopes regarding her EBV levels since she has had a past positive it makes sense that her IgG is positive though her IgM is negative, she recommended doing a mono spot to see if it is positive though it is neg making a current reinfection less likely    Febrile neutropenia (H)  CBC has now improved.  Thankfully her fevers have also resolved  - CBC with platelets; Future  - Mononucleosis screen; Future  - CBC with platelets  - Mononucleosis screen  - RBC and Platelet Morphology    Positive NISA (antinuclear antibody)  We will refer to rheumatology, certain viral illnesses can cause a positive NISA, nonetheless I would like her seen by rheumatology to ensure we are not missing any rheumatologic conditions.  She still has some tests pending to rule out autoimmune hepatitis  - Adult Rheumatology  Referral; Future    She will follow-up at once if she becomes febrile once again or if any of her symptoms are acutely worsening.       MED REC REQUIRED  Post Medication Reconciliation Status: discharge medications reconciled, continue medications without change  BMI:   Estimated body mass index is 25.36 kg/m  as calculated from the following:    Height as of this encounter: 1.594 m (5' 2.75\").    Weight as of this encounter: 64.4 kg (142 lb).   Weight management plan: Patient was referred to their PCP to discuss a diet and exercise plan.        Jaja Garza PA-C  [facility] LAURIE Jimenez is a 42 year old, presenting for the following health issues:  Hospital F/U      8/8/2023 "     9:22 AM   Additional Questions   Roomed by Geri SRINIVASAN CMA   Accompanied by self         8/8/2023     9:22 AM   Patient Reported Additional Medications   Patient reports taking the following new medications n/a, would       HPI       Hospital Follow-up Visit:    Hospital/Nursing Home/IP Rehab Facility: [facility]  Date of Admission: 08/04/23    Date of Discharge: 08/06/23  Reason(s) for Admission: #Neutropenic Fever   #Transaminitis   #Cervical Lymphadenopathy   #Depression   #Anxiety     Was your hospitalization related to COVID-19? No   Problems taking medications regularly:  None  Medication changes since discharge: None  Problems adhering to non-medication therapy:  None    Summary of hospitalization:  [facility] discharge summary reviewed  Diagnostic Tests/Treatments reviewed.  Follow up needed: Hepatic panel and CBC  Other Healthcare Providers Involved in Patient s Care:         None  Update since discharge: stable.     Starting at the end of July on the 29th she developed some viral symptoms initially swollen glands and body aches and pains.  She tested negative to strep influenza and COVID.  On August 1 she was seen again diagnosed with a viral syndrome and she also had dizziness and nausea.  Finally she went to the ER on 8 4 and was admitted to the hospital.  She had multiple tests run without any obvious source of her fever or elevated liver enzyme tests.  There are still some test pending.  She does report a history of mono about 17 to 18 years ago.    Since her discharge she has had no further fevers or episodes of vomiting.  Her appetite is still low but she is making herself eat and drink.  She is drinking liquid IV as recommended by the staff at [facility].  She feels that she is hydrating adequately.  Zofran does help with nausea so that she is able to drink more fluids.  She continues to have aches particularly in her neck headaches and some  nausea no diarrhea, vomiting, sore throat or abdominal pain.  She is having some difficulty sleeping comfortably due to the pain in her neck.  She was told not to take any Tylenol or Advil.  She did take 400 mg of Advil yesterday because she had to due to pain in her neck.  It did help.  She is using ice and heat as well she wonders about a muscle relaxer.  She has mainly been sleeping and she is very tired.  She is working 4 hours a day and has a very flexible work schedule.  She will work for an hour and then rest and then get up and work for an hour excetra.  She feels that she is able to work and does not need any modifications to her current work schedule.  Plan of care communicated with patient           Pain History:  When did you first notice your pain? Neck pain, started 1 day ago  she thinks it may be related to laying down so much  Have you seen anyone else for your pain? No  How has your pain affected your ability to work? Not applicable  Where in your body do you have pain? Back Pain  Onset/Duration: 1 day  Description:   Location of pain: upper back bilateral  Character of pain: dull ache and constant  Pain radiation: down arm  New numbness or weakness in legs, not attributed to pain: no   Intensity: Currently 7/10  Progression of Symptoms: same  History:   Specific cause: none  Pain interferes with job: N/A  History of back problems: no prior back problems  Any previous MRI or X-rays: none  Sees a specialist for back pain: No      Therapies tried and outcome: nothing told to avoid tylenol and ibuprofen     Answers submitted by the patient for this visit:  Patient Health Questionnaire (Submitted on 8/8/2023)  If you checked off any problems, how difficult have these problems made it for you to do your work, take care of things at home, or get along with other people?: Not difficult at all  PHQ9 TOTAL SCORE: 0            Review of Systems   Constitutional, HEENT, cardiovascular, pulmonary, gi and gu  "systems are negative, except as otherwise noted.      Objective    /76   Pulse 82   Temp 97.9  F (36.6  C)   Resp 20   Ht 1.594 m (5' 2.75\")   Wt 64.4 kg (142 lb)   SpO2 99%   BMI 25.36 kg/m    Body mass index is 25.36 kg/m .  Physical Exam   GENERAL: alert, no distress, and fatigued  EYES: Eyes grossly normal to inspection, PERRL and conjunctivae and sclerae normal  HENT: ear canals and TM's normal, nose and mouth without ulcers or lesions  NECK: cervical adenopathy anterior, mild, no asymmetry, masses, or scars, and thyroid normal to palpation  RESP: lungs clear to auscultation - no rales, rhonchi or wheezes  CV: regular rate and rhythm, normal S1 S2, no S3 or S4, no murmur, click or rub, no peripheral edema and peripheral pulses strong  ABDOMEN: soft, nontender, no hepatosplenomegaly, no masses and bowel sounds normal  MS: no gross musculoskeletal defects noted, no edema  SKIN: no suspicious lesions or rashes  PSYCH: mentation appears normal, affect normal/bright    No results displayed because visit has over 200 results.        Results for orders placed or performed in visit on 08/08/23   CBC with platelets     Status: Normal   Result Value Ref Range    WBC Count 5.1 4.0 - 11.0 10e3/uL    RBC Count 4.03 3.80 - 5.20 10e6/uL    Hemoglobin 12.0 11.7 - 15.7 g/dL    Hematocrit 35.0 35.0 - 47.0 %    MCV 87 78 - 100 fL    MCH 29.8 26.5 - 33.0 pg    MCHC 34.3 31.5 - 36.5 g/dL    RDW 13.0 10.0 - 15.0 %    Platelet Count 223 150 - 450 10e3/uL   Hepatic panel (Albumin, ALT, AST, Bili, Alk Phos, TP)     Status: Abnormal   Result Value Ref Range    Protein Total 6.8 6.4 - 8.3 g/dL    Albumin 4.1 3.5 - 5.2 g/dL    Bilirubin Total 0.4 <=1.2 mg/dL    Alkaline Phosphatase 164 (H) 35 - 104 U/L     (H) 0 - 45 U/L     (HH) 0 - 50 U/L    Bilirubin Direct <0.20 0.00 - 0.30 mg/dL   Mononucleosis screen     Status: Normal   Result Value Ref Range    Mononucleosis Screen Negative Negative   RBC and Platelet " Morphology     Status: None   Result Value Ref Range    Platelet Assessment  Automated Count Confirmed. Platelet morphology is normal.     Automated Count Confirmed. Platelet morphology is normal.    RBC Morphology Confirmed RBC Indices

## 2023-08-08 NOTE — PROGRESS NOTES
[facility]    Background: Transitional Care Management program identified per system criteria and reviewed by [facility] team for possible outreach.    Assessment: Upon chart review, [facility] Team member will not proceed with patient outreach related to this episode of Transitional Care Management program due to reason below:    Patient has a follow up appointment with an appropriate provider today for hospital discharge    Plan: Transitional Care Management episode addressed appropriately per reason noted above.      Vivien Ochoa  Community Health Worker  [facility]  [phone]      *[facility] Care Resource Team does NOT follow patient ongoing. Referrals are identified based on internal discharge reports and the outreach is to ensure patient has an understanding of their discharge instructions.

## 2023-08-09 LAB
BACTERIA BLD CULT: NO GROWTH
BACTERIA BLD CULT: NO GROWTH

## 2023-08-14 ENCOUNTER — MYC MEDICAL ADVICE (OUTPATIENT)
Dept: FAMILY MEDICINE | Facility: CLINIC | Age: 43
End: 2023-08-14
Payer: COMMERCIAL

## 2023-08-14 DIAGNOSIS — R74.01 TRANSAMINITIS: Primary | ICD-10-CM

## 2023-08-14 NOTE — TELEPHONE ENCOUNTER
Orders placed please help patient schedule if she continues to feel ill I would recommend a face-to-face visit as well  Jaja Garza PA-C

## 2023-08-14 NOTE — TELEPHONE ENCOUNTER
"Did you want to check her Hepatic panel again this week as per OV on 8/8/23 \"Have her drink plenty of fluids and rest plan to recheck again in 1 week \"  Does not have order placed yet.    Cristine Espinoza RN  [facility] ~ Registered Nurse  Clinic Triage ~ [place] [facility]  August 14, 2023    "

## 2023-08-15 ENCOUNTER — LAB (OUTPATIENT)
Dept: LAB | Facility: CLINIC | Age: 43
End: 2023-08-15
Payer: COMMERCIAL

## 2023-08-15 DIAGNOSIS — R74.01 TRANSAMINITIS: ICD-10-CM

## 2023-08-15 LAB
ALBUMIN SERPL BCG-MCNC: 4.6 G/DL (ref 3.5–5.2)
ALP SERPL-CCNC: 110 U/L (ref 35–104)
ALT SERPL W P-5'-P-CCNC: 104 U/L (ref 0–50)
AST SERPL W P-5'-P-CCNC: 30 U/L (ref 0–45)
BILIRUB DIRECT SERPL-MCNC: <0.2 MG/DL (ref 0–0.3)
BILIRUB SERPL-MCNC: 0.6 MG/DL
PROT SERPL-MCNC: 7.2 G/DL (ref 6.4–8.3)

## 2023-08-15 PROCEDURE — 36415 COLL VENOUS BLD VENIPUNCTURE: CPT

## 2023-08-15 PROCEDURE — 80076 HEPATIC FUNCTION PANEL: CPT

## 2023-08-17 ENCOUNTER — TELEPHONE (OUTPATIENT)
Dept: FAMILY MEDICINE | Facility: CLINIC | Age: 43
End: 2023-08-17
Payer: COMMERCIAL

## 2023-08-17 NOTE — TELEPHONE ENCOUNTER
Forms/Letter Request    Type of form/letter:  Office notes request     Have you been seen for this request: Yes    Do we have the form/letter: Yes    Who is the form from? Insurance comp    Where did/will the form come from? form was faxed in    When is form/letter needed by: as time permits     How would you like the form/letter returned: Fax : [phone]    Patient Notified form requests are processed in 3-5 business days:No

## 2023-09-07 ENCOUNTER — VIRTUAL VISIT (OUTPATIENT)
Dept: FAMILY MEDICINE | Facility: CLINIC | Age: 43
End: 2023-09-07
Payer: COMMERCIAL

## 2023-09-07 DIAGNOSIS — R74.01 TRANSAMINITIS: Primary | ICD-10-CM

## 2023-09-07 DIAGNOSIS — R76.8 POSITIVE ANA (ANTINUCLEAR ANTIBODY): ICD-10-CM

## 2023-09-07 DIAGNOSIS — F41.1 GENERALIZED ANXIETY DISORDER: ICD-10-CM

## 2023-09-07 PROCEDURE — 99214 OFFICE O/P EST MOD 30 MIN: CPT | Mod: VID | Performed by: NURSE PRACTITIONER

## 2023-09-07 RX ORDER — COPPER 313.4 MG/1
1 INTRAUTERINE DEVICE INTRAUTERINE ONCE
COMMUNITY
Start: 2023-08-22 | End: 2033-08-19

## 2023-09-07 RX ORDER — VENLAFAXINE HYDROCHLORIDE 75 MG/1
225 CAPSULE, EXTENDED RELEASE ORAL DAILY
Qty: 270 CAPSULE | Refills: 1 | Status: SHIPPED | OUTPATIENT
Start: 2023-09-07

## 2023-09-07 RX ORDER — CETIRIZINE HYDROCHLORIDE 10 MG/1
10 TABLET ORAL DAILY
COMMUNITY
Start: 2023-09-07

## 2023-09-07 NOTE — PROGRESS NOTES
Barbara is a 42 year old who is being evaluated via a billable video visit.      How would you like to obtain your AVS? MyChart  If the video visit is dropped, the invitation should be resent by: Text to cell phone: [phone]  Will anyone else be joining your video visit? No          Assessment & Plan     Transaminitis  Likely still a bit reactive levels,continue PO and hydration.  updating labs.   Continue liver health and avoidance of meds-Tylenol/alcohol  - CBC with Platelets & Differential; Future  - Comprehensive metabolic panel; Future  - Ferritin; Future    Positive NISA (antinuclear antibody)  - chronic myalgia, has rheumatology appt.     Generalized anxiety disorder  Chronic stable, controlled, continue Effexor  - venlafaxine (EFFEXOR XR) 75 MG 24 hr capsule; Take 3 capsules (225 mg) by mouth daily    Pap next month.   Return to clinic with any new or worsening symptoms, and as needed.           MEDICATIONS:  Continue current medications without change    MANJULA Guerrero CNP  [facility] [place]    Lewis Jimenez is a 42 year old, presenting for the following health issues:  URI        9/7/2023    11:09 AM   Additional Questions   Roomed by emeka   Accompanied by self         9/7/2023    11:09 AM   Patient Reported Additional Medications   Patient reports taking the following new medications switched IUD       URI    History of Present Illness       Reason for visit:  Fatigue and no appetite      Pt says from when she was sick back in August she still is fatigued and has no appetite.   Viral syndrome with UTI symptoms, had neutropenic fever with transaminitis.   + NISA, 1: 80, dense speckled. - seeing [facility] Rheumatologist in Dec.  Still low appetite and nausea- using zofran is getting PO- just lowered.     Has upcoming pap.    Is working, is transitioning to in person work vs. Remote.     Mood- fair, on Effexor, needs refill- hard time sleeping without it.         No fevers,  normal BM's- occ. Constipation.             Review of Systems   Constitutional, HEENT, cardiovascular, pulmonary, gi and gu systems are negative, except as otherwise noted.      Objective    Vitals - Patient Reported  Pain Score: No Pain (0)        Physical Exam   GENERAL: Healthy, alert and no distress  EYES: Eyes grossly normal to inspection.  No discharge or erythema, or obvious scleral/conjunctival abnormalities.  RESP: No audible wheeze, cough, or visible cyanosis.  No visible retractions or increased work of breathing.    SKIN: Visible skin clear. No significant rash, abnormal pigmentation or lesions.  NEURO: Cranial nerves grossly intact.  Mentation and speech appropriate for age.  PSYCH: Mentation appears normal, affect normal/bright, judgement and insight intact, normal speech and appearance well-groomed.    Lab on 08/15/2023   Component Date Value Ref Range Status    Protein Total 08/15/2023 7.2  6.4 - 8.3 g/dL Final    Albumin 08/15/2023 4.6  3.5 - 5.2 g/dL Final    Bilirubin Total 08/15/2023 0.6  <=1.2 mg/dL Final    Alkaline Phosphatase 08/15/2023 110 (H)  35 - 104 U/L Final    AST 08/15/2023 30  0 - 45 U/L Final    Reference intervals for this test were updated on 6/12/2023 to more accurately reflect our healthy population. There may be differences in the flagging of prior results with similar values performed with this method. Interpretation of those prior results can be made in the context of the updated reference intervals.    ALT 08/15/2023 104 (H)  0 - 50 U/L Final    Reference intervals for this test were updated on 6/12/2023 to more accurately reflect our healthy population. There may be differences in the flagging of prior results with similar values performed with this method. Interpretation of those prior results can be made in the context of the updated reference intervals.      Bilirubin Direct 08/15/2023 <0.20  0.00 - 0.30 mg/dL Final               Video-Visit Details    Type of service:   Video Visit     Originating Location (pt. Location): Home    Distant Location (provider location):  Off-site  Platform used for Video Visit: Macie

## 2023-09-08 ENCOUNTER — LAB (OUTPATIENT)
Dept: LAB | Facility: CLINIC | Age: 43
End: 2023-09-08
Payer: COMMERCIAL

## 2023-09-08 DIAGNOSIS — R74.01 TRANSAMINITIS: ICD-10-CM

## 2023-09-08 LAB
ALBUMIN SERPL BCG-MCNC: 4.3 G/DL (ref 3.5–5.2)
ALP SERPL-CCNC: 69 U/L (ref 35–104)
ALT SERPL W P-5'-P-CCNC: 11 U/L (ref 0–50)
ANION GAP SERPL CALCULATED.3IONS-SCNC: 9 MMOL/L (ref 7–15)
AST SERPL W P-5'-P-CCNC: 17 U/L (ref 0–45)
BASOPHILS # BLD AUTO: 0 10E3/UL (ref 0–0.2)
BASOPHILS NFR BLD AUTO: 1 %
BILIRUB SERPL-MCNC: 0.3 MG/DL
BUN SERPL-MCNC: 12.9 MG/DL (ref 6–20)
CALCIUM SERPL-MCNC: 9.4 MG/DL (ref 8.6–10)
CHLORIDE SERPL-SCNC: 103 MMOL/L (ref 98–107)
CREAT SERPL-MCNC: 0.8 MG/DL (ref 0.51–0.95)
DEPRECATED HCO3 PLAS-SCNC: 23 MMOL/L (ref 22–29)
EGFRCR SERPLBLD CKD-EPI 2021: >90 ML/MIN/1.73M2
EOSINOPHIL # BLD AUTO: 0.1 10E3/UL (ref 0–0.7)
EOSINOPHIL NFR BLD AUTO: 1 %
ERYTHROCYTE [DISTWIDTH] IN BLOOD BY AUTOMATED COUNT: 13 % (ref 10–15)
FERRITIN SERPL-MCNC: 160 NG/ML (ref 6–175)
GLUCOSE SERPL-MCNC: 88 MG/DL (ref 70–99)
HCT VFR BLD AUTO: 36 % (ref 35–47)
HGB BLD-MCNC: 11.9 G/DL (ref 11.7–15.7)
IMM GRANULOCYTES # BLD: 0 10E3/UL
IMM GRANULOCYTES NFR BLD: 0 %
LYMPHOCYTES # BLD AUTO: 1.4 10E3/UL (ref 0.8–5.3)
LYMPHOCYTES NFR BLD AUTO: 17 %
MCH RBC QN AUTO: 29.3 PG (ref 26.5–33)
MCHC RBC AUTO-ENTMCNC: 33.1 G/DL (ref 31.5–36.5)
MCV RBC AUTO: 89 FL (ref 78–100)
MONOCYTES # BLD AUTO: 0.5 10E3/UL (ref 0–1.3)
MONOCYTES NFR BLD AUTO: 6 %
NEUTROPHILS # BLD AUTO: 6.2 10E3/UL (ref 1.6–8.3)
NEUTROPHILS NFR BLD AUTO: 75 %
PLATELET # BLD AUTO: 244 10E3/UL (ref 150–450)
POTASSIUM SERPL-SCNC: 4 MMOL/L (ref 3.4–5.3)
PROT SERPL-MCNC: 7.3 G/DL (ref 6.4–8.3)
RBC # BLD AUTO: 4.06 10E6/UL (ref 3.8–5.2)
SODIUM SERPL-SCNC: 135 MMOL/L (ref 136–145)
WBC # BLD AUTO: 8.3 10E3/UL (ref 4–11)

## 2023-09-08 PROCEDURE — 36415 COLL VENOUS BLD VENIPUNCTURE: CPT

## 2023-09-08 PROCEDURE — 85025 COMPLETE CBC W/AUTO DIFF WBC: CPT

## 2023-09-08 PROCEDURE — 80053 COMPREHEN METABOLIC PANEL: CPT

## 2023-09-08 PROCEDURE — 82728 ASSAY OF FERRITIN: CPT

## 2023-10-16 ENCOUNTER — OFFICE VISIT (OUTPATIENT)
Dept: FAMILY MEDICINE | Facility: CLINIC | Age: 43
End: 2023-10-16
Payer: COMMERCIAL

## 2023-10-16 VITALS
TEMPERATURE: 99.2 F | BODY MASS INDEX: 25.07 KG/M2 | DIASTOLIC BLOOD PRESSURE: 64 MMHG | WEIGHT: 141.5 LBS | SYSTOLIC BLOOD PRESSURE: 98 MMHG | RESPIRATION RATE: 18 BRPM | HEART RATE: 84 BPM | OXYGEN SATURATION: 97 % | HEIGHT: 63 IN

## 2023-10-16 DIAGNOSIS — Z12.31 ENCOUNTER FOR SCREENING MAMMOGRAM FOR BREAST CANCER: ICD-10-CM

## 2023-10-16 DIAGNOSIS — J30.1 SEASONAL ALLERGIC RHINITIS DUE TO POLLEN: ICD-10-CM

## 2023-10-16 DIAGNOSIS — R79.89 ELEVATED PROLACTIN LEVEL: ICD-10-CM

## 2023-10-16 DIAGNOSIS — Z30.9 ENCOUNTER FOR CONTRACEPTIVE MANAGEMENT, UNSPECIFIED TYPE: ICD-10-CM

## 2023-10-16 DIAGNOSIS — F41.1 GENERALIZED ANXIETY DISORDER: ICD-10-CM

## 2023-10-16 DIAGNOSIS — Z00.00 ROUTINE GENERAL MEDICAL EXAMINATION AT A HEALTH CARE FACILITY: Primary | ICD-10-CM

## 2023-10-16 DIAGNOSIS — Z12.4 CERVICAL CANCER SCREENING: ICD-10-CM

## 2023-10-16 DIAGNOSIS — G47.9 DISTURBANCE IN SLEEP BEHAVIOR: ICD-10-CM

## 2023-10-16 DIAGNOSIS — N92.0 MENORRHAGIA WITH REGULAR CYCLE: ICD-10-CM

## 2023-10-16 DIAGNOSIS — R53.83 OTHER FATIGUE: ICD-10-CM

## 2023-10-16 LAB
BASO+EOS+MONOS # BLD AUTO: ABNORMAL 10*3/UL
BASO+EOS+MONOS NFR BLD AUTO: ABNORMAL %
BASOPHILS # BLD AUTO: 0 10E3/UL (ref 0–0.2)
BASOPHILS NFR BLD AUTO: 1 %
CHOLEST SERPL-MCNC: 143 MG/DL
EOSINOPHIL # BLD AUTO: 0.1 10E3/UL (ref 0–0.7)
EOSINOPHIL NFR BLD AUTO: 2 %
ERYTHROCYTE [DISTWIDTH] IN BLOOD BY AUTOMATED COUNT: 13.2 % (ref 10–15)
HCT VFR BLD AUTO: 29.7 % (ref 35–47)
HDLC SERPL-MCNC: 43 MG/DL
HGB BLD-MCNC: 9.8 G/DL (ref 11.7–15.7)
IMM GRANULOCYTES # BLD: 0 10E3/UL
IMM GRANULOCYTES NFR BLD: 0 %
LDLC SERPL CALC-MCNC: 85 MG/DL
LYMPHOCYTES # BLD AUTO: 2.1 10E3/UL (ref 0.8–5.3)
LYMPHOCYTES NFR BLD AUTO: 28 %
MCH RBC QN AUTO: 29.6 PG (ref 26.5–33)
MCHC RBC AUTO-ENTMCNC: 33 G/DL (ref 31.5–36.5)
MCV RBC AUTO: 90 FL (ref 78–100)
MONOCYTES # BLD AUTO: 0.6 10E3/UL (ref 0–1.3)
MONOCYTES NFR BLD AUTO: 8 %
NEUTROPHILS # BLD AUTO: 4.6 10E3/UL (ref 1.6–8.3)
NEUTROPHILS NFR BLD AUTO: 62 %
NONHDLC SERPL-MCNC: 100 MG/DL
PLATELET # BLD AUTO: 251 10E3/UL (ref 150–450)
PROLACTIN SERPL 3RD IS-MCNC: 40 NG/ML (ref 5–23)
RBC # BLD AUTO: 3.31 10E6/UL (ref 3.8–5.2)
TRIGL SERPL-MCNC: 74 MG/DL
TSH SERPL DL<=0.005 MIU/L-ACNC: 2.51 UIU/ML (ref 0.3–4.2)
WBC # BLD AUTO: 7.4 10E3/UL (ref 4–11)

## 2023-10-16 PROCEDURE — 80061 LIPID PANEL: CPT | Performed by: NURSE PRACTITIONER

## 2023-10-16 PROCEDURE — 90471 IMMUNIZATION ADMIN: CPT | Performed by: NURSE PRACTITIONER

## 2023-10-16 PROCEDURE — 86200 CCP ANTIBODY: CPT | Performed by: NURSE PRACTITIONER

## 2023-10-16 PROCEDURE — 90686 IIV4 VACC NO PRSV 0.5 ML IM: CPT | Performed by: NURSE PRACTITIONER

## 2023-10-16 PROCEDURE — 84146 ASSAY OF PROLACTIN: CPT | Performed by: NURSE PRACTITIONER

## 2023-10-16 PROCEDURE — 36415 COLL VENOUS BLD VENIPUNCTURE: CPT | Performed by: NURSE PRACTITIONER

## 2023-10-16 PROCEDURE — 86431 RHEUMATOID FACTOR QUANT: CPT | Performed by: NURSE PRACTITIONER

## 2023-10-16 PROCEDURE — 99214 OFFICE O/P EST MOD 30 MIN: CPT | Mod: 25 | Performed by: NURSE PRACTITIONER

## 2023-10-16 PROCEDURE — G0145 SCR C/V CYTO,THINLAYER,RESCR: HCPCS | Performed by: NURSE PRACTITIONER

## 2023-10-16 PROCEDURE — 87624 HPV HI-RISK TYP POOLED RSLT: CPT | Performed by: NURSE PRACTITIONER

## 2023-10-16 PROCEDURE — 85025 COMPLETE CBC W/AUTO DIFF WBC: CPT | Performed by: NURSE PRACTITIONER

## 2023-10-16 PROCEDURE — 99396 PREV VISIT EST AGE 40-64: CPT | Mod: 25 | Performed by: NURSE PRACTITIONER

## 2023-10-16 PROCEDURE — 84443 ASSAY THYROID STIM HORMONE: CPT | Performed by: NURSE PRACTITIONER

## 2023-10-16 RX ORDER — MONTELUKAST SODIUM 10 MG/1
1 TABLET ORAL AT BEDTIME
Qty: 90 TABLET | Refills: 2 | Status: SHIPPED | OUTPATIENT
Start: 2023-10-16

## 2023-10-16 RX ORDER — BUSPIRONE HYDROCHLORIDE 15 MG/1
15 TABLET ORAL 2 TIMES DAILY
Qty: 180 TABLET | Refills: 3 | Status: SHIPPED | OUTPATIENT
Start: 2023-10-16

## 2023-10-16 RX ORDER — TRAZODONE HYDROCHLORIDE 50 MG/1
TABLET, FILM COATED ORAL
Qty: 90 TABLET | Refills: 3 | Status: SHIPPED | OUTPATIENT
Start: 2023-10-16

## 2023-10-16 ASSESSMENT — ENCOUNTER SYMPTOMS
CONSTIPATION: 0
HEMATURIA: 0
COUGH: 0
MYALGIAS: 0
HEARTBURN: 0
EYE PAIN: 0
HEADACHES: 0
FEVER: 0
DYSURIA: 0
ABDOMINAL PAIN: 0
NERVOUS/ANXIOUS: 0
DIARRHEA: 0
ARTHRALGIAS: 0
HEMATOCHEZIA: 0
SHORTNESS OF BREATH: 0
JOINT SWELLING: 0
PARESTHESIAS: 0
WEAKNESS: 0
PALPITATIONS: 0
FREQUENCY: 0
BREAST MASS: 0
CHILLS: 0
DIZZINESS: 0
NAUSEA: 0
SORE THROAT: 0

## 2023-10-16 ASSESSMENT — PAIN SCALES - GENERAL: PAINLEVEL: NO PAIN (0)

## 2023-10-16 NOTE — PROGRESS NOTES
SUBJECTIVE:   CC: Barbara is an 43 year old who presents for preventive health visit.       10/16/2023     1:13 PM   Additional Questions   Roomed by Isi CASTLE   Accompanied by None         10/16/2023     1:13 PM   Patient Reported Additional Medications   Patient reports taking the following new medications NA     Pt is wondering why prolactin levels would be high - concern for perimenopause-provider river prolactin level and was slightly elevated.  Rechecking today.    Patient has positive NISA, drawn labs for rheumatoid arthritis.  Has follow-up with rheumatology next month.  Recently was hospitalized with transaminitis thought to be febrile induced illness with elevated liver function test possibly from antibiotic use.  This has resolved.  Patient would like rheumatoid studies drawn.    Healthy Habits:     Getting at least 3 servings of Calcium per day:  NO    Bi-annual eye exam:  Yes    Dental care twice a year:  Yes    Sleep apnea or symptoms of sleep apnea:  None    Diet:  Regular (no restrictions)    Frequency of exercise:  1 day/week    Duration of exercise:  Less than 15 minutes    Taking medications regularly:  Yes    Medication side effects:  None    Additional concerns today:  Yes      Current ParaGard IUD, periods are regular.  Lasting approximately 5 to 7 days.  Patient states she is on the end of her menstrual cycle, and would like Pap completed today.  She is not noting desire for any future pregnancies.              Social History     Tobacco Use    Smoking status: Former     Packs/day: 0.00     Years: 15.00     Additional pack years: 0.00     Total pack years: 0.00     Types: Cigarettes    Smokeless tobacco: Never    Tobacco comments:     quit date 8/2010    Substance Use Topics    Alcohol use: Yes     Comment: 0-2 per week              10/16/2023     1:11 PM   Alcohol Use   Prescreen: >3 drinks/day or >7 drinks/week? No     Reviewed orders with patient.  Reviewed health maintenance and updated  orders accordingly - Yes  Lab work is in process    Breast Cancer Screening:    FHS-7:       10/15/2021     3:05 PM 10/20/2022     3:22 PM   Breast CA Risk Assessment (FHS-7)   Did any of your first-degree relatives have breast or ovarian cancer? No No   Did any of your relatives have bilateral breast cancer? No No   Did any man in your family have breast cancer? No No   Did any woman in your family have breast and ovarian cancer? No No   Did any woman in your family have breast cancer before age 50 y? No No   Do you have 2 or more relatives with breast and/or ovarian cancer? No No   Do you have 2 or more relatives with breast and/or bowel cancer? No No       Mammogram Screening - Offered annual screening and updated Health Maintenance for [place] plan based on risk factor consideration  Pertinent mammograms are reviewed under the imaging tab.    History of abnormal Pap smear: NO - age 30- 65 PAP every 3 years recommended  YES - other categories - see link Cervical Cytology Screening Guidelines      Latest Ref Rng & Units 10/13/2022    11:40 AM 9/20/2021    12:26 PM 5/5/2020    11:45 AM   PAP / HPV   PAP  Negative for Intraepithelial Lesion or Malignancy (NILM)  Low-grade squamous intraepithelial lesion (LSIL) encompassing HPV/mild dysplasia/CIN1     PAP (Historical)    NIL    HPV 16 DNA Negative Negative  Negative     HPV 18 DNA Negative Negative  Negative     Other HR HPV Negative Positive  Positive       Reviewed and updated as needed this visit by clinical staff   Tobacco  Allergies  Meds              Reviewed and updated as needed this visit by Provider                     Review of Systems   Constitutional:  Negative for chills and fever.   HENT:  Negative for congestion, ear pain, hearing loss and sore throat.    Eyes:  Negative for pain and visual disturbance.   Respiratory:  Negative for cough and shortness of breath.    Cardiovascular:  Negative for chest pain, palpitations and peripheral edema.  "  Gastrointestinal:  Negative for abdominal pain, constipation, diarrhea, heartburn, hematochezia and nausea.   Breasts:  Positive for tenderness. Negative for breast mass and discharge.   Genitourinary:  Positive for vaginal bleeding. Negative for dysuria, frequency, genital sores, hematuria, pelvic pain, urgency and vaginal discharge.   Musculoskeletal:  Negative for arthralgias, joint swelling and myalgias.   Skin:  Negative for rash.   Neurological:  Negative for dizziness, weakness, headaches and paresthesias.   Psychiatric/Behavioral:  Negative for mood changes. The patient is not nervous/anxious.      On menses, breast tenderness not addressed today.     OBJECTIVE:   BP 98/64 (BP Location: Left arm, Patient Position: Chair, Cuff Size: Adult Regular)   Pulse 84   Temp 99.2  F (37.3  C) (Temporal)   Resp 18   Ht 1.595 m (5' 2.8\")   Wt 64.2 kg (141 lb 8 oz)   LMP 08/30/2023 (Approximate)   SpO2 97%   Breastfeeding No   BMI 25.23 kg/m    Physical Exam  GENERAL: healthy, alert and no distress  EYES: Eyes grossly normal to inspection, PERRL and conjunctivae and sclerae normal  HENT: ear canals and TM's normal, nose and mouth without ulcers or lesions  NECK: no adenopathy, no asymmetry, masses, or scars and thyroid normal to palpation  RESP: lungs clear to auscultation - no rales, rhonchi or wheezes  BREAST: normal without masses, tenderness or nipple discharge and no palpable axillary masses or adenopathy  CV: regular rate and rhythm, normal S1 S2, no S3 or S4, no murmur, click or rub, no peripheral edema and peripheral pulses strong  ABDOMEN: soft, nontender, no hepatosplenomegaly, no masses and bowel sounds normal   (female): normal female external genitalia, normal urethral meatus , vaginal mucosa pink, moist, well rugated, normal cervix, adnexae, and uterus without masses., and ParaGard IUD strings present.  On initiation on exam patient had copious amounts of menstrual blood in the vaginal vault.  " Would have easily soaked through 1 whole pad.  MS: no gross musculoskeletal defects noted, no edema  SKIN: no suspicious lesions or rashes  NEURO: Normal strength and tone, mentation intact and speech normal  PSYCH: mentation appears normal, affect normal/bright    Diagnostic Test Results:  Labs reviewed in Epic  Repeating prolactin level.  Getting CBC due to high amount of menstrual flow.    ASSESSMENT/PLAN:       ICD-10-CM    1. Routine general medical examination at a health care facility  Z00.00 TSH with free T4 reflex     Lipid panel reflex to direct LDL Fasting     TSH with free T4 reflex     Lipid panel reflex to direct LDL Fasting      2. Cervical cancer screening  Z12.4 Pap Screen with HPV - recommended age 30 - 65 years      3. Elevated prolactin level  R79.89 Prolactin     Prolactin      4. Other fatigue  R53.83 Rheumatoid factor     Cyclic Citrullinated Peptide Antibody IgG     Rheumatoid factor     Cyclic Citrullinated Peptide Antibody IgG      5. Generalized anxiety disorder  F41.1 busPIRone (BUSPAR) 15 MG tablet      6. Seasonal allergic rhinitis due to pollen  J30.1 montelukast (SINGULAIR) 10 MG tablet      7. Disturbance in sleep behavior  G47.9 traZODone (DESYREL) 50 MG tablet      8. Encounter for screening mammogram for breast cancer  Z12.31 *MA Screening Digital Bilateral      9. Menorrhagia with regular cycle  N92.0 Ob/Gyn  Referral     CBC with Platelets & Differential     CBC with Platelets & Differential      10. Encounter for contraceptive management, unspecified type  Z30.9 Ob/Gyn  Referral          Patient has been advised of split billing requirements and indicates understanding: Yes      COUNSELING:  Reviewed preventive health counseling, as reflected in patient instructions       Regular exercise       Healthy diet/nutrition       Immunizations  Vaccinated for: Influenza, HPV         Contraception       -Discussed heavy menstrual flow and CBC.  Gynecology referral to  consider NovaSure/ablation and sterilization.- anemai with menses- on iron.     Mood is stable.  Updating medications and trazodone.  Patient has inhaler to pharmacy.  Labs reviewed.  Rechecking prolactin level and rheumatoid labs.  We will follow-up with abnormal results.  Consideration of MRI with abnormal prolactin level.  Patient is on SNRI, not noted to cause increased prolactin level with literature.    Rheumatoid labs as requested with previous elevated NISA.    Allergic rhinitis- on Singulair, refilled.     Meds refilled.   Updating labs.       She reports that she has quit smoking. Her smoking use included cigarettes. She has never used smokeless tobacco.        MANJULA Guerrero CNP  [facility]

## 2023-10-17 LAB
CCP AB SER IA-ACNC: 1.4 U/ML
RHEUMATOID FACT SER NEPH-ACNC: 6 IU/ML

## 2023-10-17 NOTE — RESULT ENCOUNTER NOTE
Anemia is noted. Prolactin level is high. I recommend an MRI of the brain to be sure there is not growth.   I have ordered this, and a  will reach out to call you.   Cholesterol is very good, and thyroid is normal.   Sincerely,   Nicole Soto, DNP

## 2023-10-20 LAB
HUMAN PAPILLOMA VIRUS 16 DNA: NEGATIVE
HUMAN PAPILLOMA VIRUS 18 DNA: NEGATIVE
HUMAN PAPILLOMA VIRUS FINAL DIAGNOSIS: NORMAL
HUMAN PAPILLOMA VIRUS OTHER HR: NEGATIVE

## 2023-10-23 ENCOUNTER — PATIENT OUTREACH (OUTPATIENT)
Dept: FAMILY MEDICINE | Facility: CLINIC | Age: 43
End: 2023-10-23
Payer: COMMERCIAL

## 2023-11-02 ENCOUNTER — ANCILLARY PROCEDURE (OUTPATIENT)
Dept: MRI IMAGING | Facility: CLINIC | Age: 43
End: 2023-11-02
Attending: NURSE PRACTITIONER
Payer: COMMERCIAL

## 2023-11-02 DIAGNOSIS — R79.89 ELEVATED PROLACTIN LEVEL: Primary | ICD-10-CM

## 2023-11-02 DIAGNOSIS — R79.89 ELEVATED PROLACTIN LEVEL: ICD-10-CM

## 2023-11-02 PROCEDURE — 70553 MRI BRAIN STEM W/O & W/DYE: CPT | Performed by: RADIOLOGY

## 2023-11-02 PROCEDURE — A9585 GADOBUTROL INJECTION: HCPCS | Mod: JZ | Performed by: RADIOLOGY

## 2023-11-02 RX ORDER — GADOBUTROL 604.72 MG/ML
6 INJECTION INTRAVENOUS ONCE
Status: COMPLETED | OUTPATIENT
Start: 2023-11-02 | End: 2023-11-02

## 2023-11-02 RX ADMIN — GADOBUTROL 6 ML: 604.72 INJECTION INTRAVENOUS at 09:23

## 2023-11-17 DIAGNOSIS — F32.9 MAJOR DEPRESSION: Primary | ICD-10-CM

## 2023-11-17 RX ORDER — VENLAFAXINE HYDROCHLORIDE 150 MG/1
150 CAPSULE, EXTENDED RELEASE ORAL DAILY
Qty: 90 CAPSULE | Refills: 3 | Status: SHIPPED | OUTPATIENT
Start: 2023-11-17

## 2023-11-21 ENCOUNTER — ANCILLARY PROCEDURE (OUTPATIENT)
Dept: MAMMOGRAPHY | Facility: CLINIC | Age: 43
End: 2023-11-21
Attending: NURSE PRACTITIONER
Payer: COMMERCIAL

## 2023-11-21 DIAGNOSIS — Z12.31 ENCOUNTER FOR SCREENING MAMMOGRAM FOR BREAST CANCER: ICD-10-CM

## 2023-11-21 PROCEDURE — 77067 SCR MAMMO BI INCL CAD: CPT | Performed by: STUDENT IN AN ORGANIZED HEALTH CARE EDUCATION/TRAINING PROGRAM

## 2023-11-21 PROCEDURE — 77063 BREAST TOMOSYNTHESIS BI: CPT | Performed by: STUDENT IN AN ORGANIZED HEALTH CARE EDUCATION/TRAINING PROGRAM

## 2023-11-24 NOTE — PATIENT INSTRUCTIONS
If you have labs or imaging done, the results will automatically release in [facility] without an interpretation.  Your health care professional will review those results and send an interpretation with recommendations as soon as possible, but this may be 1-3 business days.    If you have any questions regarding your visit, please contact your care team.     [facility]: [phone]  [facility] CLINIC HOURS TELEPHONE NUMBER   DO. Radha Adames -Surgery Scheduler  Dodie -     RACHEL Cruz RN Kylie, RN [place]    Monday 8:30 am-5:00 pm  Wednesday 8:30 am-5:00 pm  Friday 8:30 am-5:00 pm    Typical Surgery day: Tuesday [facility]  [address] [place] [address]  Phone:  [phone]  Fax: [phone]   Appointment [phone]    Imaging Scheduling-All Locations [phone]    [facility] and Delivery  [address] [place] [address] [place] [address] [phone]   **Surgeries** Our Surgery Schedulers will contact you to schedule. If you do not receive a call within 3 business days, please call [phone].  Urgent Care locations:  [facility] Monday-Friday   10 am - 8 pm  Saturday and    9 am - 5 pm [phone]   If you need a medication refill, please contact your pharmacy. Please allow 3 business days for your refill to be completed.  As always, Thank you for trusting us with your healthcare needs!  see additional instructions from your care team below

## 2023-11-27 ENCOUNTER — OFFICE VISIT (OUTPATIENT)
Dept: OBGYN | Facility: CLINIC | Age: 43
End: 2023-11-27
Attending: NURSE PRACTITIONER
Payer: COMMERCIAL

## 2023-11-27 VITALS
WEIGHT: 140.9 LBS | HEART RATE: 84 BPM | BODY MASS INDEX: 25.12 KG/M2 | SYSTOLIC BLOOD PRESSURE: 100 MMHG | OXYGEN SATURATION: 97 % | DIASTOLIC BLOOD PRESSURE: 63 MMHG

## 2023-11-27 DIAGNOSIS — Z30.9 ENCOUNTER FOR CONTRACEPTIVE MANAGEMENT, UNSPECIFIED TYPE: ICD-10-CM

## 2023-11-27 DIAGNOSIS — N92.0 MENORRHAGIA WITH REGULAR CYCLE: Primary | ICD-10-CM

## 2023-11-27 LAB
FERRITIN SERPL-MCNC: 22 NG/ML (ref 6–175)
HGB BLD-MCNC: 9.6 G/DL (ref 11.7–15.7)

## 2023-11-27 PROCEDURE — 82728 ASSAY OF FERRITIN: CPT | Performed by: OBSTETRICS & GYNECOLOGY

## 2023-11-27 PROCEDURE — 99214 OFFICE O/P EST MOD 30 MIN: CPT | Performed by: OBSTETRICS & GYNECOLOGY

## 2023-11-27 PROCEDURE — 36415 COLL VENOUS BLD VENIPUNCTURE: CPT | Performed by: OBSTETRICS & GYNECOLOGY

## 2023-11-27 PROCEDURE — 85018 HEMOGLOBIN: CPT | Performed by: OBSTETRICS & GYNECOLOGY

## 2023-11-27 ASSESSMENT — ASTHMA QUESTIONNAIRES: ACT_TOTALSCORE: 23

## 2023-11-27 NOTE — PROGRESS NOTES
"This 44 y/o female,  (vacuum-assisted vaginal delivery), presents today c/o menorrhagia issues since removal of her Kyleena IUD followed by insertion of a Paragard IUD on 2023.  Menarche was at age 13-14 and she feels that these heavier periods started at age 16.  She used the BCP for treatment until trying for pregnancy.  She then had a Kyleena IUD placed with resultant amenorrhea.  She was interested in having an endometrial ablation performed but is considering another pregnancy in the future so this procedure would not be advised until she has completed her family.  She describes the first 4 days of her menses as \"heavy\" where she has to change a mariya pad every 1.5 hours or so.  For the remainder of her menses, she has to change a pad every 3-4 hours.  She has been feeling tired so would like to be checked for possible anemia.  She will be following up with Endocrine due to unexplained hyperprolactinemia.  She is being seen by Derm for acne issues as well as Rheumatology.  She recently was started on Progesterone 25 mg (compounded) which was increased to 100 mg po for supplementation.    /63 (BP Location: Right arm, Cuff Size: Adult Regular)   Pulse 84   Wt 63.9 kg (140 lb 14.4 oz)   LMP 2023 (Approximate)   SpO2 97%   BMI 25.12 kg/m    ROS:  10 systems were reviewed and the positives were listed under problems.  PE:  General- Healthy-appearing female in no acute distress  Eyes- No scleral injection or icterus  ENT- Mucus membranes moist  CV- No noticeable edema in extremities  Lymph- No noticeable cervical adenopathy  Respiratory- Non-labored breathing  Skin- No suspicious lesions or rashes visualized  Psych- Normal affect  Assessment - Menorrhagia, fatigue  Plan - We discussed the fact that her heavier periods began shortly after switching from the hormonal IUD (Kyleena), to the non hormonal IUD (Paragard) which is to be expected when going from a hormonal to a non hormonal IUD.  " Will have her follow up with Endocrine in regard to her hyperprolactinemic state since this could impact her menses.  Will also check hgb and ferritin values and treat if abnormal.  Will schedule a pelvic US to evaluate for the etiology of her heavy bleeding issue.  Her PCP in [place] is managing her compounded progesterone medication.  I explained that an endometrial ablation procedure would not be advised at this time because she wants a future pregnancy.  30 minutes were spent today in chart review, the patient visit, review of tests, and documentation in regard to the issues noted above.

## 2023-11-28 ENCOUNTER — ANCILLARY PROCEDURE (OUTPATIENT)
Dept: ULTRASOUND IMAGING | Facility: CLINIC | Age: 43
End: 2023-11-28
Attending: OBSTETRICS & GYNECOLOGY
Payer: COMMERCIAL

## 2023-11-28 DIAGNOSIS — N92.0 MENORRHAGIA WITH REGULAR CYCLE: ICD-10-CM

## 2023-11-28 PROCEDURE — 76856 US EXAM PELVIC COMPLETE: CPT | Performed by: RADIOLOGY

## 2023-11-28 PROCEDURE — 76830 TRANSVAGINAL US NON-OB: CPT | Performed by: RADIOLOGY

## 2023-11-29 ENCOUNTER — TELEPHONE (OUTPATIENT)
Dept: OBGYN | Facility: CLINIC | Age: 43
End: 2023-11-29
Payer: COMMERCIAL

## 2023-11-29 LAB — RADIOLOGIST FLAGS: ABNORMAL

## 2023-11-30 NOTE — TELEPHONE ENCOUNTER
I called this patient and left a message on her recorder since she did not .  I advised that she use backup spermicidal foam and condoms for contraception since she can no longer rely on this IUD since it is not in the correct position.  She is to return my call with any questions and is to schedule a clinic visit for removal.  We can then discuss her birth control options at that time.

## 2024-05-08 ENCOUNTER — OFFICE VISIT (OUTPATIENT)
Dept: OBGYN | Facility: CLINIC | Age: 44
End: 2024-05-08
Payer: COMMERCIAL

## 2024-05-08 VITALS
OXYGEN SATURATION: 100 % | SYSTOLIC BLOOD PRESSURE: 112 MMHG | HEART RATE: 85 BPM | WEIGHT: 142 LBS | BODY MASS INDEX: 25.32 KG/M2 | DIASTOLIC BLOOD PRESSURE: 75 MMHG

## 2024-05-08 DIAGNOSIS — N92.0 MENORRHAGIA WITH REGULAR CYCLE: ICD-10-CM

## 2024-05-08 DIAGNOSIS — Z30.430 ENCOUNTER FOR IUD INSERTION: Primary | ICD-10-CM

## 2024-05-08 DIAGNOSIS — Z30.432 ENCOUNTER FOR IUD REMOVAL: ICD-10-CM

## 2024-05-08 LAB
HCG UR QL: NEGATIVE
INTERNAL QC OK POCT: NORMAL
POCT KIT EXPIRATION DATE: NORMAL
POCT KIT LOT NUMBER: NORMAL

## 2024-05-08 PROCEDURE — 58300 INSERT INTRAUTERINE DEVICE: CPT | Performed by: OBSTETRICS & GYNECOLOGY

## 2024-05-08 PROCEDURE — 58301 REMOVE INTRAUTERINE DEVICE: CPT | Performed by: OBSTETRICS & GYNECOLOGY

## 2024-05-08 PROCEDURE — 81025 URINE PREGNANCY TEST: CPT | Performed by: OBSTETRICS & GYNECOLOGY

## 2024-05-08 PROCEDURE — 99214 OFFICE O/P EST MOD 30 MIN: CPT | Mod: 25 | Performed by: OBSTETRICS & GYNECOLOGY

## 2024-05-08 NOTE — PATIENT INSTRUCTIONS
If you have labs or imaging done, the results will automatically release in [facility] without an interpretation.  Your health care professional will review those results and send an interpretation with recommendations as soon as possible, but this may be 1-3 business days.    If you have any questions regarding your visit, please contact your care team.     [facility]: [phone]  [facility] CLINIC HOURS TELEPHONE NUMBER   DO. Radha Adames -Surgery Scheduler  Dodie -     RACHEL Cruz RN Kylie, RN [place]    Monday 8:30 am-5:00 pm  Wednesday 8:30 am-5:00 pm  Friday 8:30 am-5:00 pm    Typical Surgery day: Tuesday [facility]  [address] [place] [address]  Phone:  [phone]  Fax: [phone]   Appointment [phone]    Imaging Scheduling-All Locations [phone]    [facility] Labor and Delivery  [address] [place] [address] [place] [address] [phone]   **Surgeries** Our Surgery Schedulers will contact you to schedule. If you do not receive a call within 3 business days, please call [phone].  Urgent Care locations:  [facility] Monday-Friday   10 am - 8 pm  Saturday and    9 am - 5 pm [phone]   If you need a medication refill, please contact your pharmacy. Please allow 3 business days for your refill to be completed.  As always, Thank you for trusting us with your healthcare needs!  see additional instructions from your care team below

## 2024-05-08 NOTE — PROGRESS NOTES
"This 44 y/o female, , s/p  , LMP 2024, presents for removal of her malpositioned ParaGard IUD per US on 2023 followed by insertion of a new hormonal IUD for both contraception and menstrual management.  This ParaGard IUD was inserted on 2023 so the patient was disappointed that she was unable to use it longer.  However, this IUD has not lightened her heavy menses so she prefers to go back to using a Kyleena IUD since she remained amenorrheic during use.  She has been experiencing hot flushes prior to insertion of the ParaGard so has been taking progesterone 200 mg po daily from a compounding pharmacy.  She feels that her menses have always been \"heavy\" since menarche at age 12.  She states that her progesterone value was 0 last 2023 when tested per lab so that is why her physician prescribed po progesterone.  However, her menses continue to be heavy where she has to change a maxi pad every 2 hours or less.  Her endometrial stripe thickness was 2 mm per recent US.  /75 (BP Location: Right arm, Patient Position: Chair, Cuff Size: Adult Regular)   Pulse 85   Wt 64.4 kg (142 lb)   LMP 2024   SpO2 100%   Breastfeeding No   BMI 25.32 kg/m    ROS:  10 systems were reviewed and the positives were listed under problems.  Informed consent was reviewed and obtained for IUD removal of her current ParaGard followed by insertion of a new Kyleena IUD.  We discussed all her treatment options but she prefers use of the Kyleena.  She is aware of the option of an endometrial ablation procedure but is \"not ready for this.\"   In the lithotomy position using sterile technique, a bi-valve speculum was placed and the two IUD strings were visible and both were grasped with a Ring forceps.  Her current IUD was removed without difficulty and she tolerated the procedure well.  The IUD was noted to be intact with two attached strings and was disposed of in a plastic specimen cup per " protocol.  Next, her cervix was cleansed with Betadine swabs x 3.  The 12 o'clock position of her cervix was then grasped with a single-toothed tenaculum and the internal os was dilated using an OsFinder.  Her anteverted uterus sounded to 7 cm and the Kyleena IUD was loaded and inserted without difficulty.  The two IUD strings were trimmed to 3 cm each and all instruments were removed.  Counts were correct and EBL was 0.  She tolerated the procedure well and there were no complications.    Assessment - IUD removal followed by new IUD insertion for contraception and menstrual management, long-standing history of menorrhagia  Plan - Instructions for use of her new Kyleena IUD were discussed and she was provided the brochure along with a reminder wallet card stating that removal would be due in May 2029.  She is to discontinue taking her po progesterone and call if she re-develops hot flushes.  However, she is hoping to avoid supplemental estrogen use.  30 minutes were spent today in chart review, the patient visit, review of tests, and documentation in regard to the issues noted above.  This did NOT include the time spent in the procedures (IUD removal followed by new IUD insertion).      Kyleena IUD NDC #00748-209-75  Exp Dec 2025  Lot #[id number]

## 2024-05-14 DIAGNOSIS — J45.30 MILD PERSISTENT ASTHMA WITHOUT COMPLICATION: ICD-10-CM

## 2024-05-15 RX ORDER — MOMETASONE FUROATE AND FORMOTEROL FUMARATE DIHYDRATE 200; 5 UG/1; UG/1
AEROSOL RESPIRATORY (INHALATION)
Qty: 13 G | Refills: 0 | Status: SHIPPED | OUTPATIENT
Start: 2024-05-15

## 2024-05-16 NOTE — TELEPHONE ENCOUNTER
----- Message from Jessica Mendoza MD sent at 5/16/2024  4:03 PM CDT -----  Inform patient normal     Patient Quality Outreach    Patient is due for the following:   Asthma  -  ACT needed and Asthma follow-up visit  Depression  -  PHQ-9 Needed and Depression follow-up visit  Immunizations  -  Covid and Pneumococcal    NEXT STEPS:   Schedule a office visit for medication recheck.Due 3/2022 per physical.    Type of outreach:    Sent [employer] message.      Questions for provider review:    None     Татьяна Bansal, [facility]

## 2024-05-24 ENCOUNTER — TRANSFERRED RECORDS (OUTPATIENT)
Dept: HEALTH INFORMATION MANAGEMENT | Facility: CLINIC | Age: 44
End: 2024-05-24
Payer: COMMERCIAL

## 2024-07-23 ENCOUNTER — E-VISIT (OUTPATIENT)
Dept: URGENT CARE | Facility: CLINIC | Age: 44
End: 2024-07-23
Payer: COMMERCIAL

## 2024-07-23 DIAGNOSIS — N39.0 ACUTE UTI (URINARY TRACT INFECTION): Primary | ICD-10-CM

## 2024-07-23 PROCEDURE — 99421 OL DIG E/M SVC 5-10 MIN: CPT | Performed by: NURSE PRACTITIONER

## 2024-07-23 RX ORDER — SULFAMETHOXAZOLE/TRIMETHOPRIM 800-160 MG
1 TABLET ORAL 2 TIMES DAILY
Qty: 6 TABLET | Refills: 0 | Status: SHIPPED | OUTPATIENT
Start: 2024-07-23 | End: 2024-07-26

## 2024-07-23 NOTE — PATIENT INSTRUCTIONS
Dear Barbara Yates    After reviewing your responses, I've been able to diagnose you with a urinary tract infection, which is a common infection of the bladder with bacteria.  This is not a sexually transmitted infection, though urinating immediately after intercourse can help prevent infections.  Drinking lots of fluids is also helpful to clear your current infection and prevent the next one.      I have sent a prescription for antibiotics to your pharmacy to treat this infection.    It is important that you take all of your prescribed medication even if your symptoms are improving after a few doses.  Taking all of your medicine helps prevent the symptoms from returning.     If your symptoms worsen, you develop pain in your back or stomach, develop fevers, or are not improving in 5 days, please contact your primary care provider for an appointment or visit any of our convenient Walk-in or Urgent Care Centers to be seen, which can be found on our website here.    Thanks again for choosing us as your health care partner,    Barbara Busby CNP  Urinary Tract Infection (UTI) in Women: Care Instructions  Overview     A urinary tract infection (UTI) is an infection caused by bacteria. It can happen anywhere in the urinary tract. A UTI can happen in the:  Kidneys.  Ureters, the tubes that connect the kidneys to the bladder.  Bladder.  Urethra, where the urine comes out.  Most UTIs are bladder infections. They often cause pain or burning when you urinate.  Most UTIs can be cured with antibiotics. If you are prescribed antibiotics, be sure to complete your treatment so that the infection does not get worse.  Follow-up care is a key part of your treatment and safety. Be sure to make and go to all appointments, and call your doctor if you are having problems. It's also a good idea to know your test results and keep a list of the medicines you take.  How can you care for yourself at home?  Take your antibiotics as  "directed. Do not stop taking them just because you feel better. You need to take the full course of antibiotics.  Drink extra water and other fluids for the next day or two. This will help make the urine less concentrated and help wash out the bacteria that are causing the infection. (If you have kidney, heart, or liver disease and have to limit fluids, talk with your doctor before you increase the amount of fluids you drink.)  Avoid drinks that are carbonated or have caffeine. They can irritate the bladder.  Urinate often. Try to empty your bladder each time.  To relieve pain, take a hot bath or lay a heating pad set on low over your lower belly or genital area. Never go to sleep with a heating pad in place.  To prevent UTIs  Drink plenty of water each day. This helps you urinate often, which clears bacteria from your system. (If you have kidney, heart, or liver disease and have to limit fluids, talk with your doctor before you increase the amount of fluids you drink.)  Urinate when you need to.  If you are sexually active, urinate right after you have sex.  Change sanitary pads often.  Avoid douches, bubble baths, feminine hygiene sprays, and other feminine hygiene products that have deodorants.  After going to the bathroom, wipe from front to back.  When should you call for help?   Call your doctor now or seek immediate medical care if:    You have new or worse fever, chills, nausea, or vomiting.     You have new pain in your back just below your rib cage. This is called flank pain.     There is new blood or pus in your urine.     You have any problems with your antibiotic medicine.   Watch closely for changes in your health, and be sure to contact your doctor if:    You are not getting better after taking an antibiotic for 2 days.     Your symptoms go away but then come back.   Where can you learn more?  Go to https://www.healthwise.net/patiented  Enter K848 in the search box to learn more about \"Urinary Tract " "Infection (UTI) in Women: Care Instructions.\"  Current as of: November 15, 2023               Content Version: 14.0    4165-8803 [employer].   Care instructions adapted under license by your healthcare professional. If you have questions about a medical condition or this instruction, always ask your healthcare professional. [employer] disclaims any warranty or liability for your use of this information.      "

## 2024-08-15 ENCOUNTER — MYC MEDICAL ADVICE (OUTPATIENT)
Dept: FAMILY MEDICINE | Facility: CLINIC | Age: 44
End: 2024-08-15
Payer: COMMERCIAL

## 2024-10-14 ENCOUNTER — OFFICE VISIT (OUTPATIENT)
Dept: FAMILY MEDICINE | Facility: CLINIC | Age: 44
End: 2024-10-14
Payer: COMMERCIAL

## 2024-10-14 VITALS
RESPIRATION RATE: 12 BRPM | SYSTOLIC BLOOD PRESSURE: 116 MMHG | HEART RATE: 88 BPM | BODY MASS INDEX: 26.22 KG/M2 | TEMPERATURE: 99.7 F | OXYGEN SATURATION: 100 % | HEIGHT: 63 IN | WEIGHT: 148 LBS | DIASTOLIC BLOOD PRESSURE: 70 MMHG

## 2024-10-14 DIAGNOSIS — N64.4 BREAST PAIN, LEFT: Primary | ICD-10-CM

## 2024-10-14 DIAGNOSIS — F41.1 GENERALIZED ANXIETY DISORDER: ICD-10-CM

## 2024-10-14 PROCEDURE — 99214 OFFICE O/P EST MOD 30 MIN: CPT

## 2024-10-14 RX ORDER — CEPHALEXIN 500 MG/1
500 CAPSULE ORAL 4 TIMES DAILY
Qty: 28 CAPSULE | Refills: 0 | Status: SHIPPED | OUTPATIENT
Start: 2024-10-14 | End: 2024-10-21

## 2024-10-14 RX ORDER — BUSPIRONE HYDROCHLORIDE 15 MG/1
15 TABLET ORAL 2 TIMES DAILY
Qty: 60 TABLET | Refills: 0 | Status: SHIPPED | OUTPATIENT
Start: 2024-10-14 | End: 2024-10-24

## 2024-10-14 ASSESSMENT — ASTHMA QUESTIONNAIRES
QUESTION_3 LAST FOUR WEEKS HOW OFTEN DID YOUR ASTHMA SYMPTOMS (WHEEZING, COUGHING, SHORTNESS OF BREATH, CHEST TIGHTNESS OR PAIN) WAKE YOU UP AT NIGHT OR EARLIER THAN USUAL IN THE MORNING: NOT AT ALL
QUESTION_1 LAST FOUR WEEKS HOW MUCH OF THE TIME DID YOUR ASTHMA KEEP YOU FROM GETTING AS MUCH DONE AT WORK, SCHOOL OR AT HOME: A LITTLE OF THE TIME
QUESTION_2 LAST FOUR WEEKS HOW OFTEN HAVE YOU HAD SHORTNESS OF BREATH: ONCE OR TWICE A WEEK
QUESTION_4 LAST FOUR WEEKS HOW OFTEN HAVE YOU USED YOUR RESCUE INHALER OR NEBULIZER MEDICATION (SUCH AS ALBUTEROL): ONCE A WEEK OR LESS
QUESTION_5 LAST FOUR WEEKS HOW WOULD YOU RATE YOUR ASTHMA CONTROL: WELL CONTROLLED
ACT_TOTALSCORE: 21
ACT_TOTALSCORE: 21

## 2024-10-14 ASSESSMENT — ANXIETY QUESTIONNAIRES
1. FEELING NERVOUS, ANXIOUS, OR ON EDGE: SEVERAL DAYS
2. NOT BEING ABLE TO STOP OR CONTROL WORRYING: NOT AT ALL
GAD7 TOTAL SCORE: 5
7. FEELING AFRAID AS IF SOMETHING AWFUL MIGHT HAPPEN: SEVERAL DAYS
IF YOU CHECKED OFF ANY PROBLEMS ON THIS QUESTIONNAIRE, HOW DIFFICULT HAVE THESE PROBLEMS MADE IT FOR YOU TO DO YOUR WORK, TAKE CARE OF THINGS AT HOME, OR GET ALONG WITH OTHER PEOPLE: SOMEWHAT DIFFICULT
6. BECOMING EASILY ANNOYED OR IRRITABLE: SEVERAL DAYS
7. FEELING AFRAID AS IF SOMETHING AWFUL MIGHT HAPPEN: SEVERAL DAYS
4. TROUBLE RELAXING: SEVERAL DAYS
GAD7 TOTAL SCORE: 5
GAD7 TOTAL SCORE: 5
5. BEING SO RESTLESS THAT IT IS HARD TO SIT STILL: NOT AT ALL
3. WORRYING TOO MUCH ABOUT DIFFERENT THINGS: SEVERAL DAYS
8. IF YOU CHECKED OFF ANY PROBLEMS, HOW DIFFICULT HAVE THESE MADE IT FOR YOU TO DO YOUR WORK, TAKE CARE OF THINGS AT HOME, OR GET ALONG WITH OTHER PEOPLE?: SOMEWHAT DIFFICULT

## 2024-10-14 ASSESSMENT — PATIENT HEALTH QUESTIONNAIRE - PHQ9
SUM OF ALL RESPONSES TO PHQ QUESTIONS 1-9: 7
10. IF YOU CHECKED OFF ANY PROBLEMS, HOW DIFFICULT HAVE THESE PROBLEMS MADE IT FOR YOU TO DO YOUR WORK, TAKE CARE OF THINGS AT HOME, OR GET ALONG WITH OTHER PEOPLE: SOMEWHAT DIFFICULT
SUM OF ALL RESPONSES TO PHQ QUESTIONS 1-9: 7

## 2024-10-14 ASSESSMENT — PAIN SCALES - GENERAL: PAINLEVEL: SEVERE PAIN (6)

## 2024-10-14 NOTE — PATIENT INSTRUCTIONS
I do have concerns for a subareolar abscess (collection of pus by the nipple/areola) give redness, firmness, and warmth. I have ordered a diagnostic mammogram and ultrasound for further evaluation. Please call [phone] to schedule your imaging study.     I have prescribed a 7 day course of 4 times per day cephalexin (Keflex) to treat the suspected bacterial infection. Please take with food to prevent upset stomach. I would also recommend eating yogurt or cottage cheese while taking to replenish good gut bacteria.

## 2024-10-14 NOTE — PROGRESS NOTES
Assessment & Plan     Breast pain, left  Patient is a 44 year-old female presenting with concerns of left breast pain and erythema since Friday. On exam there is a 3 cm area of erythema, warmth, and induration to the lateral aspect of left areola. Concerns of subareolar abscess present given appearance and associated induration. Prescribed 7 day course of QID keflex due to high suspicion for bacterial process. Orders placed for diagnostic mammogram and breast US for further evaluation. Discussed proper use of medication(s) and potential side effects. Follow-up if symptoms fail to improve despite above interventions. Patient understands and is agreeable to plan as discussed in clinic.  - cephALEXin (KEFLEX) 500 MG capsule; Take 1 capsule (500 mg) by mouth 4 times daily for 7 days.  - MA Diagnostic Digital Left; Future  - US Breast Left Limited 1-3 Quadrants; Future      Subjective   Barbara is a 44 year old, presenting for the following health issues:  Derm Problem        10/14/2024     3:19 PM   Additional Questions   Roomed by Татьяна Bansal CMA   Accompanied by self         10/14/2024     3:19 PM   Patient Reported Additional Medications   Patient reports taking the following new medications none     History of Present Illness       Reason for visit:  Sore on left breast  Symptom onset:  3-7 days ago  Symptoms include:  Tenderness, redness  Symptom intensity:  Moderate  Symptom progression:  Worsening  Had these symptoms before:  No  What makes it worse:  Clothes, pressure from seatbelt  What makes it better:  Warm compress/shower   She is taking medications regularly.     Derm Concern  Onset/Duration: over the weekend   Description  Location: left breast  Character: painful, red-redness is now spreading   Itching: Yes  Intensity:  moderate, 5/10  Progression of Symptoms:  worsening  Accompanying signs and symptoms:   Fever: No   Hard, did have yellow drainage from red spot not the nipple, no discharge, no  "warmth  History:   none  Precipitating or alleviating factors: clothes and pressure make it worse, warm compress/shower make it better  Therapies tried and outcome: warm compress/shower    Presents with concerns of left breast erythema and tenderness. Noticed on Friday. Thought it was related to an insect bite, but does not recall being bit. Erythema has increased in size since onset. Pain is constant and worse with pressure or palpation of area. Today when showering noticed some purulent discharge. Not currently draining.     Denies fever, chills, or other systemic symptoms.       Objective    /70   Pulse 88   Temp 99.7  F (37.6  C) (Temporal)   Resp 12   Ht 1.595 m (5' 2.8\")   Wt 67.1 kg (148 lb)   LMP  (LMP Unknown)   SpO2 100%   Breastfeeding No   BMI 26.39 kg/m    Body mass index is 26.39 kg/m .  Physical Exam  Vitals reviewed.   Constitutional:       General: She is not in acute distress.     Appearance: Normal appearance. She is not ill-appearing.   HENT:      Head: Normocephalic and atraumatic.   Eyes:      Conjunctiva/sclera: Conjunctivae normal.      Right eye: Right conjunctiva is not injected. No exudate.     Left eye: Left conjunctiva is not injected. No exudate.     Pupils: Pupils are equal, round, and reactive to light.   Pulmonary:      Effort: Pulmonary effort is normal. No respiratory distress.      Breath sounds: Normal breath sounds.   Chest:   Breasts:     Right: Normal.      Left: Skin change and tenderness present.       Lymphadenopathy:      Cervical: No cervical adenopathy.   Skin:     General: Skin is warm and dry.      Capillary Refill: Capillary refill takes less than 2 seconds.      Findings: No lesion or rash.   Neurological:      Mental Status: She is alert.   Psychiatric:         Attention and Perception: Attention and perception normal.         Mood and Affect: Mood and affect normal.        US and mammogram pending.         Signed Electronically by: Miley Galeano, " APRN CNP

## 2024-10-14 NOTE — TELEPHONE ENCOUNTER
Small gael refill given.     Needs physical, please call to schedule.  Thank you,   Nicole Soto DNP

## 2024-10-16 ENCOUNTER — ANCILLARY PROCEDURE (OUTPATIENT)
Dept: ULTRASOUND IMAGING | Facility: CLINIC | Age: 44
End: 2024-10-16
Payer: COMMERCIAL

## 2024-10-16 ENCOUNTER — ANCILLARY PROCEDURE (OUTPATIENT)
Dept: MAMMOGRAPHY | Facility: CLINIC | Age: 44
End: 2024-10-16
Payer: COMMERCIAL

## 2024-10-16 DIAGNOSIS — N64.4 BREAST PAIN, LEFT: ICD-10-CM

## 2024-10-16 PROCEDURE — G0279 TOMOSYNTHESIS, MAMMO: HCPCS

## 2024-10-16 PROCEDURE — 76642 ULTRASOUND BREAST LIMITED: CPT | Mod: LT

## 2024-10-16 PROCEDURE — 77066 DX MAMMO INCL CAD BI: CPT

## 2024-10-24 ENCOUNTER — OFFICE VISIT (OUTPATIENT)
Dept: FAMILY MEDICINE | Facility: CLINIC | Age: 44
End: 2024-10-24
Attending: NURSE PRACTITIONER
Payer: COMMERCIAL

## 2024-10-24 VITALS
OXYGEN SATURATION: 98 % | WEIGHT: 146 LBS | HEART RATE: 76 BPM | TEMPERATURE: 96.9 F | BODY MASS INDEX: 25.87 KG/M2 | SYSTOLIC BLOOD PRESSURE: 114 MMHG | DIASTOLIC BLOOD PRESSURE: 70 MMHG | RESPIRATION RATE: 12 BRPM | HEIGHT: 63 IN

## 2024-10-24 DIAGNOSIS — J30.1 SEASONAL ALLERGIC RHINITIS DUE TO POLLEN: ICD-10-CM

## 2024-10-24 DIAGNOSIS — G47.9 DISTURBANCE IN SLEEP BEHAVIOR: ICD-10-CM

## 2024-10-24 DIAGNOSIS — E61.1 IRON DEFICIENCY: ICD-10-CM

## 2024-10-24 DIAGNOSIS — F33.0 MILD EPISODE OF RECURRENT MAJOR DEPRESSIVE DISORDER (H): ICD-10-CM

## 2024-10-24 DIAGNOSIS — Z12.4 CERVICAL CANCER SCREENING: ICD-10-CM

## 2024-10-24 DIAGNOSIS — R79.89 ELEVATED PROLACTIN LEVEL: ICD-10-CM

## 2024-10-24 DIAGNOSIS — Z00.00 ROUTINE GENERAL MEDICAL EXAMINATION AT A HEALTH CARE FACILITY: Primary | ICD-10-CM

## 2024-10-24 DIAGNOSIS — F41.1 GENERALIZED ANXIETY DISORDER: ICD-10-CM

## 2024-10-24 DIAGNOSIS — J45.30 MILD PERSISTENT ASTHMA WITHOUT COMPLICATION: ICD-10-CM

## 2024-10-24 LAB
CHOLEST SERPL-MCNC: 177 MG/DL
ERYTHROCYTE [DISTWIDTH] IN BLOOD BY AUTOMATED COUNT: 13.9 % (ref 10–15)
EST. AVERAGE GLUCOSE BLD GHB EST-MCNC: 108 MG/DL
FASTING STATUS PATIENT QL REPORTED: YES
FERRITIN SERPL-MCNC: 44 NG/ML (ref 6–175)
FERRITIN SERPL-MCNC: 52 NG/ML (ref 6–175)
HBA1C MFR BLD: 5.4 % (ref 0–5.6)
HCT VFR BLD AUTO: 37.4 % (ref 35–47)
HDLC SERPL-MCNC: 54 MG/DL
HGB BLD-MCNC: 12.4 G/DL (ref 11.7–15.7)
HOLD SPECIMEN: NORMAL
HPV HR 12 DNA CVX QL NAA+PROBE: NEGATIVE
HPV16 DNA CVX QL NAA+PROBE: NEGATIVE
HPV18 DNA CVX QL NAA+PROBE: NEGATIVE
HUMAN PAPILLOMA VIRUS FINAL DIAGNOSIS: NORMAL
IRON BINDING CAPACITY (ROCHE): 295 UG/DL (ref 240–430)
IRON BINDING CAPACITY (ROCHE): 304 UG/DL (ref 240–430)
IRON SATN MFR SERPL: 42 % (ref 15–46)
IRON SATN MFR SERPL: 43 % (ref 15–46)
IRON SERPL-MCNC: 128 UG/DL (ref 37–145)
IRON SERPL-MCNC: 129 UG/DL (ref 37–145)
LDLC SERPL CALC-MCNC: 107 MG/DL
MCH RBC QN AUTO: 29.6 PG (ref 26.5–33)
MCHC RBC AUTO-ENTMCNC: 33.2 G/DL (ref 31.5–36.5)
MCV RBC AUTO: 89 FL (ref 78–100)
NONHDLC SERPL-MCNC: 123 MG/DL
PLATELET # BLD AUTO: 288 10E3/UL (ref 150–450)
RBC # BLD AUTO: 4.19 10E6/UL (ref 3.8–5.2)
TRIGL SERPL-MCNC: 80 MG/DL
TSH SERPL DL<=0.005 MIU/L-ACNC: 1.83 UIU/ML (ref 0.3–4.2)
WBC # BLD AUTO: 6.5 10E3/UL (ref 4–11)

## 2024-10-24 PROCEDURE — 87624 HPV HI-RISK TYP POOLED RSLT: CPT | Performed by: NURSE PRACTITIONER

## 2024-10-24 PROCEDURE — 84443 ASSAY THYROID STIM HORMONE: CPT | Performed by: NURSE PRACTITIONER

## 2024-10-24 PROCEDURE — 83036 HEMOGLOBIN GLYCOSYLATED A1C: CPT | Performed by: NURSE PRACTITIONER

## 2024-10-24 PROCEDURE — 80061 LIPID PANEL: CPT | Performed by: NURSE PRACTITIONER

## 2024-10-24 PROCEDURE — G0145 SCR C/V CYTO,THINLAYER,RESCR: HCPCS | Performed by: NURSE PRACTITIONER

## 2024-10-24 PROCEDURE — 84146 ASSAY OF PROLACTIN: CPT | Performed by: NURSE PRACTITIONER

## 2024-10-24 PROCEDURE — 99459 PELVIC EXAMINATION: CPT | Performed by: NURSE PRACTITIONER

## 2024-10-24 PROCEDURE — 90656 IIV3 VACC NO PRSV 0.5 ML IM: CPT | Performed by: NURSE PRACTITIONER

## 2024-10-24 PROCEDURE — 82728 ASSAY OF FERRITIN: CPT | Performed by: NURSE PRACTITIONER

## 2024-10-24 PROCEDURE — 99396 PREV VISIT EST AGE 40-64: CPT | Mod: 25 | Performed by: NURSE PRACTITIONER

## 2024-10-24 PROCEDURE — 82670 ASSAY OF TOTAL ESTRADIOL: CPT | Performed by: NURSE PRACTITIONER

## 2024-10-24 PROCEDURE — 83540 ASSAY OF IRON: CPT | Performed by: NURSE PRACTITIONER

## 2024-10-24 PROCEDURE — 90471 IMMUNIZATION ADMIN: CPT | Performed by: NURSE PRACTITIONER

## 2024-10-24 PROCEDURE — 84443 ASSAY THYROID STIM HORMONE: CPT | Mod: 59 | Performed by: NURSE PRACTITIONER

## 2024-10-24 PROCEDURE — 36415 COLL VENOUS BLD VENIPUNCTURE: CPT | Performed by: NURSE PRACTITIONER

## 2024-10-24 PROCEDURE — 83550 IRON BINDING TEST: CPT | Performed by: NURSE PRACTITIONER

## 2024-10-24 PROCEDURE — 99214 OFFICE O/P EST MOD 30 MIN: CPT | Mod: 25 | Performed by: NURSE PRACTITIONER

## 2024-10-24 PROCEDURE — 85027 COMPLETE CBC AUTOMATED: CPT | Performed by: NURSE PRACTITIONER

## 2024-10-24 PROCEDURE — 84439 ASSAY OF FREE THYROXINE: CPT | Performed by: NURSE PRACTITIONER

## 2024-10-24 RX ORDER — MONTELUKAST SODIUM 10 MG/1
1 TABLET ORAL AT BEDTIME
Qty: 90 TABLET | Refills: 2 | Status: SHIPPED | OUTPATIENT
Start: 2024-10-24

## 2024-10-24 RX ORDER — VENLAFAXINE HYDROCHLORIDE 150 MG/1
300 CAPSULE, EXTENDED RELEASE ORAL DAILY
Qty: 180 CAPSULE | Refills: 1 | Status: SHIPPED | OUTPATIENT
Start: 2024-10-24

## 2024-10-24 RX ORDER — BUSPIRONE HYDROCHLORIDE 15 MG/1
15 TABLET ORAL 2 TIMES DAILY
Qty: 180 TABLET | Refills: 1 | Status: SHIPPED | OUTPATIENT
Start: 2024-10-24

## 2024-10-24 RX ORDER — TRAZODONE HYDROCHLORIDE 50 MG/1
TABLET, FILM COATED ORAL
Qty: 90 TABLET | Refills: 3 | Status: SHIPPED | OUTPATIENT
Start: 2024-10-24

## 2024-10-24 RX ORDER — MOMETASONE FUROATE AND FORMOTEROL FUMARATE DIHYDRATE 200; 5 UG/1; UG/1
AEROSOL RESPIRATORY (INHALATION)
Qty: 13 G | Refills: 0 | Status: SHIPPED | OUTPATIENT
Start: 2024-10-24

## 2024-10-24 RX ORDER — ALBUTEROL SULFATE 90 UG/1
2 INHALANT RESPIRATORY (INHALATION) EVERY 6 HOURS PRN
Qty: 18 G | Refills: 1 | Status: SHIPPED | OUTPATIENT
Start: 2024-10-24

## 2024-10-24 SDOH — HEALTH STABILITY: PHYSICAL HEALTH: ON AVERAGE, HOW MANY DAYS PER WEEK DO YOU ENGAGE IN MODERATE TO STRENUOUS EXERCISE (LIKE A BRISK WALK)?: 2 DAYS

## 2024-10-24 ASSESSMENT — ASTHMA QUESTIONNAIRES
ACT_TOTALSCORE: 22
ACT_TOTALSCORE: 22
QUESTION_4 LAST FOUR WEEKS HOW OFTEN HAVE YOU USED YOUR RESCUE INHALER OR NEBULIZER MEDICATION (SUCH AS ALBUTEROL): ONCE A WEEK OR LESS
QUESTION_2 LAST FOUR WEEKS HOW OFTEN HAVE YOU HAD SHORTNESS OF BREATH: ONCE OR TWICE A WEEK
QUESTION_3 LAST FOUR WEEKS HOW OFTEN DID YOUR ASTHMA SYMPTOMS (WHEEZING, COUGHING, SHORTNESS OF BREATH, CHEST TIGHTNESS OR PAIN) WAKE YOU UP AT NIGHT OR EARLIER THAN USUAL IN THE MORNING: NOT AT ALL
QUESTION_1 LAST FOUR WEEKS HOW MUCH OF THE TIME DID YOUR ASTHMA KEEP YOU FROM GETTING AS MUCH DONE AT WORK, SCHOOL OR AT HOME: NONE OF THE TIME
QUESTION_5 LAST FOUR WEEKS HOW WOULD YOU RATE YOUR ASTHMA CONTROL: WELL CONTROLLED

## 2024-10-24 ASSESSMENT — SOCIAL DETERMINANTS OF HEALTH (SDOH): HOW OFTEN DO YOU GET TOGETHER WITH FRIENDS OR RELATIVES?: TWICE A WEEK

## 2024-10-24 ASSESSMENT — PAIN SCALES - GENERAL: PAINLEVEL_OUTOF10: NO PAIN (0)

## 2024-10-24 NOTE — PROGRESS NOTES
Preventive Care Visit  [facility] MANJULA Crow CNP, Family Medicine  Oct 24, 2024      Assessment & Plan     Routine general medical examination at a health care facility  Immunization recommendations reviewed and ordered per discussion and patient agreement.    Discussed medication coverage for vaccines, and if appropriate to have done at pharmacy.    Discussed options and rationale.  Ordered HCM testing per our discussion and patient decision based on USPSTF and Cancer screening guidelines.      Reinforced healthy habits with lifestyle, exercise and nutrition.    - CBC with Platelets and Reflex to Iron Studies; Future  - Lipid panel reflex to direct LDL Fasting; Future  - Hemoglobin A1c; Future  - TSH with free T4 reflex; Future  - CBC with Platelets and Reflex to Iron Studies  - Lipid panel reflex to direct LDL Fasting  - Hemoglobin A1c  - TSH with free T4 reflex    Generalized anxiety disorder  Stable, continuu plan.   - OFFICE/OUTPT VISIT,EST,LEVL IV  - busPIRone (BUSPAR) 15 MG tablet; Take 1 tablet (15 mg) by mouth 2 times daily.    Mild persistent asthma without complication  -Stable, continue plan.  Medication refills provided as necessary.  Lab monitoring if needed/considered.  - OFFICE/OUTPT VISIT,EST,LEVL IV  - albuterol (PROAIR HFA/PROVENTIL HFA/VENTOLIN HFA) 108 (90 Base) MCG/ACT inhaler; Inhale 2 puffs into the lungs every 6 hours as needed for shortness of breath, wheezing or cough.  - mometasone-formoterol (DULERA) 200-5 MCG/ACT inhaler; USE 2 INHALATIONS TWICE A DAY (NEED TO BE SEEN FOR FURTHER REFILLS)    Elevated prolactin level  -Noted, contributing to patient's health and decision making. Followed by specialist, continue plan.     Iron deficiency  Monitoring.   - OFFICE/OUTPT VISIT,EST,LEVL IV  - Ferritin; Future  - Iron & Iron Binding Capacity; Future  - Ferritin  - Iron & Iron Binding Capacity    Seasonal allergic rhinitis due to pollen  -Stable, continue plan.   "Medication refills provided as necessary.  Lab monitoring if needed/considered.  - montelukast (SINGULAIR) 10 MG tablet; Take 1 tablet (10 mg) by mouth at bedtime.    Disturbance in sleep behavior  -Stable, continue plan.  Medication refills provided as necessary.  Lab monitoring if needed/considered.  - traZODone (DESYREL) 50 MG tablet; TAKE 1 TABLET AT BEDTIME AS NEEDED  Strength: 50 mg    Mild episode of recurrent major depressive disorder (H)  Mood symptoms addressed. Symptoms controlled/stable.  Continue plan.  Healthy self cares.  Will refill as necessary in between visits.  Routine follow-up is every 6 months.  Counseling, as needed.  Patient advised to follow-up if worsening symptoms.  - venlafaxine (EFFEXOR XR) 150 MG 24 hr capsule; Take 2 capsules (300 mg) by mouth daily.    Cervical cancer screening    - HPV and Gynecologic Cytology Panel - Recommended Age 30 - 65 Years    Patient has been advised of split billing requirements and indicates understanding: Yes        BMI  Estimated body mass index is 25.87 kg/m  as calculated from the following:    Height as of this encounter: 1.6 m (5' 2.99\").    Weight as of this encounter: 66.2 kg (146 lb).   Weight management plan: Discussed healthy diet and exercise guidelines    Counseling  Appropriate preventive services were addressed with this patient via screening, questionnaire, or discussion as appropriate for fall prevention, nutrition, physical activity, Tobacco-use cessation, social engagement, weight loss and cognition.  Checklist reviewing preventive services available has been given to the patient.  Reviewed patient's diet, addressing concerns and/or questions.   She is at risk for lack of exercise and has been provided with information to increase physical activity for the benefit of her well-being.   She is at risk for psychosocial distress and has been provided with information to reduce risk.   The patient's PHQ-9 score is consistent with mild " depression. She was provided with information regarding depression.       MEDICATIONS:        - Continue other medications without change    Subjective   Barbara is a 44 year old, presenting for the following:    - A1c was high a few months ago, wants to follow up on that     HX of iron deficiency- getting ferritin levels.     Physical        10/24/2024     7:46 AM   Additional Questions   Roomed by AG   Accompanied by self          HPI  Followed by rheumatology, intermittent joint pain, + fatigue.   Mood is good.     Due for medication refills. Does not sleep without Trazodone. Ran out of Effexor- taking 300 mg currently, and would like to stay on this.         8/8/2023     9:15 AM 10/14/2024     3:12 PM 10/24/2024     7:38 AM   PHQ   PHQ-9 Total Score 0 7 7    Q9: Thoughts of better off dead/self-harm past 2 weeks Not at all  Not at all  Not at all        Patient-reported          In long-term relationship- this is going well.     Elevated prolactin level- is seeing endo. Occ. Hot flashing. Using magnesium, prn with good results.     Had IUD replaced- on Kyleena- doing well with this- last IUD was placed in the lower segment.           Health Care Directive  Patient does not have a Health Care Directive: Discussed advance care planning with patient; however, patient declined at this time.      10/24/2024   General Health   How would you rate your overall physical health? (!) FAIR   Feel stress (tense, anxious, or unable to sleep) Only a little      (!) STRESS CONCERN      10/24/2024   Nutrition   Three or more servings of calcium each day? (!) I DON'T KNOW   Diet: Regular (no restrictions)   How many servings of fruit and vegetables per day? (!) 2-3   How many sweetened beverages each day? (!) 2            10/24/2024   Exercise   Days per week of moderate/strenous exercise 2 days      (!) EXERCISE CONCERN      10/24/2024   Social Factors   Frequency of gathering with friends or relatives Twice a week   Worry food  won't last until get money to buy more No   Food not last or not have enough money for food? No   Do you have housing? (Housing is defined as stable permanent housing and does not include staying ouside in a car, in a tent, in an abandoned building, in an overnight shelter, or couch-surfing.) Yes   Are you worried about losing your housing? No   Lack of transportation? No   Unable to get utilities (heat,electricity)? No            10/24/2024   Dental   Dentist two times every year? Yes            10/24/2024   TB Screening   Were you born outside of the [place]? No          Today's PHQ-9 Score:       10/24/2024     7:38 AM   PHQ-9 SCORE   PHQ-9 Total Score MyChart 7 (Mild depression)   PHQ-9 Total Score 7        Patient-reported         10/24/2024   Substance Use   Alcohol more than 3/day or more than 7/wk No   Do you use any other substances recreationally? No        Social History     Tobacco Use    Smoking status: Former     Current packs/day: 0.00     Types: Cigarettes     Passive exposure: Past    Smokeless tobacco: Never    Tobacco comments:     quit date 8/2010    Vaping Use    Vaping status: Never Used   Substance Use Topics    Alcohol use: Yes     Comment: 0-2 per week     Drug use: No           10/16/2024   LAST FHS-7 RESULTS   1st degree relative breast or ovarian cancer No   Any relative bilateral breast cancer No   Any male have breast cancer No   Any ONE woman have BOTH breast AND ovarian cancer No   Any woman with breast cancer before 50yrs No   2 or more relatives with breast AND/OR ovarian cancer No   2 or more relatives with breast AND/OR bowel cancer No           Mammogram Screening - Mammogram every 1-2 years updated in Health Maintenance based on mutual decision making        10/24/2024   STI Screening   New sexual partner(s) since last STI/HIV test? No        History of abnormal Pap smear: YES - other categories - see link Cervical Cytology Screening Guidelines    Negative COlp in 2021        Latest  "Ref Rng & Units 10/16/2023     1:47 PM 10/13/2022    11:40 AM 9/20/2021    12:26 PM   PAP / HPV   PAP  Negative for Intraepithelial Lesion or Malignancy (NILM)  Negative for Intraepithelial Lesion or Malignancy (NILM)  Low-grade squamous intraepithelial lesion (LSIL) encompassing HPV/mild dysplasia/CIN1    HPV 16 DNA Negative Negative  Negative  Negative    HPV 18 DNA Negative Negative  Negative  Negative    Other HR HPV Negative Negative  Positive  Positive      ASCVD Risk   The 10-year ASCVD risk score (Michael CUNNINGHAM, et al., 2019) is: 0.5%    Values used to calculate the score:      Age: 44 years      Sex: Female      Is Non- : No      Diabetic: No      Tobacco smoker: No      Systolic Blood Pressure: 114 mmHg      Is BP treated: No      HDL Cholesterol: 43 mg/dL      Total Cholesterol: 143 mg/dL        10/24/2024   Contraception/Family Planning   Questions about contraception or family planning No           Reviewed and updated as needed this visit by Provider                          Review of Systems  Constitutional, HEENT, cardiovascular, pulmonary, GI, , musculoskeletal, neuro, skin, endocrine and psych systems are negative, except as otherwise noted.     Objective    Exam  /70   Pulse 76   Temp 96.9  F (36.1  C) (Temporal)   Resp 12   Ht 1.6 m (5' 2.99\")   Wt 66.2 kg (146 lb)   LMP 10/20/2024 (Approximate)   SpO2 98%   BMI 25.87 kg/m     Estimated body mass index is 25.87 kg/m  as calculated from the following:    Height as of this encounter: 1.6 m (5' 2.99\").    Weight as of this encounter: 66.2 kg (146 lb).    Physical Exam  GENERAL: alert and no distress  EYES: Eyes grossly normal to inspection, PERRL and conjunctivae and sclerae normal  HENT: ear canals and TM's normal, nose and mouth without ulcers or lesions  NECK: no adenopathy, no asymmetry, masses, or scars  RESP: lungs clear to auscultation - no rales, rhonchi or wheezes  BREAST: normal without masses, " tenderness or nipple discharge and no palpable axillary masses or adenopathy, minor skin cyst- resolved, at edge of left areola at 1 o'clock position.   CV: regular rate and rhythm, normal S1 S2, no S3 or S4, no murmur, click or rub, no peripheral edema  ABDOMEN: soft, nontender, no hepatosplenomegaly, no masses and bowel sounds normal   (female): normal female external genitalia, normal urethral meatus, normal vaginal mucosa, IUD strings seen at 2 cm from OS  MS: no gross musculoskeletal defects noted, no edema  SKIN: no suspicious lesions or rashes  NEURO: Normal strength and tone, mentation intact and speech normal  PSYCH: mentation appears normal, affect normal/bright        Signed Electronically by: MANJULA Guerrero CNP    Answers submitted by the patient for this visit:  Patient Health Questionnaire (Submitted on 10/24/2024)  If you checked off any problems, how difficult have these problems made it for you to do your work, take care of things at home, or get along with other people?: Somewhat difficult  PHQ9 TOTAL SCORE: 7

## 2024-10-24 NOTE — PATIENT INSTRUCTIONS
Patient Education   Preventive Care Advice   This is general advice given by our system to help you stay healthy. However, your care team may have specific advice just for you. Please talk to your care team about your preventive care needs.  Nutrition  Eat 5 or more servings of fruits and vegetables each day.  Try wheat bread, brown rice and whole grain pasta (instead of white bread, rice, and pasta).  Get enough calcium and vitamin D. Check the label on foods and aim for 100% of the RDA (recommended daily allowance).  Lifestyle  Exercise at least 150 minutes each week  (30 minutes a day, 5 days a week).  Do muscle strengthening activities 2 days a week. These help control your weight and prevent disease.  No smoking.  Wear sunscreen to prevent skin cancer.  Have a dental exam and cleaning every 6 months.  Yearly exams  See your health care team every year to talk about:  Any changes in your health.  Any medicines your care team has prescribed.  Preventive care, family planning, and ways to prevent chronic diseases.  Shots (vaccines)   HPV shots (up to age 26), if you've never had them before.  Hepatitis B shots (up to age 59), if you've never had them before.  COVID-19 shot: Get this shot when it's due.  Flu shot: Get a flu shot every year.  Tetanus shot: Get a tetanus shot every 10 years.  Pneumococcal, hepatitis A, and RSV shots: Ask your care team if you need these based on your risk.  Shingles shot (for age 50 and up)  General health tests  Diabetes screening:  Starting at age 35, Get screened for diabetes at least every 3 years.  If you are younger than age 35, ask your care team if you should be screened for diabetes.  Cholesterol test: At age 39, start having a cholesterol test every 5 years, or more often if advised.  Bone density scan (DEXA): At age 50, ask your care team if you should have this scan for osteoporosis (brittle bones).  Hepatitis C: Get tested at least once in your life.  STIs (sexually  transmitted infections)  Before age 24: Ask your care team if you should be screened for STIs.  After age 24: Get screened for STIs if you're at risk. You are at risk for STIs (including HIV) if:  You are sexually active with more than one person.  You don't use condoms every time.  You or a partner was diagnosed with a sexually transmitted infection.  If you are at risk for HIV, ask about PrEP medicine to prevent HIV.  Get tested for HIV at least once in your life, whether you are at risk for HIV or not.  Cancer screening tests  Cervical cancer screening: If you have a cervix, begin getting regular cervical cancer screening tests starting at age 21.  Breast cancer scan (mammogram): If you've ever had breasts, begin having regular mammograms starting at age 40. This is a scan to check for breast cancer.  Colon cancer screening: It is important to start screening for colon cancer at age 45.  Have a colonoscopy test every 10 years (or more often if you're at risk) Or, ask your provider about stool tests like a FIT test every year or Cologuard test every 3 years.  To learn more about your testing options, visit:   .  For help making a decision, visit:   https://bit.ly/rw00428.  Prostate cancer screening test: If you have a prostate, ask your care team if a prostate cancer screening test (PSA) at age 55 is right for you.  Lung cancer screening: If you are a current or former smoker ages 50 to 80, ask your care team if ongoing lung cancer screenings are right for you.  For informational purposes only. Not to replace the advice of your health care provider. Copyright   2023 [facility]. All rights reserved. Clinically reviewed by the [facility] Transitions Program. [employer] 047020 - REV 01/24.  Learning About Depression Screening  What is depression screening?  Depression screening is a way to see if you have depression symptoms. It may be done by a doctor or counselor. It's often part of a routine  "checkup. That's because your mental health is just as important as your physical health.  Depression is a mental health condition that affects how you feel, think, and act. You may:  Have less energy.  Lose interest in your daily activities.  Feel sad and grouchy for a long time.  Depression is very common. It affects people of all ages.  Many things can lead to depression. Some people become depressed after they have a stroke or find out they have a major illness like cancer or heart disease. The death of a loved one or a breakup may lead to depression. It can run in families. Most experts believe that a combination of inherited genes and stressful life events can cause it.  What happens during screening?  You may be asked to fill out a form about your depression symptoms. You and the doctor will discuss your answers. The doctor may ask you more questions to learn more about how you think, act, and feel.  What happens after screening?  If you have symptoms of depression, your doctor will talk to you about your options.  Doctors usually treat depression with medicines or counseling. Often, combining the two works best. Many people don't get help because they think that they'll get over the depression on their own. But people with depression may not get better unless they get treatment.  The cause of depression is not well understood. There may be many factors involved. But if you have depression, it's not your fault.  A serious symptom of depression is thinking about death or suicide. If you or someone you care about talks about this or about feeling hopeless, get help right away.  It's important to know that depression can be treated. Medicine, counseling, and self-care may help.  Where can you learn more?  Go to https://www.[employer].net/patiented  Enter T185 in the search box to learn more about \"Learning About Depression Screening.\"  Current as of: June 24, 2023  Content Version: 14.2 2024 [facility].   Care instructions adapted under license by your healthcare professional. If you have questions about a medical condition or this instruction, always ask your healthcare professional. [employer] disclaims any warranty or liability for your use of this information.

## 2024-10-24 NOTE — LETTER
My Asthma Action Plan    Name: Barbara Yates   YOB: 1980  Date: 10/24/2024   My doctor: MANJULA Guerrero CNP   My clinic: [facility]        My Control Medicine: Montelukast (Singulair) -  10 mg daily  My Rescue Medicine: Albuterol (Proair/Ventolin/Proventil HFA) 2-4 puffs EVERY 4 HOURS as needed. Use a spacer if recommended by your provider.   My Asthma Severity:   Mild Persistent  Know your asthma triggers: upper respiratory infections, dust mites, pollens, and cold air               GREEN ZONE   Good Control  I feel good  No cough or wheeze  Can work, sleep and play without asthma symptoms       Take your asthma control medicine every day.     If exercise triggers your asthma, take your rescue medication  15 minutes before exercise or sports, and  During exercise if you have asthma symptoms  Spacer to use with inhaler: If you have a spacer, make sure to use it with your inhaler             YELLOW ZONE Getting Worse  I have ANY of these:  I do not feel good  Cough or wheeze  Chest feels tight  Wake up at night   Keep taking your Green Zone medications  Start taking your rescue medicine:  every 20 minutes for up to 1 hour. Then every 4 hours for 24-48 hours.  If you stay in the Yellow Zone for more than 12-24 hours, contact your doctor.  If you do not return to the Green Zone in 12-24 hours or you get worse, start taking your oral steroid medicine if prescribed by your provider.           RED ZONE Medical Alert - Get Help  I have ANY of these:  I feel awful  Medicine is not helping  Breathing getting harder  Trouble walking or talking  Nose opens wide to breathe       Take your rescue medicine NOW  If your provider has prescribed an oral steroid medicine, start taking it NOW  Call your doctor NOW  If you are still in the Red Zone after 20 minutes and you have not reached your doctor:  Take your rescue medicine again and  Call 911 or go to the emergency room right  away    See your regular doctor within 2 weeks of an Emergency Room or Urgent Care visit for follow-up treatment.          Annual Reminders:  Meet with Asthma Educator,  Flu Shot in the Fall, consider Pneumonia Vaccination for patients with asthma (aged 19 and older).    Pharmacy:    [facility] EM[facility]OY[facility] ONLY [facility]  [id number] - [place], [place] - [address] [facility]  [facility] - [facility], [place] - [address] [place] [address]    Electronically signed by MANJULA Guerrero CNP   Date: 10/24/24                      Asthma Triggers  How To Control Things That Make Your Asthma Worse    Triggers are things that make your asthma worse.  Look at the list below to help you find your triggers and what you can do about them.  You can help prevent asthma flare-ups by staying away from your triggers.      Trigger                                                          What you can do   Cigarette Smoke  Tobacco smoke can make asthma worse. Do not allow smoking in your home, car or around you.  Be sure no one smokes at a child s day care or school.  If you smoke, ask your health care provider for ways to help you quit.  Ask family members to quit too.  Ask your health care provider for a referral to Quit Plan to help you quit smoking, or call 1-688-730-PLAN.     Colds, Flu, Bronchitis  These are common triggers of asthma. Wash your hands often.  Don t touch your eyes, nose or mouth.  Get a flu shot every year.     Dust Mites  These are tiny bugs that live in cloth or carpet. They are too small to see. Wash sheets and blankets in hot water every week.   Encase pillows and mattress in dust mite proof covers.  Avoid having carpet if you can. If you have carpet, vacuum weekly.   Use a dust mask and HEPA vacuum.   Pollen and Outdoor Mold  Some people are allergic to trees, grass, or weed pollen, or molds. Try to keep your windows closed.  Limit time out doors when  pollen count is high.   Ask you health care provider about taking medicine during allergy season.     Animal Dander  Some people are allergic to skin flakes, urine or saliva from pets with fur or feathers. Keep pets with fur or feathers out of your home.    If you can t keep the pet outdoors, then keep the pet out of your bedroom.  Keep the bedroom door closed.  Keep pets off cloth furniture and away from stuffed toys.     Mice, Rats, and Cockroaches   Some people are allergic to the waste from these pests.   Cover food and garbage.  Clean up spills and food crumbs.  Store grease in the refrigerator.   Keep food out of the bedroom.   Indoor Mold  This can be a trigger if your home has high moisture. Fix leaking faucets, pipes, or other sources of water.   Clean moldy surfaces.  Dehumidify basement if it is damp and smelly.   Smoke, Strong Odors, and Sprays  These can reduce air quality. Stay away from strong odors and sprays, such as perfume, powder, hair spray, paints, smoke incense, paint, cleaning products, candles and new carpet.   Exercise or Sports  Some people with asthma have this trigger. Be active!  Ask your doctor about taking medicine before sports or exercise to prevent symptoms.    Warm up for 5-10 minutes before and after sports or exercise.     Other Triggers of Asthma  Cold air:  Cover your nose and mouth with a scarf.  Sometimes laughing or crying can be a trigger.  Some medicines and food can trigger asthma.

## 2024-10-25 ENCOUNTER — OFFICE VISIT (OUTPATIENT)
Dept: ENDOCRINOLOGY | Facility: CLINIC | Age: 44
End: 2024-10-25
Attending: NURSE PRACTITIONER
Payer: COMMERCIAL

## 2024-10-25 VITALS
HEART RATE: 84 BPM | WEIGHT: 147 LBS | DIASTOLIC BLOOD PRESSURE: 78 MMHG | SYSTOLIC BLOOD PRESSURE: 115 MMHG | BODY MASS INDEX: 26.05 KG/M2 | OXYGEN SATURATION: 99 %

## 2024-10-25 DIAGNOSIS — E22.1 HYPERPROLACTINEMIA (H): ICD-10-CM

## 2024-10-25 LAB
T4 FREE SERPL-MCNC: 0.91 NG/DL (ref 0.9–1.7)
TSH SERPL DL<=0.005 MIU/L-ACNC: 1.84 UIU/ML (ref 0.3–4.2)

## 2024-10-25 PROCEDURE — 99204 OFFICE O/P NEW MOD 45 MIN: CPT | Performed by: INTERNAL MEDICINE

## 2024-10-25 NOTE — NURSING NOTE
Barbara Yates's goals for this visit include:   Chief Complaint   Patient presents with    New Patient     Elevated prolactin level      She requests these members of her care team be copied on today's visit information: Yes     PCP: Nicole Soto    Referring Provider:  MANJULA Payne CNP  [address] [place] [address]    /78 (BP Location: Left arm, Patient Position: Sitting, Cuff Size: Adult Large)   Pulse 84   Wt 66.7 kg (147 lb)   LMP 10/20/2024 (Approximate)   SpO2 99%   BMI 26.05 kg/m      Do you need any medication refills at today's visit? Unsure       Lillie Edward, HARSHAL  Adult Endocrinology   [facility]

## 2024-10-25 NOTE — PATIENT INSTRUCTIONS
Welcome to the [facility] Endocrinology and Diabetes Clinics     Our Endocrinology Clinics are here to provide you with a team-based, collaborative approach in the diagnosis and treatment of patients with diabetes and endocrine disorders. The team is made up of Physicians, Physician Assistants, Certified Diabetes Educators, Registered Nurses, Medical Assistants, Emergency Medical Technicians, and many others, all of whom have the unified goal of providing our patients with high quality care.     Please see below for some helpful tips to best navigate and use the [facility] Endocrinology clinic:     [place] Respect: At [facility], we are committed to a respectful and safe space for all patients, visitors, and staff.  We believe that mutual respect between patients and their care team is the foundation of quality care.  It is our expectation that you will be treated with respect by your care team.  In turn, we ask that all communication with the care team (written and verbal) be respectful and free from profanity, threatening, or abusive language.  Disrespectful communication undermines our therapeutic relationship with you and may result in us being unable to continue to provide your care.    Refills: A provider must see you at least annually to prescribe and refill medications. This is to ensure your safety as well as meet insurance and compliance regulations.    Scheduling: Many of our Providers offer both in-person or video visits. Please call to schedule any needed follow ups as soon as possible because our provider schedules fill up very quickly. Our care team has the right to require an in-person visit when they believe that it is medically necessary. Please remember that for any virtual visits, you must be in the [facility] at the time of the visit, otherwise we are unable to see you and you will need to be rescheduled.    Missed Appointments: If you need to cancel or miss your  scheduled appointment, please call the clinic at [phone] to reschedule.  Please note if you repeatedly miss appointments or repeatedly miss appointments without calling to inform us ahead of time (no-show), the clinic may elect to not allow you to reschedule without speaking to a manager, may require a Partnership In Care Agreement prior to rescheduling, or could result in you no longer being able to receive care from the clinic. Providing the clinic with timely notification if you have to miss an appointment, allows us to better serve the needs of all of our patients.    Primary Care Provider: Our Endocrinologists are Specialists in their field. We expect you to have a Primary Care Provider established to handle any needs outside of your diabetes and endocrine care.  We would be happy to assist you find a Primary Care Provider, if you do not have one.    [email or web address]: [email or web address] is a wonderful resource that allows you access to your Care Team via online or the abel. Please ask a member of the team if you would like help creating an account. Please note that it may take up to 2 business days for a response. [email or web address] messages are not reviewed on weekends or after business hours.  Emergent or urgent care needs should never be communicated via [email or web address].  If you experience a medical emergency call 911 or go to the nearest emergency room.    Labs: It is recommended that you stay within the [facility] System for labs but you are welcome to obtain ordered labs (with some exceptions) from any location of your choice as long as they are able to complete and process the needed labs. If you need us to fax orders to your preferred lab, please provide us the name and fax number of the lab you would like to go to so we can fax the orders. If your labs are drawn outside of the [facility], please have them fax the results to [phone] ([place]) or [phone] ([place]) or via [email or web address]. It is your  responsibility to ensure that outside lab results are sent to us.    We look forward to working with you. Please do not hesitate to reach out with any questions.    Thank you,    The Endocrine Team    [facility] Address:   [place] Address:     [address] [place] [address]    Phone: [phone]  Fax: [phone] [address] [place] [address]    Phone: [phone]  Fax: [phone]     [facility] Cost Estimate Phone Number: [phone]    General Lab and Imaging Scheduling Phone Number: [phone]

## 2024-10-25 NOTE — LETTER
10/25/2024      Barbara Yates  [address]  [facility] [address]      Dear Colleague,    Thank you for referring your patient, Barbara Yates, to the [facility]. Please see a copy of my visit note below.                                                                                   - Endocrinology Initial Consultation -    Reason for visit/consult:  Elevated prolactin level  [unfilled]     Primary care provider: Nicole Soto    HPI: 44 year old female is here for endocrinology clinic visit for Elevated prolactin level 40 in 10/2023   In spring 2023 she started to have excessie fatigue, not feelin well and low grade fever, found out ferritin, prolactin was elevated (PRL 40). Her WBC and Plt was low. She was hospitalized BMT floor for 54 hours.Her liver enzyme was high as well at that time. WBC was 1.7 in 8/2023.   Fall 2022, hot flush. She was on IUD. She was checked hormones at functional doctors; office. She was prescribed bio-identical progesterone. She was also non-hormone IUD placed. She was seen by OB, MRI was done read as no pituitary mass suspected lesion. Then in 2024 she was switched to progesterone IUD. She stopped progesterone oral capsule, but she still feels low libido, vaginal dryness, and not feeling great in general. She still has hot flush over 2 years but getting better.     LFTs during the hospitalization.   Lab Test 08/06/23  1341 08/06/23  0443   * 498*   * 764*   ALKPHOS 183* 192*   BILITOTAL 0.3 0.3          Prior to IUDs, she has been irregular cycle as a baseline.    History of pregnancy: Her son is currently age 13.      Latest Reference Range & Units 08/04/23 12:48   WBC 4.0 - 11.0 10e3/uL 1.7 (L)   Hemoglobin 11.7 - 15.7 g/dL 12.3   Hematocrit 35.0 - 47.0 % 37.4   Platelet Count 150 - 450 10e3/uL 130 (L)   RBC Count 3.80 - 5.20 10e6/uL 4.20   MCV 78 - 100 fL 89   MCH 26.5 - 33.0 pg 29.3   MCHC 31.5 - 36.5 g/dL 32.9   RDW 10.0 - 15.0 %  13.7   (L): Data is abnormally low    Paternal granmother and grandfather RA, paternal aunt: DM1, paternal aunt: thyroid.     Maternal grand father: lung cancer. he was a smoker.     Past Medical/Surgical History:  Past Medical History:   Diagnosis Date     Anxiety      Attention deficit disorder with hyperactivity(314.01)      Cervical high risk HPV (human papillomavirus) test positive     11/28/05, 6/14/06, 9/20/21     Depression      Depressive disorder Unknown     H/O colposcopy with cervical biopsy 06/07/2005    ECC- within normal limits. no dysplasia     H/O colposcopy with cervical biopsy 07/13/2006    biopsies and ECC- within normal limits. no dysplasia noted.     Infectious mononucleosis 01/01/2004     LSIL (low grade squamous intraepithelial lesion) on Pap smear 05/06/2005 9/20/21     Past Surgical History:   Procedure Laterality Date     ARTHROSCOPY KNEE RT/LT Left 09/08/2016     AS CLOSED TX NASAL BONE FRACTURE W/ STABILIZATION  03/01/2016     EXAM OF VAGINA,COLPOSCOPY  2006, 2005     EYE SURGERY  01/01/2008    Lasik eye surgery     ORTHOPEDIC SURGERY  9/8/16 and 12/12/16    knee/tibia       Allergies:  Allergies   Allergen Reactions     Seasonal Allergies        Current Medications   Current Outpatient Medications   Medication Sig Dispense Refill     albuterol (PROAIR HFA/PROVENTIL HFA/VENTOLIN HFA) 108 (90 Base) MCG/ACT inhaler Inhale 2 puffs into the lungs every 6 hours as needed for shortness of breath, wheezing or cough. 18 g 1     busPIRone (BUSPAR) 15 MG tablet Take 1 tablet (15 mg) by mouth 2 times daily. 180 tablet 1     cetirizine (ZYRTEC) 10 MG tablet Take 1 tablet (10 mg) by mouth daily       clindamycin (CLINDAMAX) 1 % external gel Apply topically 2 times daily as needed       levonorgestrel (KYLEENA) 19.5 MG IUD 1 each by Intrauterine route once. Inserted 5/8/24       mometasone-formoterol (DULERA) 200-5 MCG/ACT inhaler USE 2 INHALATIONS TWICE A DAY (NEED TO BE SEEN FOR FURTHER REFILLS)  13 g 0     montelukast (SINGULAIR) 10 MG tablet Take 1 tablet (10 mg) by mouth at bedtime. 90 tablet 2     ondansetron (ZOFRAN ODT) 8 MG ODT tab Take 1 tablet (8 mg) by mouth every 8 hours as needed for nausea 30 tablet 0     traZODone (DESYREL) 50 MG tablet TAKE 1 TABLET AT BEDTIME AS NEEDED  Strength: 50 mg 90 tablet 3     tretinoin (RETIN-A) 0.1 % external cream Apply topically At Bedtime       venlafaxine (EFFEXOR XR) 150 MG 24 hr capsule Take 2 capsules (300 mg) by mouth daily. 180 capsule 1     No current facility-administered medications for this visit.       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       ROS:  Full review of systems taken with the help of the intake sheet. Otherwise a complete 14 point review of systems was taken and is negative unless stated in the history above.      Physical Exam:   Vitals: LMP 10/20/2024 (Approximate)   BMI= There is no height or weight on file to calculate BMI.   General: well appearing, no acute distress, pleasant and conversant,   Mental Status/neuro: alert and oriented  Face: symmetrical, normal facial color  Eyes: anicteric, no proptosis or lid lag  Resp: normally breathing      Labs : I reviewed data from epic and extract and summarize the pertinent data here.      Latest Reference Range & Units 10/16/23 14:24     Prolactin 5 - 23 ng/mL 40 (H)   (H): Data is abnormally high  Lab Results   Component Value Date     09/08/2023     10/08/2020      Lab Results  "  Component Value Date    POTASSIUM 4.0 09/08/2023    POTASSIUM 4.0 10/08/2020     Lab Results   Component Value Date    CHLORIDE 103 09/08/2023    CHLORIDE 107 10/08/2020     Lab Results   Component Value Date    MARYSE 9.4 09/08/2023    MARYSE 8.9 10/08/2020     Lab Results   Component Value Date    CO2 23 09/08/2023    CO2 31 10/08/2020     Lab Results   Component Value Date    BUN 12.9 09/08/2023    BUN 8 10/08/2020     Lab Results   Component Value Date    CR 0.80 09/08/2023    CR 0.76 10/08/2020     Lab Results   Component Value Date    GLC 88 09/08/2023    GLC 91 10/08/2020     Lab Results   Component Value Date    TSH 1.83 10/24/2024    TSH 1.01 10/13/2022    TSH 1.27 10/08/2020     No results found for: \"T4\"  Lab Results   Component Value Date    A1C 5.4 10/24/2024    A1C 5.2 10/08/2020       No results found for: \"IGF1\"  No results found for: \"LH\"  No results found for: \"FSH\"  No results found for: \"ESTROGEN\"  No results found for: \"PROLACTIN\"        MRI Brain: I personally reviewed the original images and agree with the below reports.   Results for orders placed in visit on 11/02/23    MR Brain w/o & w Contrast    Narrative  MR BRAIN W/O & W CONTRAST 11/2/2023 9:30 AM    INDICATION: hyperprolactinemia; Elevated prolactin level  TECHNIQUE: Noncontrast and contrast enhanced MRI of the brain.  CONTRAST: 6 mL Gadavist  COMPARISON: None    FINDINGS:  There is no restricted diffusion. Mild mucosal thickening  sphenoid sinuses. Mastoid air cells appear free from significant  disease. Intraorbital contents are unremarkable. Ventricles are within  normal limits in size for the patient's age. Intracranial flow voids  are intact. There is no mass effect, midline shift, or extraaxial  collection. Signal intensity of the brain parenchyma is within normal  limits for the patient's age. No evidence for acute or chronic  intracranial blood products. Pituitary gland demonstrates normal size,  morphology and signal without " discrete lesion to indicate an adenoma.  No suprasellar abnormality. Optic apparatus, internal carotid artery  flow voids and cavernous sinuses have a normal appearance.    Impression  IMPRESSION:  No definite pituitary lesion is evident on MRI.              Assessment and Plan  44 year old female with     Elevated PRL  Unclear etiology but due to the timing fo blood work, this may be reflecting her acute stress event in summer 2023.     - repeat PRL  - TSH, free T4      Hot flush  Will check estradiol level      Return to clinic with me PRN based chauncey the results.     Addendum PRL 27., improved from 40 but we will follow up    45 minutes spent on the date of the encounter doing chart review, history and exam, personal review of imaging, outside record, documentation and further activities as noted above.    Note: Chart documentation done in part with Dragon Voice Recognition software. Although reviewed after completion, some word and grammatical errors may remain.  Please consider this when interpreting information in this chart     Carla Munguia MD  Staff Physician  Endocrinology and Metabolism  License: [id number]       Again, thank you for allowing me to participate in the care of your patient.        Sincerely,        Carla Munguia MD

## 2024-10-25 NOTE — PROGRESS NOTES
- Endocrinology Initial Consultation -    Reason for visit/consult:  Elevated prolactin level  [unfilled]     Primary care provider: Nicole Soto    HPI: 44 year old female is here for endocrinology clinic visit for Elevated prolactin level 40 in 10/2023   In spring 2023 she started to have excessie fatigue, not feelin well and low grade fever, found out ferritin, prolactin was elevated (PRL 40). Her WBC and Plt was low. She was hospitalized BMT floor for 54 hours.Her liver enzyme was high as well at that time. WBC was 1.7 in 8/2023.   Fall 2022, hot flush. She was on IUD. She was checked hormones at functional doctors; office. She was prescribed bio-identical progesterone. She was also non-hormone IUD placed. She was seen by OB, MRI was done read as no pituitary mass suspected lesion. Then in 2024 she was switched to progesterone IUD. She stopped progesterone oral capsule, but she still feels low libido, vaginal dryness, and not feeling great in general. She still has hot flush over 2 years but getting better.     LFTs during the hospitalization.   Lab Test 08/06/23  1341 08/06/23  0443   * 498*   * 764*   ALKPHOS 183* 192*   BILITOTAL 0.3 0.3          Prior to IUDs, she has been irregular cycle as a baseline.    History of pregnancy: Her son is currently age 13.      Latest Reference Range & Units 08/04/23 12:48   WBC 4.0 - 11.0 10e3/uL 1.7 (L)   Hemoglobin 11.7 - 15.7 g/dL 12.3   Hematocrit 35.0 - 47.0 % 37.4   Platelet Count 150 - 450 10e3/uL 130 (L)   RBC Count 3.80 - 5.20 10e6/uL 4.20   MCV 78 - 100 fL 89   MCH 26.5 - 33.0 pg 29.3   MCHC 31.5 - 36.5 g/dL 32.9   RDW 10.0 - 15.0 % 13.7   (L): Data is abnormally low    Paternal granmother and grandfather RA, paternal aunt: DM1, paternal aunt: thyroid.     Maternal grand father: lung cancer. he was a smoker.     Past Medical/Surgical History:  Past Medical History:    Diagnosis Date    Anxiety     Attention deficit disorder with hyperactivity(314.01)     Cervical high risk HPV (human papillomavirus) test positive     11/28/05, 6/14/06, 9/20/21    Depression     Depressive disorder Unknown    H/O colposcopy with cervical biopsy 06/07/2005    ECC- within normal limits. no dysplasia    H/O colposcopy with cervical biopsy 07/13/2006    biopsies and ECC- within normal limits. no dysplasia noted.    Infectious mononucleosis 01/01/2004    LSIL (low grade squamous intraepithelial lesion) on Pap smear 05/06/2005 9/20/21     Past Surgical History:   Procedure Laterality Date    ARTHROSCOPY KNEE RT/LT Left 09/08/2016    AS CLOSED TX NASAL BONE FRACTURE W/ STABILIZATION  03/01/2016    EXAM OF VAGINA,COLPOSCOPY  2006, 2005    EYE SURGERY  01/01/2008    Lasik eye surgery    ORTHOPEDIC SURGERY  9/8/16 and 12/12/16    knee/tibia       Allergies:  Allergies   Allergen Reactions    Seasonal Allergies        Current Medications   Current Outpatient Medications   Medication Sig Dispense Refill    albuterol (PROAIR HFA/PROVENTIL HFA/VENTOLIN HFA) 108 (90 Base) MCG/ACT inhaler Inhale 2 puffs into the lungs every 6 hours as needed for shortness of breath, wheezing or cough. 18 g 1    busPIRone (BUSPAR) 15 MG tablet Take 1 tablet (15 mg) by mouth 2 times daily. 180 tablet 1    cetirizine (ZYRTEC) 10 MG tablet Take 1 tablet (10 mg) by mouth daily      clindamycin (CLINDAMAX) 1 % external gel Apply topically 2 times daily as needed      levonorgestrel (KYLEENA) 19.5 MG IUD 1 each by Intrauterine route once. Inserted 5/8/24      mometasone-formoterol (DULERA) 200-5 MCG/ACT inhaler USE 2 INHALATIONS TWICE A DAY (NEED TO BE SEEN FOR FURTHER REFILLS) 13 g 0    montelukast (SINGULAIR) 10 MG tablet Take 1 tablet (10 mg) by mouth at bedtime. 90 tablet 2    ondansetron (ZOFRAN ODT) 8 MG ODT tab Take 1 tablet (8 mg) by mouth every 8 hours as needed for nausea 30 tablet 0    traZODone (DESYREL) 50 MG tablet  TAKE 1 TABLET AT BEDTIME AS NEEDED  Strength: 50 mg 90 tablet 3    tretinoin (RETIN-A) 0.1 % external cream Apply topically At Bedtime      venlafaxine (EFFEXOR XR) 150 MG 24 hr capsule Take 2 capsules (300 mg) by mouth daily. 180 capsule 1     No current facility-administered medications for this visit.       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       ROS:  Full review of systems taken with the help of the intake sheet. Otherwise a complete 14 point review of systems was taken and is negative unless stated in the history above.      Physical Exam:   Vitals: LMP 10/20/2024 (Approximate)   BMI= There is no height or weight on file to calculate BMI.   General: well appearing, no acute distress, pleasant and conversant,   Mental Status/neuro: alert and oriented  Face: symmetrical, normal facial color  Eyes: anicteric, no proptosis or lid lag  Resp: normally breathing      Labs : I reviewed data from epic and extract and summarize the pertinent data here.      Latest Reference Range & Units 10/16/23 14:24     Prolactin 5 - 23 ng/mL 40 (H)   (H): Data is abnormally high  Lab Results   Component Value Date     09/08/2023     10/08/2020      Lab Results   Component Value Date    POTASSIUM 4.0 09/08/2023    POTASSIUM 4.0 10/08/2020     Lab Results   Component Value Date    CHLORIDE 103 09/08/2023    CHLORIDE 107 10/08/2020     Lab Results   Component Value Date    MARYSE 9.4 09/08/2023    MARYSE 8.9 10/08/2020     Lab Results  "  Component Value Date    CO2 23 09/08/2023    CO2 31 10/08/2020     Lab Results   Component Value Date    BUN 12.9 09/08/2023    BUN 8 10/08/2020     Lab Results   Component Value Date    CR 0.80 09/08/2023    CR 0.76 10/08/2020     Lab Results   Component Value Date    GLC 88 09/08/2023    GLC 91 10/08/2020     Lab Results   Component Value Date    TSH 1.83 10/24/2024    TSH 1.01 10/13/2022    TSH 1.27 10/08/2020     No results found for: \"T4\"  Lab Results   Component Value Date    A1C 5.4 10/24/2024    A1C 5.2 10/08/2020       No results found for: \"IGF1\"  No results found for: \"LH\"  No results found for: \"FSH\"  No results found for: \"ESTROGEN\"  No results found for: \"PROLACTIN\"        MRI Brain: I personally reviewed the original images and agree with the below reports.   Results for orders placed in visit on 11/02/23    MR Brain w/o & w Contrast    Narrative  MR BRAIN W/O & W CONTRAST 11/2/2023 9:30 AM    INDICATION: hyperprolactinemia; Elevated prolactin level  TECHNIQUE: Noncontrast and contrast enhanced MRI of the brain.  CONTRAST: 6 mL Gadavist  COMPARISON: None    FINDINGS:  There is no restricted diffusion. Mild mucosal thickening  sphenoid sinuses. Mastoid air cells appear free from significant  disease. Intraorbital contents are unremarkable. Ventricles are within  normal limits in size for the patient's age. Intracranial flow voids  are intact. There is no mass effect, midline shift, or extraaxial  collection. Signal intensity of the brain parenchyma is within normal  limits for the patient's age. No evidence for acute or chronic  intracranial blood products. Pituitary gland demonstrates normal size,  morphology and signal without discrete lesion to indicate an adenoma.  No suprasellar abnormality. Optic apparatus, internal carotid artery  flow voids and cavernous sinuses have a normal appearance.    Impression  IMPRESSION:  No definite pituitary lesion is evident on MRI.              Assessment and " Plan  44 year old female with     Elevated PRL  Unclear etiology but due to the timing fo blood work, this may be reflecting her acute stress event in summer 2023.     - repeat PRL  - TSH, free T4      Hot flush  Will check estradiol level      Return to clinic with me PRN based chauncey the results.     Addendum PRL 27., improved from 40 but we will follow up    45 minutes spent on the date of the encounter doing chart review, history and exam, personal review of imaging, outside record, documentation and further activities as noted above.    Note: Chart documentation done in part with Dragon Voice Recognition software. Although reviewed after completion, some word and grammatical errors may remain.  Please consider this when interpreting information in this chart     Carla Munguia MD  Staff Physician  Endocrinology and Metabolism  License: [id number]

## 2024-10-26 LAB
ESTRADIOL SERPL-MCNC: 41 PG/ML
PROLACTIN SERPL 3RD IS-MCNC: 27 NG/ML (ref 5–23)

## 2024-10-30 LAB
BKR AP ASSOCIATED HPV REPORT: NORMAL
BKR LAB AP GYN ADEQUACY: NORMAL
BKR LAB AP GYN INTERPRETATION: NORMAL
BKR LAB AP LMP: NORMAL
BKR LAB AP PREVIOUS ABNL DX: NORMAL
BKR LAB AP PREVIOUS ABNORMAL: NORMAL
PATH REPORT.COMMENTS IMP SPEC: NORMAL
PATH REPORT.COMMENTS IMP SPEC: NORMAL
PATH REPORT.RELEVANT HX SPEC: NORMAL

## 2024-11-04 ENCOUNTER — MYC MEDICAL ADVICE (OUTPATIENT)
Dept: FAMILY MEDICINE | Facility: CLINIC | Age: 44
End: 2024-11-04
Payer: COMMERCIAL

## 2024-11-04 DIAGNOSIS — J45.30 MILD PERSISTENT ASTHMA WITHOUT COMPLICATION: Primary | ICD-10-CM

## 2024-11-04 NOTE — TELEPHONE ENCOUNTER
DME (Durable Medical Equipment) Orders and Documentation  Orders Placed This Encounter   Procedures    Other Respiratory Supplies        The patient was assessed and it was determined the patient is in need of the following listed DME Supplies/Equipment. Please complete supporting documentation below to demonstrate medical necessity.

## 2025-03-24 ENCOUNTER — OFFICE VISIT (OUTPATIENT)
Dept: FAMILY MEDICINE | Facility: CLINIC | Age: 45
End: 2025-03-24
Payer: COMMERCIAL

## 2025-03-24 VITALS
DIASTOLIC BLOOD PRESSURE: 68 MMHG | BODY MASS INDEX: 26.22 KG/M2 | HEIGHT: 63 IN | RESPIRATION RATE: 16 BRPM | WEIGHT: 148 LBS | TEMPERATURE: 98.4 F | HEART RATE: 89 BPM | SYSTOLIC BLOOD PRESSURE: 100 MMHG | OXYGEN SATURATION: 97 %

## 2025-03-24 DIAGNOSIS — R76.8 POSITIVE ANA (ANTINUCLEAR ANTIBODY): ICD-10-CM

## 2025-03-24 DIAGNOSIS — J45.30 MILD PERSISTENT ASTHMA WITHOUT COMPLICATION: ICD-10-CM

## 2025-03-24 DIAGNOSIS — F41.1 GENERALIZED ANXIETY DISORDER: Primary | ICD-10-CM

## 2025-03-24 DIAGNOSIS — F33.0 MILD EPISODE OF RECURRENT MAJOR DEPRESSIVE DISORDER: ICD-10-CM

## 2025-03-24 PROCEDURE — G2211 COMPLEX E/M VISIT ADD ON: HCPCS | Performed by: NURSE PRACTITIONER

## 2025-03-24 PROCEDURE — 3078F DIAST BP <80 MM HG: CPT | Performed by: NURSE PRACTITIONER

## 2025-03-24 PROCEDURE — 3074F SYST BP LT 130 MM HG: CPT | Performed by: NURSE PRACTITIONER

## 2025-03-24 PROCEDURE — 99214 OFFICE O/P EST MOD 30 MIN: CPT | Performed by: NURSE PRACTITIONER

## 2025-03-24 PROCEDURE — 1126F AMNT PAIN NOTED NONE PRSNT: CPT | Performed by: NURSE PRACTITIONER

## 2025-03-24 RX ORDER — BUSPIRONE HYDROCHLORIDE 15 MG/1
15 TABLET ORAL 2 TIMES DAILY
Qty: 180 TABLET | Refills: 1 | Status: SHIPPED | OUTPATIENT
Start: 2025-03-24

## 2025-03-24 RX ORDER — VENLAFAXINE HYDROCHLORIDE 150 MG/1
300 CAPSULE, EXTENDED RELEASE ORAL DAILY
Qty: 180 CAPSULE | Refills: 1 | Status: SHIPPED | OUTPATIENT
Start: 2025-03-24

## 2025-03-24 RX ORDER — BUDESONIDE AND FORMOTEROL FUMARATE DIHYDRATE 160; 4.5 UG/1; UG/1
AEROSOL RESPIRATORY (INHALATION)
Qty: 20.4 G | Refills: 11 | Status: SHIPPED | OUTPATIENT
Start: 2025-03-24

## 2025-03-24 ASSESSMENT — ASTHMA QUESTIONNAIRES: ACT_TOTALSCORE: 19

## 2025-03-24 ASSESSMENT — ANXIETY QUESTIONNAIRES
6. BECOMING EASILY ANNOYED OR IRRITABLE: SEVERAL DAYS
2. NOT BEING ABLE TO STOP OR CONTROL WORRYING: SEVERAL DAYS
5. BEING SO RESTLESS THAT IT IS HARD TO SIT STILL: SEVERAL DAYS
GAD7 TOTAL SCORE: 5
GAD7 TOTAL SCORE: 5
7. FEELING AFRAID AS IF SOMETHING AWFUL MIGHT HAPPEN: NOT AT ALL
GAD7 TOTAL SCORE: 5
7. FEELING AFRAID AS IF SOMETHING AWFUL MIGHT HAPPEN: NOT AT ALL
1. FEELING NERVOUS, ANXIOUS, OR ON EDGE: SEVERAL DAYS
8. IF YOU CHECKED OFF ANY PROBLEMS, HOW DIFFICULT HAVE THESE MADE IT FOR YOU TO DO YOUR WORK, TAKE CARE OF THINGS AT HOME, OR GET ALONG WITH OTHER PEOPLE?: NOT DIFFICULT AT ALL
IF YOU CHECKED OFF ANY PROBLEMS ON THIS QUESTIONNAIRE, HOW DIFFICULT HAVE THESE PROBLEMS MADE IT FOR YOU TO DO YOUR WORK, TAKE CARE OF THINGS AT HOME, OR GET ALONG WITH OTHER PEOPLE: NOT DIFFICULT AT ALL
3. WORRYING TOO MUCH ABOUT DIFFERENT THINGS: SEVERAL DAYS
4. TROUBLE RELAXING: NOT AT ALL

## 2025-03-24 ASSESSMENT — PAIN SCALES - GENERAL: PAINLEVEL_OUTOF10: NO PAIN (0)

## 2025-03-24 NOTE — PROGRESS NOTES
Assessment & Plan     Assessment & Plan    Generalized anxiety disorder:/ Mild Major Depression:  Mood symptoms addressed. Symptoms controlled/stable.  Continue plan.  Healthy self cares.  Will refill as necessary in between visits.  Routine follow-up is every 6 months.  Counseling, as needed.  Patient advised to follow-up if worsening symptoms.      Positive NISA (antinuclear antibody):  - Discuss with rheumatologist next month. Consider discussing low dose naltrexone for pain management.    Mild persistent asthma without complication:  - Asthma control is generally good, but experiences symptoms with exertion and occasionally at night.  - Start taking singulair as allergies are starting. Consider switching to Symbicort for smart therapy if covered by insurance.    The longitudinal plan of care for the diagnosis(es)/condition(s) as documented were addressed during this visit. Due to the added complexity in care, I will continue to support the patient in the subsequent management and with ongoing continuity of care.    AI was used for the creation of the note.            MEDICATIONS:        - Continue other medications without change    Lewis Jimenez is a 44 year old, presenting for the following health issues:   Follow Up        3/24/2025    10:45 AM   Additional Questions   Roomed by BT   Accompanied by self     History of Present Illness       Mental Health Follow-up:  Patient presents to follow-up on Depression & Anxiety.Patient's depression since last visit has been:  Good  The patient is not having other symptoms associated with depression.  Patient's anxiety since last visit has been:  Good  The patient is not having other symptoms associated with anxiety.  Any significant life events: other  Patient is not feeling anxious or having panic attacks.  Patient has no concerns about alcohol or drug use.    Reason for visit:  Follow up    She eats 0-1 servings of fruits and vegetables daily.She consumes  "2 sweetened beverage(s) daily.She exercises with enough effort to increase her heart rate 9 or less minutes per day.  She exercises with enough effort to increase her heart rate 3 or less days per week.   She is taking medications regularly.      History of Present Illness-  - Barbara Yates, 44-year-old female.  - Reports stress due to managing multiple responsibilities, including work and caregiving.  - Experiences mental fatigue and physical tiredness, exacerbated by stress.  - Mentions weight gain and associated knee and hip pain.  - Reports asthma symptoms, including shortness of breath with exertion and nighttime symptoms at least once a week.  - Has a history of rheumatoid issues contributing to physical discomfort.  - Son has Dravet syndrome and experienced a seizure lasting over 5 minutes a week before Rivas's Day, requiring emergency services.  - Son's medication management involves a pediatric neurologist; previously taken off medication a year ago.  - Son's condition and care contribute to stress levels.    Social History:  - The patient is experiencing social stressors related to managing work, caregiving responsibilities, and relationship dynamics. She reports feeling overwhelmed and mentally drained by the end of the week.            Review of Systems  Constitutional, neuro, ENT, endocrine, pulmonary, cardiac, gastrointestinal, genitourinary, musculoskeletal, integument and psychiatric systems are negative, except as otherwise noted.      Objective    /68   Pulse 89   Temp 98.4  F (36.9  C) (Temporal)   Resp 16   Ht 1.6 m (5' 2.99\")   Wt 67.1 kg (148 lb)   LMP 03/10/2025   SpO2 97%   BMI 26.23 kg/m    Body mass index is 26.23 kg/m .  Physical Exam   GENERAL healthy, alert and no distress  EYES sclera clear, PERRLA  HENT: nose,mouth without ulcers or lesions, Normal ear canals, TM's pearly grey, normal light reflex bilaterally   NECK: no adenopathy, no asymmetry, masses, or scars " and thyroid normal to palpation  RESP: Clear to auscultation  CV: RRR, no murmur, no edema  NEURO: NEGATIVE, alert/oriented to person, location and time  PSYCH: normal pleasant affect, normal grooming, stressed.             Signed Electronically by: MANJULA Guerrero CNP

## 2025-03-24 NOTE — PATIENT INSTRUCTIONS
Based on our discussion, I have outlined the following instructions for you:    - Continue taking Buspar two times a day for anxiety.  - Plan to talk with your rheumatologist next month about your positive NISA test. You might want to ask about low dose naltrexone for managing pain.  - Begin taking Singulair as your allergies are starting.  - Think about switching to Symbicort for asthma if your insurance covers it.  - Keep using Dulera two times a day for asthma.    Thank you again for your visit, and we look forward to supporting you in your journey to better health.        General Instructions:  If you have been seen for a concern and are worse or not improving, please schedule a follow-up or reach out to a member of our care team or nurses if it is urgent.   Nurse advice is available 24/7 by calling [phone].  For emergencies, please call [phone].    You may see your results before we can make recommendations. This is common, as sometimes we are awaiting labs to return or we are out of office on a particular day. Please be patient, and if you don't see a response from me or one of my colleagues within 2-3 business days, and you have a specific concern, please send us a message.     If you have run out of refills, please schedule follow-up visits. This is generally an indication for when you are due for a follow-up visit. Most mental health or chronic issues we are treating require some type of visit every 6 months. We are now offering many issues to be done through telephone or video visits! However, if exams are needed or it is a complex concern, we may ask you to be seen in person.    Physicals/Preventative exam slots fill up fast. Please consider scheduling these 2-3 months in advance to allow for the appointment time that fits you best. If you have medications ordered or other issues addressed at these visits, please be aware there are extra costs associated with this.      Sincerely,   Nicole Soto, DNP    [facility]

## 2025-04-11 ENCOUNTER — OFFICE VISIT (OUTPATIENT)
Dept: ENDOCRINOLOGY | Facility: CLINIC | Age: 45
End: 2025-04-11
Payer: COMMERCIAL

## 2025-04-11 VITALS
RESPIRATION RATE: 16 BRPM | SYSTOLIC BLOOD PRESSURE: 113 MMHG | DIASTOLIC BLOOD PRESSURE: 75 MMHG | OXYGEN SATURATION: 93 % | HEART RATE: 88 BPM | WEIGHT: 147 LBS | BODY MASS INDEX: 26.05 KG/M2

## 2025-04-11 DIAGNOSIS — D35.2 PROLACTINOMA (H): Primary | ICD-10-CM

## 2025-04-11 LAB — PROLACTIN SERPL 3RD IS-MCNC: 24 NG/ML (ref 5–23)

## 2025-04-11 PROCEDURE — 99214 OFFICE O/P EST MOD 30 MIN: CPT | Performed by: INTERNAL MEDICINE

## 2025-04-11 PROCEDURE — 3078F DIAST BP <80 MM HG: CPT | Performed by: INTERNAL MEDICINE

## 2025-04-11 PROCEDURE — 84146 ASSAY OF PROLACTIN: CPT | Performed by: INTERNAL MEDICINE

## 2025-04-11 PROCEDURE — 3074F SYST BP LT 130 MM HG: CPT | Performed by: INTERNAL MEDICINE

## 2025-04-11 PROCEDURE — 36415 COLL VENOUS BLD VENIPUNCTURE: CPT | Performed by: INTERNAL MEDICINE

## 2025-04-11 RX ORDER — CABERGOLINE 0.5 MG/1
0.25 TABLET ORAL WEEKLY
Qty: 6 TABLET | Refills: 3 | Status: SHIPPED | OUTPATIENT
Start: 2025-04-11

## 2025-04-11 NOTE — PATIENT INSTRUCTIONS
Welcome to the [facility] Endocrinology and Diabetes Clinics     Our Endocrinology Clinics are here to provide you with a team-based, collaborative approach in the diagnosis and treatment of patients with diabetes and endocrine disorders. The team is made up of Physicians, Physician Assistants, Certified Diabetes Educators, Registered Nurses, Medical Assistants, Emergency Medical Technicians, and many others, all of whom have the unified goal of providing our patients with high quality care.     Please see below for some helpful tips to best navigate and use the [facility] Endocrinology clinic:     [place] Respect: At [facility], we are committed to a respectful and safe space for all patients, visitors, and staff.  We believe that mutual respect between patients and their care team is the foundation of quality care.  It is our expectation that you will be treated with respect by your care team.  In turn, we ask that all communication with the care team (written and verbal) be respectful and free from profanity, threatening, or abusive language.  Disrespectful communication undermines our therapeutic relationship with you and may result in us being unable to continue to provide your care.    Refills: A provider must see you at least annually to prescribe and refill medications. This is to ensure your safety as well as meet insurance and compliance regulations.    Scheduling: Many of our Providers offer both in-person or video visits. Please call to schedule any needed follow ups as soon as possible because our provider schedules fill up very quickly. Our care team has the right to require an in-person visit when they believe that it is medically necessary. Please remember that for any virtual visits, you must be in the [facility] at the time of the visit, otherwise we are unable to see you and you will need to be rescheduled.    Missed Appointments: If you need to cancel or miss your  scheduled appointment, please call the clinic at [phone] to reschedule.  Please note if you repeatedly miss appointments or repeatedly miss appointments without calling to inform us ahead of time (no-show), the clinic may elect to not allow you to reschedule without speaking to a manager, may require a Partnership In Care Agreement prior to rescheduling, or could result in you no longer being able to receive care from the clinic. Providing the clinic with timely notification if you have to miss an appointment, allows us to better serve the needs of all of our patients.    Primary Care Provider: Our Endocrinologists are Specialists in their field. We expect you to have a Primary Care Provider established to handle any needs outside of your diabetes and endocrine care.  We would be happy to assist you find a Primary Care Provider, if you do not have one.    lifeIO: lifeIO is a wonderful resource that allows you access to your Care Team via online or the abel. Please ask a member of the team if you would like help creating an account. Please note that it may take up to 2 business days for a response. lifeIO messages are not reviewed on weekends or after business hours.  Emergent or urgent care needs should never be communicated via lifeIO.  If you experience a medical emergency call 911 or go to the nearest emergency room.    Labs: It is recommended that you stay within the [facility] for labs but you are welcome to obtain ordered labs (with some exceptions) from any location of your choice as long as they are able to complete and process the needed labs. If you need us to fax orders to your preferred lab, please provide us the name and fax number of the lab you would like to go to so we can fax the orders. If your labs are drawn outside of the [facility], please have them fax the results to [phone] ([place]) or [phone] ([place]) or via [employer]. It is your  responsibility to ensure that outside lab results are sent to us.    We look forward to working with you. Please do not hesitate to reach out with any questions.    Thank you,    The Endocrine Team    [facility] Address:   [place] Address:     [place] [address]    Phone: [phone]  Fax: [phone]   [address] [place] [address]    Phone: [phone]  Fax: [phone]     [place] Cost Estimate Phone Number: [phone]    General Lab and Imaging Scheduling Phone Number: [phone]      If you are interested in exploring research opportunities in the Division of Diabetes, Endocrinology and Metabolism and within the [facility] you are welcome to view the following QR codes by scanning them using your phone's camera to access a link to more information.  Participating in a research study is voluntary. Your decision whether or not to participate will not affect your current or future relations with the [facility] or [facility]. Current available studies are:     Type 1 Diabetes  Adults     Pediatrics     Type 2 Diabetes  Weight Loss - People Treated with Diet or Metformin Only     Pediatrics and Young Adults

## 2025-04-11 NOTE — PROGRESS NOTES
- Endocrinology follow up -    Reason for visit/consult:  Elevated prolactin level     Primary care provider: Nicole Soto    Assessment and Plan  44 year old female with NELL and depression who presents for follow up for elevated prolactin    Elevated PRL  Unclear etiology, considered microadenoma not detected on MRI. Still with symptoms and prolactin remains elevated.    - repeat PRL  - Start Cabergoline 0.25mg once weekly      Follow up in 6 months with labs    Patient was seen and discussed with staff attending, Dr. Munguia, who agrees with the assessment and plan as above     Marilee Newell  Internal Medicine Resident, PGY3    Attending tie-in note  I saw the patient with Dr. Newell PGY3 and directly examined patient and discussed. Agree above note and plan.       Carla Munguia MD  Staff Physician  Endocrinology and Metabolism  [facility]  License: [id number]  Pager: [phone]   -------------------------------------------------------------------------------------------------------------------  Interval History as of 4/11/2025 :  Patient still with symptoms of hot flashes, tachycardia, fatigue, and weight gain. Seen by rheumatology for liver injury and neutropenia, but no new recommendations.    HPI: 44 year old female is here for endocrinology clinic visit for Elevated prolactin level. In spring 2023 she started to have excessie fatigue, not feelin well and low grade fever. Hospitalized 8/2023 with liver injury (700's) and neutropenic fever of unclear etiology (?viral). Her WBC and Plt was low.  Follow up visit 10/2023 she was found to have elevated prolactin 40. Thyroid and estradiol normal.  Brain MRI largely unremarkable with no pituitary mass suspected. Initially attributed to liver injury and stress, however, follow up prolactin one year later 10/2024 still elevated at 27. Not taking any anti-psychotics.  Fall 2022, hot flush. She was on  IUD. She was checked hormones at functional doctors; office. She was prescribed bio-identical progesterone. She was also non-hormone IUD placed. She was seen by OB. Then in 2024 she was switched to progesterone IUD. She stopped progesterone oral capsule, but she still feels low libido, vaginal dryness, and not feeling great in general.      LFTs during the hospitalization.   Lab Test 08/06/23  1341 08/06/23  0443   * 498*   * 764*   ALKPHOS 183* 192*   BILITOTAL 0.3 0.3          Prior to IUDs, she has been irregular cycle as a baseline.    History of pregnancy: Her son is currently age 13.      Latest Reference Range & Units 08/04/23 12:48   WBC 4.0 - 11.0 10e3/uL 1.7 (L)   Hemoglobin 11.7 - 15.7 g/dL 12.3   Hematocrit 35.0 - 47.0 % 37.4   Platelet Count 150 - 450 10e3/uL 130 (L)   RBC Count 3.80 - 5.20 10e6/uL 4.20   MCV 78 - 100 fL 89   MCH 26.5 - 33.0 pg 29.3   MCHC 31.5 - 36.5 g/dL 32.9   RDW 10.0 - 15.0 % 13.7   (L): Data is abnormally low    Paternal granmother and grandfather RA, paternal aunt: DM1, paternal aunt: thyroid.     Maternal grand father: lung cancer. he was a smoker.     Past Medical/Surgical History:  Past Medical History:   Diagnosis Date    Anxiety     Attention deficit disorder with hyperactivity(314.01)     Cervical high risk HPV (human papillomavirus) test positive     11/28/05, 6/14/06, 9/20/21    Depression     Depressive disorder Unknown    H/O colposcopy with cervical biopsy 06/07/2005    ECC- within normal limits. no dysplasia    H/O colposcopy with cervical biopsy 07/13/2006    biopsies and ECC- within normal limits. no dysplasia noted.    Infectious mononucleosis 01/01/2004    LSIL (low grade squamous intraepithelial lesion) on Pap smear 05/06/2005 9/20/21     Past Surgical History:   Procedure Laterality Date    ARTHROSCOPY KNEE RT/LT Left 09/08/2016    AS CLOSED TX NASAL BONE FRACTURE W/ STABILIZATION  03/01/2016    EXAM OF VAGINA,COLPOSCOPY  2006, 2005    EYE  SURGERY  01/01/2008    Lasik eye surgery    ORTHOPEDIC SURGERY  9/8/16 and 12/12/16    knee/tibia       Allergies:  Allergies   Allergen Reactions    Seasonal Allergies        Current Medications   Current Outpatient Medications   Medication Sig Dispense Refill    albuterol (PROAIR HFA/PROVENTIL HFA/VENTOLIN HFA) 108 (90 Base) MCG/ACT inhaler Inhale 2 puffs into the lungs every 6 hours as needed for shortness of breath, wheezing or cough. 18 g 1    budesonide-formoterol (SYMBICORT/BREYNA) 160-4.5 MCG/ACT Inhaler Inhale 1 puff twice daily plus 1 puff as needed. May use up to 12 puffs per day. 20.4 g 11    busPIRone (BUSPAR) 15 MG tablet Take 1 tablet (15 mg) by mouth 2 times daily. 180 tablet 1    cetirizine (ZYRTEC) 10 MG tablet Take 1 tablet (10 mg) by mouth daily      clindamycin (CLINDAMAX) 1 % external gel Apply topically 2 times daily as needed      levonorgestrel (KYLEENA) 19.5 MG IUD 1 each by Intrauterine route once. Inserted 5/8/24      montelukast (SINGULAIR) 10 MG tablet Take 1 tablet (10 mg) by mouth at bedtime. 90 tablet 2    ondansetron (ZOFRAN ODT) 8 MG ODT tab Take 1 tablet (8 mg) by mouth every 8 hours as needed for nausea 30 tablet 0    traZODone (DESYREL) 50 MG tablet TAKE 1 TABLET AT BEDTIME AS NEEDED  Strength: 50 mg 90 tablet 3    tretinoin (RETIN-A) 0.1 % external cream Apply topically At Bedtime      venlafaxine (EFFEXOR XR) 150 MG 24 hr capsule Take 2 capsules (300 mg) by mouth daily. 180 capsule 1     No current facility-administered medications for this visit.       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       ROS:  Full review of systems taken with the help of the intake sheet. Otherwise a complete 14 point review of systems was taken and is negative unless stated in the history above.      Physical Exam:   Vitals: /75   Pulse 88   Resp 16   Wt 66.7 kg (147 lb)   LMP 03/10/2025   SpO2 93%   BMI 26.05 kg/m    BMI= Body mass index is 26.05 kg/m .   General: well appearing, no acute distress, pleasant and conversant,   Mental Status/neuro: alert and oriented  Face: symmetrical, normal facial color  Eyes: anicteric, no proptosis or lid lag  Resp: breathing comfortably on room air      Labs : I reviewed data from epic and extract and summarize the pertinent data here.      Latest Reference Range & Units 10/16/23 14:24     Prolactin 5 - 23 ng/mL 40 (H)   (H): Data is abnormally high        MRI Brain: I personally reviewed the original images and agree with the below reports.   Results for orders placed in visit on 11/02/23    MR Brain w/o & w Contrast    Narrative  MR BRAIN W/O & W CONTRAST 11/2/2023 9:30 AM    INDICATION: hyperprolactinemia; Elevated prolactin level  TECHNIQUE: Noncontrast and contrast enhanced MRI of the brain.  CONTRAST: 6 mL Gadavist  COMPARISON: None    FINDINGS:  There is no restricted diffusion. Mild mucosal thickening  sphenoid sinuses. Mastoid air cells appear free from significant  disease. Intraorbital contents are unremarkable. Ventricles are within  normal limits in size for the patient's age. Intracranial flow voids  are intact. There is no mass effect, midline shift, or extraaxial  collection. Signal intensity of the brain parenchyma is within normal  limits for the patient's age. No evidence for acute or chronic  intracranial blood products. Pituitary gland demonstrates normal size,  morphology and signal without discrete lesion to indicate an adenoma.  No suprasellar  abnormality. Optic apparatus, internal carotid artery  flow voids and cavernous sinuses have a normal appearance.    Impression  IMPRESSION:  No definite pituitary lesion is evident on MRI.

## 2025-04-11 NOTE — NURSING NOTE
Barbara Yates's goals for this visit include:   Chief Complaint   Patient presents with    Follow Up     pituitary       She requests these members of her care team be copied on today's visit information: yes    PCP: Nicole Soto    Referring Provider:  Referred Self, MD  No address on file    /75   Pulse 88   Resp 16   Wt 66.7 kg (147 lb)   LMP 03/10/2025   SpO2 93%   BMI 26.05 kg/m      Do you need any medication refills at today's visit? None    Maco Padilla, EMT

## 2025-04-11 NOTE — LETTER
4/11/2025      Barbara Yates  [address]  [facility] [address]      Dear Colleague,    Thank you for referring your patient, Barbara Yates, to the [facility]. Please see a copy of my visit note below.                                                   - Endocrinology follow up -    Reason for visit/consult:  Elevated prolactin level     Primary care provider: Nicole Soto    Assessment and Plan  44 year old female with NELL and depression who presents for follow up for elevated prolactin    Elevated PRL  Unclear etiology, considered microadenoma not detected on MRI. Still with symptoms and prolactin remains elevated.    - repeat PRL  - Start Cabergoline 0.25mg once weekly      Follow up in 6 months with labs    Patient was seen and discussed with staff attending, Dr. Munguia, who agrees with the assessment and plan as above     Marilee Newell  Internal Medicine Resident, PGY3    Attending tie-in note  I saw the patient with Dr. Newell PGY3 and directly examined patient and discussed. Agree above note and plan.       Carla Munguia MD  Staff Physician  Endocrinology and Metabolism  [facility]  License: [id number]  Pager: [phone]   -------------------------------------------------------------------------------------------------------------------  Interval History as of 4/11/2025 :  Patient still with symptoms of hot flashes, tachycardia, fatigue, and weight gain. Seen by rheumatology for liver injury and neutropenia, but no new recommendations.    HPI: 44 year old female is here for endocrinology clinic visit for Elevated prolactin level. In spring 2023 she started to have excessie fatigue, not feelin well and low grade fever. Hospitalized 8/2023 with liver injury (700's) and neutropenic fever of unclear etiology (?viral). Her WBC and Plt was low.  Follow up visit 10/2023 she was found to have elevated prolactin 40. Thyroid and estradiol normal.  Brain MRI  largely unremarkable with no pituitary mass suspected. Initially attributed to liver injury and stress, however, follow up prolactin one year later 10/2024 still elevated at 27. Not taking any anti-psychotics.  Fall 2022, hot flush. She was on IUD. She was checked hormones at functional doctors; office. She was prescribed bio-identical progesterone. She was also non-hormone IUD placed. She was seen by OB. Then in 2024 she was switched to progesterone IUD. She stopped progesterone oral capsule, but she still feels low libido, vaginal dryness, and not feeling great in general.      LFTs during the hospitalization.   Lab Test 08/06/23  1341 08/06/23  0443   * 498*   * 764*   ALKPHOS 183* 192*   BILITOTAL 0.3 0.3          Prior to IUDs, she has been irregular cycle as a baseline.    History of pregnancy: Her son is currently age 13.      Latest Reference Range & Units 08/04/23 12:48   WBC 4.0 - 11.0 10e3/uL 1.7 (L)   Hemoglobin 11.7 - 15.7 g/dL 12.3   Hematocrit 35.0 - 47.0 % 37.4   Platelet Count 150 - 450 10e3/uL 130 (L)   RBC Count 3.80 - 5.20 10e6/uL 4.20   MCV 78 - 100 fL 89   MCH 26.5 - 33.0 pg 29.3   MCHC 31.5 - 36.5 g/dL 32.9   RDW 10.0 - 15.0 % 13.7   (L): Data is abnormally low    Paternal granmother and grandfather RA, paternal aunt: DM1, paternal aunt: thyroid.     Maternal grand father: lung cancer. he was a smoker.     Past Medical/Surgical History:  Past Medical History:   Diagnosis Date     Anxiety      Attention deficit disorder with hyperactivity(314.01)      Cervical high risk HPV (human papillomavirus) test positive     11/28/05, 6/14/06, 9/20/21     Depression      Depressive disorder Unknown     H/O colposcopy with cervical biopsy 06/07/2005    ECC- within normal limits. no dysplasia     H/O colposcopy with cervical biopsy 07/13/2006    biopsies and ECC- within normal limits. no dysplasia noted.     Infectious mononucleosis 01/01/2004     LSIL (low grade squamous intraepithelial  lesion) on Pap smear 05/06/2005 9/20/21     Past Surgical History:   Procedure Laterality Date     ARTHROSCOPY KNEE RT/LT Left 09/08/2016     AS CLOSED TX NASAL BONE FRACTURE W/ STABILIZATION  03/01/2016     EXAM OF VAGINA,COLPOSCOPY  2006, 2005     EYE SURGERY  01/01/2008    Lasik eye surgery     ORTHOPEDIC SURGERY  9/8/16 and 12/12/16    knee/tibia       Allergies:  Allergies   Allergen Reactions     Seasonal Allergies        Current Medications   Current Outpatient Medications   Medication Sig Dispense Refill     albuterol (PROAIR HFA/PROVENTIL HFA/VENTOLIN HFA) 108 (90 Base) MCG/ACT inhaler Inhale 2 puffs into the lungs every 6 hours as needed for shortness of breath, wheezing or cough. 18 g 1     budesonide-formoterol (SYMBICORT/BREYNA) 160-4.5 MCG/ACT Inhaler Inhale 1 puff twice daily plus 1 puff as needed. May use up to 12 puffs per day. 20.4 g 11     busPIRone (BUSPAR) 15 MG tablet Take 1 tablet (15 mg) by mouth 2 times daily. 180 tablet 1     cetirizine (ZYRTEC) 10 MG tablet Take 1 tablet (10 mg) by mouth daily       clindamycin (CLINDAMAX) 1 % external gel Apply topically 2 times daily as needed       levonorgestrel (KYLEENA) 19.5 MG IUD 1 each by Intrauterine route once. Inserted 5/8/24       montelukast (SINGULAIR) 10 MG tablet Take 1 tablet (10 mg) by mouth at bedtime. 90 tablet 2     ondansetron (ZOFRAN ODT) 8 MG ODT tab Take 1 tablet (8 mg) by mouth every 8 hours as needed for nausea 30 tablet 0     traZODone (DESYREL) 50 MG tablet TAKE 1 TABLET AT BEDTIME AS NEEDED  Strength: 50 mg 90 tablet 3     tretinoin (RETIN-A) 0.1 % external cream Apply topically At Bedtime       venlafaxine (EFFEXOR XR) 150 MG 24 hr capsule Take 2 capsules (300 mg) by mouth daily. 180 capsule 1     No current facility-administered medications for this visit.       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       ROS:  Full review of systems taken with the help of the intake sheet. Otherwise a complete 14 point review of systems was taken and is negative unless stated in the history above.      Physical Exam:   Vitals: /75   Pulse 88   Resp 16   Wt 66.7 kg (147 lb)   LMP 03/10/2025   SpO2 93%   BMI 26.05 kg/m    BMI= Body mass index is 26.05 kg/m .   General: well appearing, no acute distress, pleasant and conversant,   Mental Status/neuro: alert and oriented  Face: symmetrical, normal facial color  Eyes: anicteric, no proptosis or lid lag  Resp: breathing comfortably on room air      Labs : I reviewed data from epic and extract and summarize the pertinent data here.      Latest Reference Range & Units 10/16/23 14:24     Prolactin 5 - 23 ng/mL 40 (H)   (H): Data is abnormally high        MRI Brain: I personally reviewed the original images and agree with the below reports.   Results for orders placed in visit on 11/02/23    MR Brain w/o & w Contrast    Narrative  MR BRAIN W/O & W CONTRAST 11/2/2023 9:30 AM    INDICATION: hyperprolactinemia; Elevated prolactin level  TECHNIQUE: Noncontrast and contrast enhanced MRI of the brain.  CONTRAST: 6 mL Gadavist  COMPARISON: None    FINDINGS:  There is no restricted diffusion. Mild mucosal thickening  sphenoid sinuses. Mastoid air cells appear free from significant  disease. Intraorbital contents are unremarkable. Ventricles are within  normal limits in size for the patient's age. Intracranial flow voids  are intact. There is no mass effect, midline shift, or extraaxial  collection.  Signal intensity of the brain parenchyma is within normal  limits for the patient's age. No evidence for acute or chronic  intracranial blood products. Pituitary gland demonstrates normal size,  morphology and signal without discrete lesion to indicate an adenoma.  No suprasellar abnormality. Optic apparatus, internal carotid artery  flow voids and cavernous sinuses have a normal appearance.    Impression  IMPRESSION:  No definite pituitary lesion is evident on MRI.            Again, thank you for allowing me to participate in the care of your patient.        Sincerely,        Carla Munguia MD    Electronically signed

## 2025-06-04 DIAGNOSIS — J45.30 MILD PERSISTENT ASTHMA WITHOUT COMPLICATION: ICD-10-CM

## 2025-06-04 RX ORDER — BUDESONIDE AND FORMOTEROL FUMARATE DIHYDRATE 160; 4.5 UG/1; UG/1
AEROSOL RESPIRATORY (INHALATION)
Qty: 60 G | Refills: 1 | Status: SHIPPED | OUTPATIENT
Start: 2025-06-04

## 2025-07-16 ENCOUNTER — TRANSCRIBE ORDERS (OUTPATIENT)
Dept: OTHER | Age: 45
End: 2025-07-16

## 2025-07-16 DIAGNOSIS — R10.2 PELVIC AND PERINEAL PAIN: Primary | ICD-10-CM

## 2025-07-23 ENCOUNTER — TRANSFERRED RECORDS (OUTPATIENT)
Dept: HEALTH INFORMATION MANAGEMENT | Facility: CLINIC | Age: 45
End: 2025-07-23
Payer: COMMERCIAL

## 2025-09-04 ENCOUNTER — THERAPY VISIT (OUTPATIENT)
Dept: PHYSICAL THERAPY | Facility: CLINIC | Age: 45
End: 2025-09-04
Payer: COMMERCIAL

## 2025-09-04 DIAGNOSIS — N39.3 FEMALE STRESS INCONTINENCE: Primary | ICD-10-CM

## 2025-09-04 DIAGNOSIS — R10.2 PELVIC AND PERINEAL PAIN: ICD-10-CM
